# Patient Record
Sex: MALE | Race: WHITE | NOT HISPANIC OR LATINO | Employment: OTHER | URBAN - METROPOLITAN AREA
[De-identification: names, ages, dates, MRNs, and addresses within clinical notes are randomized per-mention and may not be internally consistent; named-entity substitution may affect disease eponyms.]

---

## 2018-03-15 ENCOUNTER — HOSPITAL ENCOUNTER (EMERGENCY)
Facility: HOSPITAL | Age: 78
Discharge: HOME | End: 2018-03-15
Attending: EMERGENCY MEDICINE | Admitting: EMERGENCY MEDICINE

## 2018-03-15 ENCOUNTER — APPOINTMENT (OUTPATIENT)
Dept: GENERAL RADIOLOGY | Facility: HOSPITAL | Age: 78
End: 2018-03-15

## 2018-03-15 VITALS
WEIGHT: 181 LBS | HEIGHT: 69 IN | BODY MASS INDEX: 26.81 KG/M2 | DIASTOLIC BLOOD PRESSURE: 82 MMHG | HEART RATE: 126 BPM | SYSTOLIC BLOOD PRESSURE: 108 MMHG | RESPIRATION RATE: 18 BRPM | OXYGEN SATURATION: 95 % | TEMPERATURE: 97.5 F

## 2018-03-15 DIAGNOSIS — I48.0 PAROXYSMAL A-FIB (HCC): Primary | ICD-10-CM

## 2018-03-15 DIAGNOSIS — R79.1 SUBTHERAPEUTIC INTERNATIONAL NORMALIZED RATIO (INR): ICD-10-CM

## 2018-03-15 LAB
ALBUMIN SERPL-MCNC: 4.4 G/DL (ref 3.2–4.8)
ALBUMIN/GLOB SERPL: 1.5 G/DL (ref 1.5–2.5)
ALP SERPL-CCNC: 66 U/L (ref 25–100)
ALT SERPL W P-5'-P-CCNC: 17 U/L (ref 7–40)
ANION GAP SERPL CALCULATED.3IONS-SCNC: 5 MMOL/L (ref 3–11)
AST SERPL-CCNC: 35 U/L (ref 0–33)
BASOPHILS # BLD AUTO: 0.03 10*3/MM3 (ref 0–0.2)
BASOPHILS NFR BLD AUTO: 0.4 % (ref 0–1)
BILIRUB SERPL-MCNC: 0.5 MG/DL (ref 0.3–1.2)
BUN BLD-MCNC: 10 MG/DL (ref 9–23)
BUN/CREAT SERPL: 11.1 (ref 7–25)
CALCIUM SPEC-SCNC: 9.4 MG/DL (ref 8.7–10.4)
CHLORIDE SERPL-SCNC: 108 MMOL/L (ref 99–109)
CO2 SERPL-SCNC: 26 MMOL/L (ref 20–31)
CREAT BLD-MCNC: 0.9 MG/DL (ref 0.6–1.3)
DEPRECATED RDW RBC AUTO: 43.1 FL (ref 37–54)
EOSINOPHIL # BLD AUTO: 0.05 10*3/MM3 (ref 0–0.3)
EOSINOPHIL NFR BLD AUTO: 0.6 % (ref 0–3)
ERYTHROCYTE [DISTWIDTH] IN BLOOD BY AUTOMATED COUNT: 13.4 % (ref 11.3–14.5)
GFR SERPL CREATININE-BSD FRML MDRD: 82 ML/MIN/1.73
GLOBULIN UR ELPH-MCNC: 3 GM/DL
GLUCOSE BLD-MCNC: 100 MG/DL (ref 70–100)
HCT VFR BLD AUTO: 46 % (ref 38.9–50.9)
HGB BLD-MCNC: 15.7 G/DL (ref 13.1–17.5)
HOLD SPECIMEN: NORMAL
IMM GRANULOCYTES # BLD: 0.05 10*3/MM3 (ref 0–0.03)
IMM GRANULOCYTES NFR BLD: 0.6 % (ref 0–0.6)
INR PPP: 1.82 (ref 0.91–1.09)
LYMPHOCYTES # BLD AUTO: 2.35 10*3/MM3 (ref 0.6–4.8)
LYMPHOCYTES NFR BLD AUTO: 27.5 % (ref 24–44)
MAGNESIUM SERPL-MCNC: 2.4 MG/DL (ref 1.3–2.7)
MCH RBC QN AUTO: 30.1 PG (ref 27–31)
MCHC RBC AUTO-ENTMCNC: 34.1 G/DL (ref 32–36)
MCV RBC AUTO: 88.3 FL (ref 80–99)
MONOCYTES # BLD AUTO: 0.79 10*3/MM3 (ref 0–1)
MONOCYTES NFR BLD AUTO: 9.3 % (ref 0–12)
NEUTROPHILS # BLD AUTO: 5.27 10*3/MM3 (ref 1.5–8.3)
NEUTROPHILS NFR BLD AUTO: 61.6 % (ref 41–71)
PLATELET # BLD AUTO: 197 10*3/MM3 (ref 150–450)
PMV BLD AUTO: 11.4 FL (ref 6–12)
POTASSIUM BLD-SCNC: 5 MMOL/L (ref 3.5–5.5)
PROT SERPL-MCNC: 7.4 G/DL (ref 5.7–8.2)
PROTHROMBIN TIME: 19.1 SECONDS (ref 9.6–11.5)
RBC # BLD AUTO: 5.21 10*6/MM3 (ref 4.2–5.76)
SODIUM BLD-SCNC: 139 MMOL/L (ref 132–146)
TROPONIN I SERPL-MCNC: 0.01 NG/ML (ref 0–0.07)
TROPONIN I SERPL-MCNC: 0.01 NG/ML (ref 0–0.07)
WBC NRBC COR # BLD: 8.54 10*3/MM3 (ref 3.5–10.8)
WHOLE BLOOD HOLD SPECIMEN: NORMAL
WHOLE BLOOD HOLD SPECIMEN: NORMAL

## 2018-03-15 PROCEDURE — 85610 PROTHROMBIN TIME: CPT | Performed by: EMERGENCY MEDICINE

## 2018-03-15 PROCEDURE — 85025 COMPLETE CBC W/AUTO DIFF WBC: CPT | Performed by: EMERGENCY MEDICINE

## 2018-03-15 PROCEDURE — 36415 COLL VENOUS BLD VENIPUNCTURE: CPT

## 2018-03-15 PROCEDURE — 80053 COMPREHEN METABOLIC PANEL: CPT | Performed by: EMERGENCY MEDICINE

## 2018-03-15 PROCEDURE — 96372 THER/PROPH/DIAG INJ SC/IM: CPT

## 2018-03-15 PROCEDURE — 99284 EMERGENCY DEPT VISIT MOD MDM: CPT

## 2018-03-15 PROCEDURE — 71045 X-RAY EXAM CHEST 1 VIEW: CPT

## 2018-03-15 PROCEDURE — 25010000002 ENOXAPARIN PER 10 MG: Performed by: EMERGENCY MEDICINE

## 2018-03-15 PROCEDURE — 83735 ASSAY OF MAGNESIUM: CPT | Performed by: EMERGENCY MEDICINE

## 2018-03-15 PROCEDURE — 84484 ASSAY OF TROPONIN QUANT: CPT

## 2018-03-15 PROCEDURE — 93005 ELECTROCARDIOGRAM TRACING: CPT | Performed by: EMERGENCY MEDICINE

## 2018-03-15 PROCEDURE — 96365 THER/PROPH/DIAG IV INF INIT: CPT

## 2018-03-15 RX ORDER — NITROGLYCERIN 0.4 MG/1
0.4 TABLET SUBLINGUAL
COMMUNITY

## 2018-03-15 RX ORDER — CHLORAL HYDRATE 500 MG
CAPSULE ORAL
COMMUNITY

## 2018-03-15 RX ORDER — ESMOLOL HYDROCHLORIDE 10 MG/ML
50-300 INJECTION, SOLUTION INTRAVENOUS
Status: DISCONTINUED | OUTPATIENT
Start: 2018-03-15 | End: 2018-03-15 | Stop reason: HOSPADM

## 2018-03-15 RX ORDER — DILTIAZEM HYDROCHLORIDE 120 MG/1
120 CAPSULE, COATED, EXTENDED RELEASE ORAL ONCE
Status: COMPLETED | OUTPATIENT
Start: 2018-03-15 | End: 2018-03-15

## 2018-03-15 RX ORDER — WARFARIN SODIUM 5 MG/1
5 TABLET ORAL
Qty: 30 TABLET | Refills: 0 | Status: SHIPPED | OUTPATIENT
Start: 2018-03-15

## 2018-03-15 RX ORDER — TRAMADOL HYDROCHLORIDE 50 MG/1
50 TABLET ORAL 2 TIMES DAILY PRN
COMMUNITY

## 2018-03-15 RX ORDER — LOSARTAN POTASSIUM 25 MG/1
25 TABLET ORAL DAILY
COMMUNITY

## 2018-03-15 RX ORDER — RANITIDINE 150 MG/1
150 TABLET ORAL NIGHTLY
COMMUNITY

## 2018-03-15 RX ORDER — SODIUM CHLORIDE 0.9 % (FLUSH) 0.9 %
10 SYRINGE (ML) INJECTION AS NEEDED
Status: DISCONTINUED | OUTPATIENT
Start: 2018-03-15 | End: 2018-03-15 | Stop reason: HOSPADM

## 2018-03-15 RX ORDER — PRAVASTATIN SODIUM 40 MG
40 TABLET ORAL DAILY
COMMUNITY

## 2018-03-15 RX ORDER — DILTIAZEM HYDROCHLORIDE 120 MG/1
120 CAPSULE, COATED, EXTENDED RELEASE ORAL DAILY
Qty: 30 CAPSULE | Refills: 0 | Status: SHIPPED | OUTPATIENT
Start: 2018-03-15

## 2018-03-15 RX ORDER — FOLIC ACID 1 MG/1
1 TABLET ORAL DAILY
COMMUNITY

## 2018-03-15 RX ORDER — WARFARIN SODIUM 4 MG/1
4 TABLET ORAL
COMMUNITY

## 2018-03-15 RX ORDER — WARFARIN SODIUM 4 MG/1
8 TABLET ORAL
Status: COMPLETED | OUTPATIENT
Start: 2018-03-15 | End: 2018-03-15

## 2018-03-15 RX ADMIN — ENOXAPARIN SODIUM 80 MG: 80 INJECTION SUBCUTANEOUS at 17:54

## 2018-03-15 RX ADMIN — DILTIAZEM HYDROCHLORIDE 120 MG: 120 CAPSULE, COATED, EXTENDED RELEASE ORAL at 19:51

## 2018-03-15 RX ADMIN — WARFARIN SODIUM 8 MG: 4 TABLET ORAL at 19:52

## 2018-03-15 RX ADMIN — ESMOLOL HYDROCHLORIDE 50 MCG/KG/MIN: 10 INJECTION INTRAVENOUS at 17:54

## 2018-03-15 NOTE — DISCHARGE INSTRUCTIONS
Follow up with Dr. Adan tomorrow in office. Call in the morning to confirm your appointment.     Do not take your warfarin tonight as we have given you a dose this evening.    Diltiazem 120  CD given here this evening.  Ask Dr. Adan about further diltiazem or other drug to control your atrial fibrillation rate.    Increase your warfarin to 5 mg nightly starting tomorrow evening, then get your PT/INR rechecked Monday AM, 3/19.    Immediately return to the emergency department for any new or worsening symptoms.

## 2018-03-15 NOTE — ED PROVIDER NOTES
Subjective   Mr. Phuc Bowden is a 77 y.o. male who presents to the ED with an abnormal EKG. He was at this PCP's office for low back pain following a worker's injury. While at the office, an EKG was performed and was told he was in afib and needed to come to the ER for evaluation. 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 Subsequent EKGs did not show afib. He denies any chest pain, or palpitations. He only notes of some SoA for the past 3-4 days. Otherwise he feels very well and has no abdominal pain, N/V/D, URI sx, urinary or stool sx, or any other acute sx at this time. He is anticoaguated on Warfarin.         History provided by:  Patient  Illness   Location:  Abnormal EKG  Quality:  Afib  Severity:  Unable to specify  Onset quality:  Unable to specify  Timing:  Unable to specify  Progression:  Unable to specify  Chronicity:  New  Context:  At PCP's office for back pain and found to be in afib  Associated symptoms: shortness of breath    Associated symptoms: no abdominal pain, no chest pain, no congestion, no cough, no diarrhea, no nausea, no rhinorrhea, no sore throat and no vomiting    Risk factors:  CABG x3      Review of Systems   HENT: Negative for congestion, rhinorrhea and sore throat.    Respiratory: Positive for shortness of breath. Negative for cough.    Cardiovascular: Negative for chest pain and palpitations.   Gastrointestinal: Negative for abdominal pain, diarrhea, nausea and vomiting.   Genitourinary: Negative for difficulty urinating and dysuria.   All other systems reviewed and are negative.      Past Medical History:   Diagnosis Date   • Anemia    • Arthritis    • Atrial fibrillation    • Bowel obstruction    • Bradycardia    • CHF (congestive heart failure)    • COPD (chronic obstructive pulmonary disease)    • Coronary artery disease    • GERD (gastroesophageal reflux disease)    • Hearing loss    • Hyperlipidemia    • Injury of back    • Myocardial infarction    • Sciatica         Allergies   Allergen Reactions   • Bee Venom Anaphylaxis       Past Surgical History:   Procedure Laterality Date   • CARDIAC SURGERY      2006   • CORONARY ARTERY BYPASS GRAFT     • EYE SURGERY     • HEMORRHOIDECTOMY         History reviewed. No pertinent family history.    Social History     Social History   • Marital status:      Social History Main Topics   • Drug use: Unknown     Other Topics Concern   • Not on file         Objective   Physical Exam   Constitutional: He is oriented to person, place, and time. He appears well-developed and well-nourished. No distress.   HENT:   Head: Normocephalic and atraumatic.   Nose: Nose normal.   Eyes: Conjunctivae are normal. No scleral icterus.   Neck: Normal range of motion. Neck supple.   Cardiovascular: Normal heart sounds and intact distal pulses.  An irregular rhythm present. Tachycardia present.    No murmur heard.  Pulmonary/Chest: Effort normal and breath sounds normal. No respiratory distress.   Abdominal: Soft. Bowel sounds are normal. There is no tenderness.   Musculoskeletal: Normal range of motion. He exhibits no edema.   Neurological: He is alert and oriented to person, place, and time.   Skin: Skin is warm and dry. He is not diaphoretic.   Psychiatric: He has a normal mood and affect. His behavior is normal.   Nursing note and vitals reviewed.      Procedures         ED Course  ED Course   Comment By Time   2/9/2018 INR is 1.5 Aum Velásquez 03/15 1605   Long discussion with family after talking about option of admission with hospitalist.  Dr. Manuel felt that since he is tolerating his atrial fib well, then admission not needed.  He certainly needs better anticoagulation and rate control.  When on 50 metoprolol bid and sinus rhythm he apparently was bradycardic, thus on 25 bid.  Will try a fib rate control with po diltiazem.  Increase warfarin to 5 mg nightly with quick INR recheck 3/19.  Kentrell Lee MD 03/15 2840   Second ECG here showed a  wide QRS tachycardia that was regular without obvious P wave, suggesting atrial flutter with 2:1 block.  Still quite stable with outpatient workup acceptable. Kentrell Lee MD 03/15 1904     Recent Results (from the past 24 hour(s))   Comprehensive Metabolic Panel    Collection Time: 03/15/18  3:29 PM   Result Value Ref Range    Glucose 100 70 - 100 mg/dL    BUN 10 9 - 23 mg/dL    Creatinine 0.90 0.60 - 1.30 mg/dL    Sodium 139 132 - 146 mmol/L    Potassium 5.0 3.5 - 5.5 mmol/L    Chloride 108 99 - 109 mmol/L    CO2 26.0 20.0 - 31.0 mmol/L    Calcium 9.4 8.7 - 10.4 mg/dL    Total Protein 7.4 5.7 - 8.2 g/dL    Albumin 4.40 3.20 - 4.80 g/dL    ALT (SGPT) 17 7 - 40 U/L    AST (SGOT) 35 (H) 0 - 33 U/L    Alkaline Phosphatase 66 25 - 100 U/L    Total Bilirubin 0.5 0.3 - 1.2 mg/dL    eGFR Non African Amer 82 >60 mL/min/1.73    Globulin 3.0 gm/dL    A/G Ratio 1.5 1.5 - 2.5 g/dL    BUN/Creatinine Ratio 11.1 7.0 - 25.0    Anion Gap 5.0 3.0 - 11.0 mmol/L   Magnesium    Collection Time: 03/15/18  3:29 PM   Result Value Ref Range    Magnesium 2.4 1.3 - 2.7 mg/dL   Light Blue Top    Collection Time: 03/15/18  3:29 PM   Result Value Ref Range    Extra Tube hold for add-on    Green Top (Gel)    Collection Time: 03/15/18  3:29 PM   Result Value Ref Range    Extra Tube Hold for add-ons.    Protime-INR    Collection Time: 03/15/18  3:29 PM   Result Value Ref Range    Protime 19.1 (H) 9.6 - 11.5 Seconds    INR 1.82 (H) 0.91 - 1.09   POC Troponin, Rapid    Collection Time: 03/15/18  3:30 PM   Result Value Ref Range    Troponin I 0.01 0.00 - 0.07 ng/mL   Lavender Top    Collection Time: 03/15/18  3:42 PM   Result Value Ref Range    Extra Tube hold for add-on    Gold Top - SST    Collection Time: 03/15/18  3:42 PM   Result Value Ref Range    Extra Tube Hold for add-ons.    Green Top (No Gel)    Collection Time: 03/15/18  3:42 PM   Result Value Ref Range    Extra Tube Hold for add-ons.    CBC Auto Differential    Collection Time:  "03/15/18  3:42 PM   Result Value Ref Range    WBC 8.54 3.50 - 10.80 10*3/mm3    RBC 5.21 4.20 - 5.76 10*6/mm3    Hemoglobin 15.7 13.1 - 17.5 g/dL    Hematocrit 46.0 38.9 - 50.9 %    MCV 88.3 80.0 - 99.0 fL    MCH 30.1 27.0 - 31.0 pg    MCHC 34.1 32.0 - 36.0 g/dL    RDW 13.4 11.3 - 14.5 %    RDW-SD 43.1 37.0 - 54.0 fl    MPV 11.4 6.0 - 12.0 fL    Platelets 197 150 - 450 10*3/mm3    Neutrophil % 61.6 41.0 - 71.0 %    Lymphocyte % 27.5 24.0 - 44.0 %    Monocyte % 9.3 0.0 - 12.0 %    Eosinophil % 0.6 0.0 - 3.0 %    Basophil % 0.4 0.0 - 1.0 %    Immature Grans % 0.6 0.0 - 0.6 %    Neutrophils, Absolute 5.27 1.50 - 8.30 10*3/mm3    Lymphocytes, Absolute 2.35 0.60 - 4.80 10*3/mm3    Monocytes, Absolute 0.79 0.00 - 1.00 10*3/mm3    Eosinophils, Absolute 0.05 0.00 - 0.30 10*3/mm3    Basophils, Absolute 0.03 0.00 - 0.20 10*3/mm3    Immature Grans, Absolute 0.05 (H) 0.00 - 0.03 10*3/mm3   POC Troponin, Rapid    Collection Time: 03/15/18  5:26 PM   Result Value Ref Range    Troponin I 0.01 0.00 - 0.07 ng/mL     Note: In addition to lab results from this visit, the labs listed above may include labs taken at another facility or during a different encounter within the last 24 hours. Please correlate lab times with ED admission and discharge times for further clarification of the services performed during this visit.    XR Chest 1 View   Preliminary Result   Stable chronic-appearing interstitial lung changes from   2006. No new chest disease is seen.       DICTATED:     03/15/2018   EDITED/ls :     03/15/2018             Vitals:    03/15/18 1506 03/15/18 1618 03/15/18 1728   BP: 125/75 110/87    BP Location: Left arm     Patient Position: Sitting     Pulse: (!) 129 (!) 129 (!) 128   Resp: 18     Temp: 97.5 °F (36.4 °C)     TempSrc: Oral     SpO2: 97% 96% 94%   Weight: 82.1 kg (181 lb)     Height: 175.3 cm (69\")       Medications   sodium chloride 0.9 % flush 10 mL (not administered)   esmolol (BREVIBLOC) 2500 mg/250 mL (10 mg/mL) " infusion (50 mcg/kg/min × 82.1 kg Intravenous New Bag 3/15/18 1754)   diltiaZEM CD (CARDIZEM CD) 24 hr capsule 120 mg (not administered)   enoxaparin (LOVENOX) syringe 80 mg (80 mg Subcutaneous Given 3/15/18 1754)     ECG/EMG Results (last 24 hours)     Procedure Component Value Units Date/Time    ECG 12 Lead [[id number]] Collected:  03/15/18 1517     Updated:  03/15/18 1517        ECG 12 Lead   Final Result   Test Reason : IRREG HR   Blood Pressure : **/** mmHG   Vent. Rate : 117 BPM     Atrial Rate : 119 BPM      P-R Int : 000 ms          QRS Dur : 174 ms       QT Int : 380 ms       P-R-T Axes : 000 -64 080 degrees      QTc Int : 530 ms      Atrial fibrillation with rapid ventricular response   Right bundle branch block   Left anterior fascicular block   ** Bifascicular block **   Abnormal ECG   No previous ECGs available   Confirmed by KENTRELL LEE MD ([id number]) on 3/15/2018 3:35:30 PM      Referred By: md            Confirmed By:KENTRELL LEE MD      ECG 12 Lead    (Results Pending)                   MDM    Final diagnoses:   Paroxysmal a-fib   Subtherapeutic international normalized ratio (INR)       Documentation assistance provided by scraryan DOMINGUEZ.  Information recorded by the scribe was done at my direction and has been verified and validated by me.     Boris Dominguez  03/15/18 0653       Kentrell Lee MD  03/15/18 6953

## 2023-07-18 ENCOUNTER — HOSPITAL ENCOUNTER (INPATIENT)
Facility: HOSPITAL | Age: 83
LOS: 1 days | Discharge: HOSPICE | DRG: 542 | End: 2023-07-21
Attending: EMERGENCY MEDICINE | Admitting: INTERNAL MEDICINE
Payer: MEDICARE

## 2023-07-18 ENCOUNTER — APPOINTMENT (OUTPATIENT)
Dept: MRI IMAGING | Facility: HOSPITAL | Age: 83
DRG: 542 | End: 2023-07-18
Payer: MEDICARE

## 2023-07-18 ENCOUNTER — APPOINTMENT (OUTPATIENT)
Dept: CT IMAGING | Facility: HOSPITAL | Age: 83
DRG: 542 | End: 2023-07-18
Payer: MEDICARE

## 2023-07-18 DIAGNOSIS — C80.1 MALIGNANT NEOPLASM METASTATIC TO LUMBAR SPINE WITH UNKNOWN PRIMARY SITE: ICD-10-CM

## 2023-07-18 DIAGNOSIS — M54.50 ACUTE MIDLINE LOW BACK PAIN WITHOUT SCIATICA: ICD-10-CM

## 2023-07-18 DIAGNOSIS — R79.89 ELEVATED BRAIN NATRIURETIC PEPTIDE (BNP) LEVEL: ICD-10-CM

## 2023-07-18 DIAGNOSIS — I50.9 CONGESTIVE HEART FAILURE, UNSPECIFIED HF CHRONICITY, UNSPECIFIED HEART FAILURE TYPE: ICD-10-CM

## 2023-07-18 DIAGNOSIS — R17 ELEVATED BILIRUBIN: ICD-10-CM

## 2023-07-18 DIAGNOSIS — R79.1 ELEVATED INR: ICD-10-CM

## 2023-07-18 DIAGNOSIS — Z78.9 IMPAIRED MOBILITY AND ADLS: ICD-10-CM

## 2023-07-18 DIAGNOSIS — C78.7 CANCER, METASTATIC TO LIVER: ICD-10-CM

## 2023-07-18 DIAGNOSIS — R91.8 RIGHT LOWER LOBE LUNG MASS: ICD-10-CM

## 2023-07-18 DIAGNOSIS — G89.3 CANCER ASSOCIATED PAIN: ICD-10-CM

## 2023-07-18 DIAGNOSIS — R53.1 GENERALIZED WEAKNESS: Primary | ICD-10-CM

## 2023-07-18 DIAGNOSIS — R79.89 ELEVATED LACTIC ACID LEVEL: ICD-10-CM

## 2023-07-18 DIAGNOSIS — R10.84 GENERALIZED ABDOMINAL PAIN: ICD-10-CM

## 2023-07-18 DIAGNOSIS — J96.01 ACUTE RESPIRATORY FAILURE WITH HYPOXIA: ICD-10-CM

## 2023-07-18 DIAGNOSIS — Z74.09 IMPAIRED MOBILITY AND ADLS: ICD-10-CM

## 2023-07-18 DIAGNOSIS — J21.9 BRONCHIOLITIS: ICD-10-CM

## 2023-07-18 DIAGNOSIS — C79.51 MALIGNANT NEOPLASM METASTATIC TO LUMBAR SPINE WITH UNKNOWN PRIMARY SITE: ICD-10-CM

## 2023-07-18 PROBLEM — I10 ESSENTIAL HYPERTENSION: Status: ACTIVE | Noted: 2023-07-18

## 2023-07-18 PROBLEM — D72.829 LEUKOCYTOSIS: Status: ACTIVE | Noted: 2023-07-18

## 2023-07-18 PROBLEM — C79.9 METASTASIS: Status: ACTIVE | Noted: 2023-07-18

## 2023-07-18 PROBLEM — Z53.20 FUNCTIONAL ASSESSMENT DECLINED: Status: ACTIVE | Noted: 2023-07-18

## 2023-07-18 PROBLEM — I25.10 CAD (CORONARY ARTERY DISEASE): Status: ACTIVE | Noted: 2023-07-18

## 2023-07-18 PROBLEM — E78.5 HYPERLIPIDEMIA: Status: ACTIVE | Noted: 2023-07-18

## 2023-07-18 PROBLEM — J18.9 PNEUMONIA OF RIGHT LUNG DUE TO INFECTIOUS ORGANISM: Status: ACTIVE | Noted: 2023-07-18

## 2023-07-18 PROBLEM — J44.9 COPD (CHRONIC OBSTRUCTIVE PULMONARY DISEASE): Status: ACTIVE | Noted: 2023-07-18

## 2023-07-18 PROBLEM — E87.20 LACTIC ACIDOSIS: Status: ACTIVE | Noted: 2023-07-18

## 2023-07-18 PROBLEM — I48.0 PAF (PAROXYSMAL ATRIAL FIBRILLATION): Status: ACTIVE | Noted: 2023-07-18

## 2023-07-18 PROBLEM — I25.2 CORONARY ARTERY DISEASE WITH HISTORY OF MYOCARDIAL INFARCTION WITHOUT HISTORY OF CABG: Status: ACTIVE | Noted: 2023-07-18

## 2023-07-18 LAB
ALBUMIN SERPL-MCNC: 3.6 G/DL (ref 3.5–5.2)
ALBUMIN/GLOB SERPL: 0.9 G/DL
ALP SERPL-CCNC: 270 U/L (ref 39–117)
ALT SERPL W P-5'-P-CCNC: 16 U/L (ref 1–41)
ANION GAP SERPL CALCULATED.3IONS-SCNC: 17 MMOL/L (ref 5–15)
AST SERPL-CCNC: 28 U/L (ref 1–40)
B PARAPERT DNA SPEC QL NAA+PROBE: NOT DETECTED
B PERT DNA SPEC QL NAA+PROBE: NOT DETECTED
BACTERIA UR QL AUTO: ABNORMAL /HPF
BASOPHILS # BLD AUTO: 0.05 10*3/MM3 (ref 0–0.2)
BASOPHILS NFR BLD AUTO: 0.3 % (ref 0–1.5)
BILIRUB SERPL-MCNC: 2 MG/DL (ref 0–1.2)
BILIRUB UR QL STRIP: NEGATIVE
BUN SERPL-MCNC: 17 MG/DL (ref 8–23)
BUN/CREAT SERPL: 17.3 (ref 7–25)
C PNEUM DNA NPH QL NAA+NON-PROBE: NOT DETECTED
CALCIUM SPEC-SCNC: 10.3 MG/DL (ref 8.6–10.5)
CHLORIDE SERPL-SCNC: 97 MMOL/L (ref 98–107)
CK SERPL-CCNC: 94 U/L (ref 20–200)
CLARITY UR: CLEAR
CO2 SERPL-SCNC: 22 MMOL/L (ref 22–29)
COLOR UR: YELLOW
CREAT BLDA-MCNC: 0.9 MG/DL (ref 0.6–1.3)
CREAT SERPL-MCNC: 0.98 MG/DL (ref 0.76–1.27)
D-LACTATE SERPL-SCNC: 1.4 MMOL/L (ref 0.5–2)
D-LACTATE SERPL-SCNC: 2.6 MMOL/L (ref 0.5–2)
DEPRECATED RDW RBC AUTO: 45.5 FL (ref 37–54)
EGFRCR SERPLBLD CKD-EPI 2021: 77.5 ML/MIN/1.73
EOSINOPHIL # BLD AUTO: 0.01 10*3/MM3 (ref 0–0.4)
EOSINOPHIL NFR BLD AUTO: 0.1 % (ref 0.3–6.2)
ERYTHROCYTE [DISTWIDTH] IN BLOOD BY AUTOMATED COUNT: 14 % (ref 12.3–15.4)
FLUAV SUBTYP SPEC NAA+PROBE: NOT DETECTED
FLUBV RNA ISLT QL NAA+PROBE: NOT DETECTED
GLOBULIN UR ELPH-MCNC: 4.1 GM/DL
GLUCOSE SERPL-MCNC: 115 MG/DL (ref 65–99)
GLUCOSE UR STRIP-MCNC: NEGATIVE MG/DL
HADV DNA SPEC NAA+PROBE: NOT DETECTED
HCOV 229E RNA SPEC QL NAA+PROBE: NOT DETECTED
HCOV HKU1 RNA SPEC QL NAA+PROBE: NOT DETECTED
HCOV NL63 RNA SPEC QL NAA+PROBE: NOT DETECTED
HCOV OC43 RNA SPEC QL NAA+PROBE: NOT DETECTED
HCT VFR BLD AUTO: 42.1 % (ref 37.5–51)
HGB BLD-MCNC: 13.7 G/DL (ref 13–17.7)
HGB UR QL STRIP.AUTO: ABNORMAL
HMPV RNA NPH QL NAA+NON-PROBE: NOT DETECTED
HOLD SPECIMEN: NORMAL
HPIV1 RNA ISLT QL NAA+PROBE: NOT DETECTED
HPIV2 RNA SPEC QL NAA+PROBE: NOT DETECTED
HPIV3 RNA NPH QL NAA+PROBE: NOT DETECTED
HPIV4 P GENE NPH QL NAA+PROBE: NOT DETECTED
HYALINE CASTS UR QL AUTO: ABNORMAL /LPF
IMM GRANULOCYTES # BLD AUTO: 0.42 10*3/MM3 (ref 0–0.05)
IMM GRANULOCYTES NFR BLD AUTO: 2.5 % (ref 0–0.5)
INR PPP: 5.91 (ref 0.89–1.12)
KETONES UR QL STRIP: NEGATIVE
LEUKOCYTE ESTERASE UR QL STRIP.AUTO: NEGATIVE
LYMPHOCYTES # BLD AUTO: 0.87 10*3/MM3 (ref 0.7–3.1)
LYMPHOCYTES NFR BLD AUTO: 5.3 % (ref 19.6–45.3)
M PNEUMO IGG SER IA-ACNC: NOT DETECTED
MCH RBC QN AUTO: 29.3 PG (ref 26.6–33)
MCHC RBC AUTO-ENTMCNC: 32.5 G/DL (ref 31.5–35.7)
MCV RBC AUTO: 90 FL (ref 79–97)
MONOCYTES # BLD AUTO: 1.21 10*3/MM3 (ref 0.1–0.9)
MONOCYTES NFR BLD AUTO: 7.3 % (ref 5–12)
NEUTROPHILS NFR BLD AUTO: 13.99 10*3/MM3 (ref 1.7–7)
NEUTROPHILS NFR BLD AUTO: 84.5 % (ref 42.7–76)
NITRITE UR QL STRIP: NEGATIVE
NRBC BLD AUTO-RTO: 0 /100 WBC (ref 0–0.2)
NT-PROBNP SERPL-MCNC: ABNORMAL PG/ML (ref 0–1800)
PH UR STRIP.AUTO: 5.5 [PH] (ref 5–8)
PLATELET # BLD AUTO: 284 10*3/MM3 (ref 140–450)
PMV BLD AUTO: 9.3 FL (ref 6–12)
POTASSIUM SERPL-SCNC: 4.1 MMOL/L (ref 3.5–5.2)
PROCALCITONIN SERPL-MCNC: 0.27 NG/ML (ref 0–0.25)
PROT SERPL-MCNC: 7.7 G/DL (ref 6–8.5)
PROT UR QL STRIP: ABNORMAL
PROTHROMBIN TIME: 53.3 SECONDS (ref 12.2–14.5)
RBC # BLD AUTO: 4.68 10*6/MM3 (ref 4.14–5.8)
RBC # UR STRIP: ABNORMAL /HPF
REF LAB TEST METHOD: ABNORMAL
RHINOVIRUS RNA SPEC NAA+PROBE: NOT DETECTED
RSV RNA NPH QL NAA+NON-PROBE: NOT DETECTED
SARS-COV-2 RNA NPH QL NAA+NON-PROBE: NOT DETECTED
SODIUM SERPL-SCNC: 136 MMOL/L (ref 136–145)
SP GR UR STRIP: 1.07 (ref 1–1.03)
SQUAMOUS #/AREA URNS HPF: ABNORMAL /HPF
TROPONIN T SERPL HS-MCNC: 21 NG/L
UROBILINOGEN UR QL STRIP: ABNORMAL
WBC # UR STRIP: ABNORMAL /HPF
WBC NRBC COR # BLD: 16.55 10*3/MM3 (ref 3.4–10.8)
WHOLE BLOOD HOLD COAG: NORMAL
WHOLE BLOOD HOLD SPECIMEN: NORMAL

## 2023-07-18 PROCEDURE — 80053 COMPREHEN METABOLIC PANEL: CPT | Performed by: EMERGENCY MEDICINE

## 2023-07-18 PROCEDURE — 71275 CT ANGIOGRAPHY CHEST: CPT

## 2023-07-18 PROCEDURE — 99205 OFFICE O/P NEW HI 60 MIN: CPT | Performed by: INTERNAL MEDICINE

## 2023-07-18 PROCEDURE — 85610 PROTHROMBIN TIME: CPT | Performed by: EMERGENCY MEDICINE

## 2023-07-18 PROCEDURE — 25010000002 PIPERACILLIN SOD-TAZOBACTAM PER 1 G: Performed by: EMERGENCY MEDICINE

## 2023-07-18 PROCEDURE — A9577 INJ MULTIHANCE: HCPCS | Performed by: EMERGENCY MEDICINE

## 2023-07-18 PROCEDURE — 81001 URINALYSIS AUTO W/SCOPE: CPT | Performed by: STUDENT IN AN ORGANIZED HEALTH CARE EDUCATION/TRAINING PROGRAM

## 2023-07-18 PROCEDURE — G0378 HOSPITAL OBSERVATION PER HR: HCPCS

## 2023-07-18 PROCEDURE — 25510000001 IOPAMIDOL PER 1 ML: Performed by: EMERGENCY MEDICINE

## 2023-07-18 PROCEDURE — 84484 ASSAY OF TROPONIN QUANT: CPT | Performed by: EMERGENCY MEDICINE

## 2023-07-18 PROCEDURE — 84145 PROCALCITONIN (PCT): CPT | Performed by: EMERGENCY MEDICINE

## 2023-07-18 PROCEDURE — 0 GADOBENATE DIMEGLUMINE 529 MG/ML SOLUTION: Performed by: EMERGENCY MEDICINE

## 2023-07-18 PROCEDURE — 99285 EMERGENCY DEPT VISIT HI MDM: CPT

## 2023-07-18 PROCEDURE — 25010000002 VANCOMYCIN 10 G RECONSTITUTED SOLUTION: Performed by: EMERGENCY MEDICINE

## 2023-07-18 PROCEDURE — 74177 CT ABD & PELVIS W/CONTRAST: CPT

## 2023-07-18 PROCEDURE — 25010000002 MORPHINE PER 10 MG: Performed by: STUDENT IN AN ORGANIZED HEALTH CARE EDUCATION/TRAINING PROGRAM

## 2023-07-18 PROCEDURE — 83605 ASSAY OF LACTIC ACID: CPT | Performed by: EMERGENCY MEDICINE

## 2023-07-18 PROCEDURE — 82550 ASSAY OF CK (CPK): CPT | Performed by: EMERGENCY MEDICINE

## 2023-07-18 PROCEDURE — 99223 1ST HOSP IP/OBS HIGH 75: CPT | Performed by: NURSE PRACTITIONER

## 2023-07-18 PROCEDURE — 87449 NOS EACH ORGANISM AG IA: CPT | Performed by: STUDENT IN AN ORGANIZED HEALTH CARE EDUCATION/TRAINING PROGRAM

## 2023-07-18 PROCEDURE — 36415 COLL VENOUS BLD VENIPUNCTURE: CPT

## 2023-07-18 PROCEDURE — 0202U NFCT DS 22 TRGT SARS-COV-2: CPT | Performed by: STUDENT IN AN ORGANIZED HEALTH CARE EDUCATION/TRAINING PROGRAM

## 2023-07-18 PROCEDURE — 25010000002 PIPERACILLIN SOD-TAZOBACTAM PER 1 G: Performed by: NURSE PRACTITIONER

## 2023-07-18 PROCEDURE — 93005 ELECTROCARDIOGRAM TRACING: CPT | Performed by: EMERGENCY MEDICINE

## 2023-07-18 PROCEDURE — 87040 BLOOD CULTURE FOR BACTERIA: CPT | Performed by: EMERGENCY MEDICINE

## 2023-07-18 PROCEDURE — 85025 COMPLETE CBC W/AUTO DIFF WBC: CPT | Performed by: EMERGENCY MEDICINE

## 2023-07-18 PROCEDURE — 72158 MRI LUMBAR SPINE W/O & W/DYE: CPT

## 2023-07-18 PROCEDURE — 82565 ASSAY OF CREATININE: CPT

## 2023-07-18 PROCEDURE — 0 PHYTONADIONE 10 MG/ML SOLUTION: Performed by: STUDENT IN AN ORGANIZED HEALTH CARE EDUCATION/TRAINING PROGRAM

## 2023-07-18 PROCEDURE — 83880 ASSAY OF NATRIURETIC PEPTIDE: CPT | Performed by: EMERGENCY MEDICINE

## 2023-07-18 RX ORDER — SODIUM CHLORIDE 0.9 % (FLUSH) 0.9 %
10 SYRINGE (ML) INJECTION EVERY 12 HOURS SCHEDULED
Status: DISCONTINUED | OUTPATIENT
Start: 2023-07-18 | End: 2023-07-21 | Stop reason: HOSPADM

## 2023-07-18 RX ORDER — MORPHINE SULFATE 2 MG/ML
1 INJECTION, SOLUTION INTRAMUSCULAR; INTRAVENOUS EVERY 4 HOURS PRN
Status: DISCONTINUED | OUTPATIENT
Start: 2023-07-18 | End: 2023-07-18

## 2023-07-18 RX ORDER — MORPHINE SULFATE 2 MG/ML
2 INJECTION, SOLUTION INTRAMUSCULAR; INTRAVENOUS
Status: DISCONTINUED | OUTPATIENT
Start: 2023-07-18 | End: 2023-07-18 | Stop reason: SDUPTHER

## 2023-07-18 RX ORDER — FINASTERIDE 5 MG/1
5 TABLET, FILM COATED ORAL NIGHTLY
COMMUNITY
End: 2023-07-21 | Stop reason: HOSPADM

## 2023-07-18 RX ORDER — MONTELUKAST SODIUM 10 MG/1
10 TABLET ORAL 2 TIMES DAILY
COMMUNITY
End: 2023-07-21 | Stop reason: HOSPADM

## 2023-07-18 RX ORDER — MORPHINE SULFATE 2 MG/ML
1 INJECTION, SOLUTION INTRAMUSCULAR; INTRAVENOUS EVERY 4 HOURS PRN
Status: DISCONTINUED | OUTPATIENT
Start: 2023-07-18 | End: 2023-07-19

## 2023-07-18 RX ORDER — SODIUM CHLORIDE 0.9 % (FLUSH) 0.9 %
10 SYRINGE (ML) INJECTION AS NEEDED
Status: DISCONTINUED | OUTPATIENT
Start: 2023-07-18 | End: 2023-07-21 | Stop reason: HOSPADM

## 2023-07-18 RX ORDER — PHYTONADIONE 2 MG/ML
5 INJECTION, EMULSION INTRAMUSCULAR; INTRAVENOUS; SUBCUTANEOUS ONCE
Status: COMPLETED | OUTPATIENT
Start: 2023-07-18 | End: 2023-07-18

## 2023-07-18 RX ORDER — PRAVASTATIN SODIUM 40 MG
40 TABLET ORAL NIGHTLY
Status: DISCONTINUED | OUTPATIENT
Start: 2023-07-18 | End: 2023-07-21 | Stop reason: HOSPADM

## 2023-07-18 RX ORDER — HYDROCODONE BITARTRATE AND ACETAMINOPHEN 10; 325 MG/1; MG/1
1 TABLET ORAL EVERY 6 HOURS PRN
COMMUNITY
End: 2023-07-21 | Stop reason: HOSPADM

## 2023-07-18 RX ORDER — PHYTONADIONE 2 MG/ML
5 INJECTION, EMULSION INTRAMUSCULAR; INTRAVENOUS; SUBCUTANEOUS ONCE
Status: DISCONTINUED | OUTPATIENT
Start: 2023-07-18 | End: 2023-07-18

## 2023-07-18 RX ORDER — ONDANSETRON 2 MG/ML
4 INJECTION INTRAMUSCULAR; INTRAVENOUS EVERY 6 HOURS PRN
Status: DISCONTINUED | OUTPATIENT
Start: 2023-07-18 | End: 2023-07-21 | Stop reason: HOSPADM

## 2023-07-18 RX ORDER — ONDANSETRON 2 MG/ML
4 INJECTION INTRAMUSCULAR; INTRAVENOUS ONCE AS NEEDED
Status: DISCONTINUED | OUTPATIENT
Start: 2023-07-18 | End: 2023-07-18 | Stop reason: SDUPTHER

## 2023-07-18 RX ORDER — BISACODYL 10 MG
10 SUPPOSITORY, RECTAL RECTAL DAILY PRN
Status: DISCONTINUED | OUTPATIENT
Start: 2023-07-18 | End: 2023-07-21 | Stop reason: HOSPADM

## 2023-07-18 RX ORDER — FOLIC ACID 1 MG/1
1 TABLET ORAL DAILY
Status: DISCONTINUED | OUTPATIENT
Start: 2023-07-19 | End: 2023-07-21 | Stop reason: HOSPADM

## 2023-07-18 RX ORDER — ACETAMINOPHEN 325 MG/1
650 TABLET ORAL EVERY 4 HOURS PRN
Status: DISCONTINUED | OUTPATIENT
Start: 2023-07-18 | End: 2023-07-21 | Stop reason: HOSPADM

## 2023-07-18 RX ORDER — VANCOMYCIN/0.9 % SOD CHLORIDE 1.5G/250ML
20 PLASTIC BAG, INJECTION (ML) INTRAVENOUS ONCE
Status: COMPLETED | OUTPATIENT
Start: 2023-07-18 | End: 2023-07-18

## 2023-07-18 RX ORDER — OXYCODONE AND ACETAMINOPHEN 7.5; 325 MG/1; MG/1
1 TABLET ORAL EVERY 4 HOURS PRN
Status: DISCONTINUED | OUTPATIENT
Start: 2023-07-18 | End: 2023-07-21 | Stop reason: HOSPADM

## 2023-07-18 RX ORDER — AMOXICILLIN 250 MG
2 CAPSULE ORAL 2 TIMES DAILY
Status: DISCONTINUED | OUTPATIENT
Start: 2023-07-18 | End: 2023-07-21 | Stop reason: HOSPADM

## 2023-07-18 RX ORDER — L.ACID,PARA/B.BIFIDUM/S.THERM 8B CELL
1 CAPSULE ORAL DAILY
Status: DISCONTINUED | OUTPATIENT
Start: 2023-07-19 | End: 2023-07-21 | Stop reason: HOSPADM

## 2023-07-18 RX ORDER — SODIUM CHLORIDE 9 MG/ML
40 INJECTION, SOLUTION INTRAVENOUS AS NEEDED
Status: DISCONTINUED | OUTPATIENT
Start: 2023-07-18 | End: 2023-07-21 | Stop reason: HOSPADM

## 2023-07-18 RX ORDER — NALOXONE HCL 0.4 MG/ML
0.4 VIAL (ML) INJECTION
Status: DISCONTINUED | OUTPATIENT
Start: 2023-07-18 | End: 2023-07-18

## 2023-07-18 RX ORDER — FAMOTIDINE 20 MG/1
20 TABLET, FILM COATED ORAL
Status: DISCONTINUED | OUTPATIENT
Start: 2023-07-19 | End: 2023-07-21 | Stop reason: HOSPADM

## 2023-07-18 RX ORDER — DIGOXIN 125 MCG
125 TABLET ORAL EVERY OTHER DAY
COMMUNITY
End: 2023-07-21 | Stop reason: HOSPADM

## 2023-07-18 RX ORDER — NALOXONE HCL 0.4 MG/ML
0.4 VIAL (ML) INJECTION
Status: DISCONTINUED | OUTPATIENT
Start: 2023-07-18 | End: 2023-07-19

## 2023-07-18 RX ORDER — POLYETHYLENE GLYCOL 3350 17 G/17G
17 POWDER, FOR SOLUTION ORAL DAILY PRN
Status: DISCONTINUED | OUTPATIENT
Start: 2023-07-18 | End: 2023-07-21 | Stop reason: HOSPADM

## 2023-07-18 RX ORDER — TAMSULOSIN HYDROCHLORIDE 0.4 MG/1
1 CAPSULE ORAL NIGHTLY
COMMUNITY
End: 2023-07-21 | Stop reason: HOSPADM

## 2023-07-18 RX ORDER — DILTIAZEM HYDROCHLORIDE 120 MG/1
120 CAPSULE, COATED, EXTENDED RELEASE ORAL DAILY
Status: DISCONTINUED | OUTPATIENT
Start: 2023-07-19 | End: 2023-07-21 | Stop reason: HOSPADM

## 2023-07-18 RX ORDER — ALUMINA, MAGNESIA, AND SIMETHICONE 2400; 2400; 240 MG/30ML; MG/30ML; MG/30ML
15 SUSPENSION ORAL EVERY 6 HOURS PRN
Status: DISCONTINUED | OUTPATIENT
Start: 2023-07-18 | End: 2023-07-21 | Stop reason: HOSPADM

## 2023-07-18 RX ORDER — LOSARTAN POTASSIUM 25 MG/1
25 TABLET ORAL DAILY
Status: DISCONTINUED | OUTPATIENT
Start: 2023-07-19 | End: 2023-07-21 | Stop reason: HOSPADM

## 2023-07-18 RX ORDER — BISACODYL 5 MG/1
5 TABLET, DELAYED RELEASE ORAL DAILY PRN
Status: DISCONTINUED | OUTPATIENT
Start: 2023-07-18 | End: 2023-07-21 | Stop reason: HOSPADM

## 2023-07-18 RX ORDER — CHOLECALCIFEROL (VITAMIN D3) 125 MCG
5 CAPSULE ORAL NIGHTLY PRN
Status: DISCONTINUED | OUTPATIENT
Start: 2023-07-18 | End: 2023-07-21 | Stop reason: HOSPADM

## 2023-07-18 RX ADMIN — PIPERACILLIN SODIUM AND TAZOBACTAM SODIUM 3.38 G: 3; .375 INJECTION, SOLUTION INTRAVENOUS at 21:25

## 2023-07-18 RX ADMIN — SODIUM CHLORIDE 500 ML: 9 INJECTION, SOLUTION INTRAVENOUS at 14:00

## 2023-07-18 RX ADMIN — Medication 10 ML: at 21:29

## 2023-07-18 RX ADMIN — Medication 5 MG: at 21:25

## 2023-07-18 RX ADMIN — MORPHINE SULFATE 1 MG: 2 INJECTION, SOLUTION INTRAMUSCULAR; INTRAVENOUS at 23:27

## 2023-07-18 RX ADMIN — PHYTONADIONE 5 MG: 10 INJECTION, EMULSION INTRAMUSCULAR; INTRAVENOUS; SUBCUTANEOUS at 21:25

## 2023-07-18 RX ADMIN — PRAVASTATIN SODIUM 40 MG: 40 TABLET ORAL at 21:25

## 2023-07-18 RX ADMIN — METOPROLOL TARTRATE 25 MG: 25 TABLET, FILM COATED ORAL at 21:25

## 2023-07-18 RX ADMIN — GADOBENATE DIMEGLUMINE 14 ML: 529 INJECTION, SOLUTION INTRAVENOUS at 14:52

## 2023-07-18 RX ADMIN — VANCOMYCIN HYDROCHLORIDE 1500 MG: 10 INJECTION, POWDER, LYOPHILIZED, FOR SOLUTION INTRAVENOUS at 17:46

## 2023-07-18 RX ADMIN — IOPAMIDOL 90 ML: 755 INJECTION, SOLUTION INTRAVENOUS at 13:27

## 2023-07-18 RX ADMIN — PIPERACILLIN SODIUM AND TAZOBACTAM SODIUM 3.38 G: 3; .375 INJECTION, SOLUTION INTRAVENOUS at 15:34

## 2023-07-18 RX ADMIN — SODIUM CHLORIDE 1000 ML: 9 INJECTION, SOLUTION INTRAVENOUS at 15:35

## 2023-07-18 NOTE — Clinical Note
Level of Care: Telemetry [5]   Diagnosis: Cancer associated pain [720870]   Admitting Physician: LATISHA MACHADO [[id number]]   Attending Physician: LATISHA MACHADO [[id number]]

## 2023-07-18 NOTE — ED PROVIDER NOTES
"Subjective   History of Present Illness  Patient is a pleasant 82-year-old male with a history of atrial fibrillation who presents today with multiple complaints including low back pain and bilateral lower extremity weakness.  Patient's had uneventful previous 2 months.  Approximately 2 months ago he has chronic back pain which had been attributed to degenerative disc disease became worse.  He fell on the porch on 7 June, just over 1.5 month ago, and injured his right rotator cuff.  On 15 Sherrie, approximately 1 month ago, he was admitted to [facility] with a nonspecific \"abdominal infection\" and was given IV antibiotics for 2 to 3 days.  He states he had also had some right testicle pain at that time which is now resolved.  Daughter states that he had a CT scan at that time was noted to have a pleural effusion along with a lung lesion.  He was arranged for a biopsy to be performed at the [facility] at  but due to his pain and this has been rescheduled.  From what is described by the daughter it sounds like the patient also has had recurrent pleural effusions.  He was also noted to have a lesion on his lower spine during the recent hospitalization.  Further work-up of this has also not yet been performed.    For the past 2 weeks his back pain has been even worse to the point that he has had difficulty getting around.  His daughter has been helping him simply move around the house.  1 week ago he was prescribed hydrocodone by his primary care provider and has been given mild relief with this.  Yesterday back pain was so severe that he could not stand even with assistance and today although slightly better than yesterday, the pain persisted and this is the reason for presentation to the emergency department.  Daughter also notes that this morning upon waking his O2 saturations were 80% on room air.    Patient is lost 15 pounds and had a poor appetite over the past 1 month.    Denies fever, chills, vomiting, " diarrhea, or other acute complaints.    Review of Systems   All other systems reviewed and are negative.    Past Medical History:   Diagnosis Date    Anemia     Arthritis     Atrial fibrillation     Bowel obstruction     Bradycardia     CHF (congestive heart failure)     COPD (chronic obstructive pulmonary disease)     Coronary artery disease     GERD (gastroesophageal reflux disease)     Hearing loss     Hyperlipidemia     Injury of back     Myocardial infarction     Sciatica        Allergies   Allergen Reactions    Bee Venom Anaphylaxis       Past Surgical History:   Procedure Laterality Date    CARDIAC SURGERY      2006    CORONARY ARTERY BYPASS GRAFT      EYE SURGERY      HEMORRHOIDECTOMY         Family History   Problem Relation Age of Onset    Lung cancer Sister     Lung cancer Brother        Social History     Socioeconomic History    Marital status:    Tobacco Use    Smoking status: Former     Packs/day: 2.00     Years: 15.00     Pack years: 30.00     Types: Cigarettes    Smokeless tobacco: Never   Vaping Use    Vaping Use: Never used   Substance and Sexual Activity    Alcohol use: Never    Drug use: Never    Sexual activity: Defer           Objective   Physical Exam  Vitals and nursing note reviewed.   Constitutional:       General: He is not in acute distress.     Appearance: He is ill-appearing. He is not diaphoretic.   HENT:      Head: Normocephalic and atraumatic.   Eyes:      Extraocular Movements: Extraocular movements intact.      Conjunctiva/sclera: Conjunctivae normal.      Pupils: Pupils are equal, round, and reactive to light.   Neck:      Thyroid: No thyromegaly.   Cardiovascular:      Rate and Rhythm: Tachycardia present. Rhythm irregular.      Heart sounds: Normal heart sounds.      Comments: Weak pulse in all extremities.  Pulmonary:      Effort: Pulmonary effort is normal. No respiratory distress.      Breath sounds: Normal breath sounds.   Abdominal:      General: Bowel sounds are  normal.      Palpations: Abdomen is soft.      Tenderness: There is no abdominal tenderness.   Musculoskeletal:         General: Normal range of motion.      Cervical back: Normal range of motion and neck supple.   Lymphadenopathy:      Cervical: No cervical adenopathy.   Skin:     Capillary Refill: Capillary refill takes less than 2 seconds.      Comments: Limited examination of skin due to examination performed in triage.   Neurological:      Mental Status: He is alert and oriented to person, place, and time.      Comments: Sensitive to light touch but diminished from baseline in bilateral lower extremities.  Patient is able to lift his legs off the examination chair but is generally weak.   Psychiatric:         Mood and Affect: Mood normal.         Behavior: Behavior normal.         Thought Content: Thought content normal.       Procedures           ED Course      Recent Results (from the past 24 hour(s))   ECG 12 Lead Other; back pain    Collection Time: 07/18/23 12:38 PM   Result Value Ref Range    QT Interval 306 ms    QTC Interval 468 ms   Protime-INR    Collection Time: 07/18/23 12:51 PM    Specimen: Blood   Result Value Ref Range    Protime 53.3 (C) 12.2 - 14.5 Seconds    INR 5.91 (C) 0.89 - 1.12   Lavender Top    Collection Time: 07/18/23 12:51 PM   Result Value Ref Range    Extra Tube hold for add-on    Gray Top    Collection Time: 07/18/23 12:51 PM   Result Value Ref Range    Extra Tube Hold for add-ons.    Light Blue Top    Collection Time: 07/18/23 12:51 PM   Result Value Ref Range    Extra Tube Hold for add-ons.    CBC Auto Differential    Collection Time: 07/18/23 12:51 PM    Specimen: Blood   Result Value Ref Range    WBC 16.55 (H) 3.40 - 10.80 10*3/mm3    RBC 4.68 4.14 - 5.80 10*6/mm3    Hemoglobin 13.7 13.0 - 17.7 g/dL    Hematocrit 42.1 37.5 - 51.0 %    MCV 90.0 79.0 - 97.0 fL    MCH 29.3 26.6 - 33.0 pg    MCHC 32.5 31.5 - 35.7 g/dL    RDW 14.0 12.3 - 15.4 %    RDW-SD 45.5 37.0 - 54.0 fl    MPV  9.3 6.0 - 12.0 fL    Platelets 284 140 - 450 10*3/mm3    Neutrophil % 84.5 (H) 42.7 - 76.0 %    Lymphocyte % 5.3 (L) 19.6 - 45.3 %    Monocyte % 7.3 5.0 - 12.0 %    Eosinophil % 0.1 (L) 0.3 - 6.2 %    Basophil % 0.3 0.0 - 1.5 %    Immature Grans % 2.5 (H) 0.0 - 0.5 %    Neutrophils, Absolute 13.99 (H) 1.70 - 7.00 10*3/mm3    Lymphocytes, Absolute 0.87 0.70 - 3.10 10*3/mm3    Monocytes, Absolute 1.21 (H) 0.10 - 0.90 10*3/mm3    Eosinophils, Absolute 0.01 0.00 - 0.40 10*3/mm3    Basophils, Absolute 0.05 0.00 - 0.20 10*3/mm3    Immature Grans, Absolute 0.42 (H) 0.00 - 0.05 10*3/mm3    nRBC 0.0 0.0 - 0.2 /100 WBC   Lactic Acid, Plasma    Collection Time: 07/18/23 12:51 PM    Specimen: Blood   Result Value Ref Range    Lactate 2.6 (C) 0.5 - 2.0 mmol/L   POC Creatinine    Collection Time: 07/18/23 12:53 PM    Specimen: Blood   Result Value Ref Range    Creatinine 0.90 0.60 - 1.30 mg/dL   Comprehensive Metabolic Panel    Collection Time: 07/18/23  1:31 PM    Specimen: Blood   Result Value Ref Range    Glucose 115 (H) 65 - 99 mg/dL    BUN 17 8 - 23 mg/dL    Creatinine 0.98 0.76 - 1.27 mg/dL    Sodium 136 136 - 145 mmol/L    Potassium 4.1 3.5 - 5.2 mmol/L    Chloride 97 (L) 98 - 107 mmol/L    CO2 22.0 22.0 - 29.0 mmol/L    Calcium 10.3 8.6 - 10.5 mg/dL    Total Protein 7.7 6.0 - 8.5 g/dL    Albumin 3.6 3.5 - 5.2 g/dL    ALT (SGPT) 16 1 - 41 U/L    AST (SGOT) 28 1 - 40 U/L    Alkaline Phosphatase 270 (H) 39 - 117 U/L    Total Bilirubin 2.0 (H) 0.0 - 1.2 mg/dL    Globulin 4.1 gm/dL    A/G Ratio 0.9 g/dL    BUN/Creatinine Ratio 17.3 7.0 - 25.0    Anion Gap 17.0 (H) 5.0 - 15.0 mmol/L    eGFR 77.5 >60.0 mL/min/1.73   BNP    Collection Time: 07/18/23  1:31 PM    Specimen: Blood   Result Value Ref Range    proBNP 11,337.0 (H) 0.0 - 1,800.0 pg/mL   Single High Sensitivity Troponin T    Collection Time: 07/18/23  1:31 PM    Specimen: Blood   Result Value Ref Range    HS Troponin T 21 (H) <15 ng/L   Green Top (Gel)    Collection Time:  07/18/23  1:31 PM   Result Value Ref Range    Extra Tube Hold for add-ons.    Gold Top - SST    Collection Time: 07/18/23  1:31 PM   Result Value Ref Range    Extra Tube Hold for add-ons.    CK    Collection Time: 07/18/23  1:31 PM    Specimen: Blood   Result Value Ref Range    Creatine Kinase 94 20 - 200 U/L   Procalcitonin    Collection Time: 07/18/23  1:31 PM    Specimen: Blood   Result Value Ref Range    Procalcitonin 0.27 (H) 0.00 - 0.25 ng/mL   STAT Lactic Acid, Reflex    Collection Time: 07/18/23  3:34 PM    Specimen: Blood   Result Value Ref Range    Lactate 1.4 0.5 - 2.0 mmol/L   Urinalysis With Culture If Indicated - Urine, Clean Catch    Collection Time: 07/18/23  3:48 PM    Specimen: Urine, Clean Catch   Result Value Ref Range    Color, UA Yellow Yellow, Straw    Appearance, UA Clear Clear    pH, UA 5.5 5.0 - 8.0    Specific Gravity, UA 1.073 (H) 1.001 - 1.030    Glucose, UA Negative Negative    Ketones, UA Negative Negative    Bilirubin, UA Negative Negative    Blood, UA Trace (A) Negative    Protein, UA Trace (A) Negative    Leuk Esterase, UA Negative Negative    Nitrite, UA Negative Negative    Urobilinogen, UA 0.2 E.U./dL 0.2 - 1.0 E.U./dL   Urinalysis, Microscopic Only - Urine, Clean Catch    Collection Time: 07/18/23  3:48 PM    Specimen: Urine, Clean Catch   Result Value Ref Range    RBC, UA 0-2 None Seen, 0-2 /HPF    WBC, UA 3-5 (A) None Seen, 0-2 /HPF    Bacteria, UA None Seen None Seen, Trace /HPF    Squamous Epithelial Cells, UA 3-6 (A) None Seen, 0-2 /HPF    Hyaline Casts, UA 0-6 0 - 6 /LPF    Methodology Automated Microscopy    Respiratory Panel PCR w/COVID-19(SARS-CoV-2) CASSIDY/TAMELA/ARTURO/PAD/COR/MAD/GRETCHEN In-House, NP Swab in UTM/VTM, 3-4 HR TAT - Swab, Nasopharynx    Collection Time: 07/18/23  4:19 PM    Specimen: Nasopharynx; Swab   Result Value Ref Range    ADENOVIRUS, PCR Not Detected Not Detected    Coronavirus 229E Not Detected Not Detected    Coronavirus HKU1 Not Detected Not Detected     Coronavirus NL63 Not Detected Not Detected    Coronavirus OC43 Not Detected Not Detected    COVID19 Not Detected Not Detected - Ref. Range    Human Metapneumovirus Not Detected Not Detected    Human Rhinovirus/Enterovirus Not Detected Not Detected    Influenza A PCR Not Detected Not Detected    Influenza B PCR Not Detected Not Detected    Parainfluenza Virus 1 Not Detected Not Detected    Parainfluenza Virus 2 Not Detected Not Detected    Parainfluenza Virus 3 Not Detected Not Detected    Parainfluenza Virus 4 Not Detected Not Detected    RSV, PCR Not Detected Not Detected    Bordetella pertussis pcr Not Detected Not Detected    Bordetella parapertussis PCR Not Detected Not Detected    Chlamydophila pneumoniae PCR Not Detected Not Detected    Mycoplasma pneumo by PCR Not Detected Not Detected     Note: In addition to lab results from this visit, the labs listed above may include labs taken at another facility or during a different encounter within the last 24 hours. Please correlate lab times with ED admission and discharge times for further clarification of the services performed during this visit.    MRI Lumbar Spine With & Without Contrast   Final Result   Impression:   1.Multiple enhancing lesions within lumbar spine most prominent at L3, most compatible with osseous metastasis. There is an associated mild compression deformity at L3, likely a pathological fracture.   2.Mild to moderate multilevel degenerative changes of lumbar spine as described above.            Electronically Signed: Ruddy Rodriguez     7/18/2023 3:32 PM EDT     Workstation ID: [id number]      CT Angiogram Chest   Final Result   Impression:   Prominent consolidation is noted in the right lower lobe, somewhat masslike with evidence of endobronchial obstruction, concerning for underlying possible primary or metastatic neoplasm. There is otherwise no suspicious thoracic adenopathy.      Diffuse areas of centrilobular groundglass opacity are  present, suggesting component of bronchiolitis. No additional acute findings are present. There is no acute pulmonary embolus.            Electronically Signed: Eder Mireles     7/18/2023 2:06 PM EDT     Workstation ID: [id number]      CT Abdomen Pelvis With Contrast   Final Result   Impression:   1.Progressive hypodense lesions within liver and progressive osseous metastasis, compatible with disease progression.   2.No acute process identified within abdomen/pelvis.   3.Enlarged prostate.            Electronically Signed: Ruddy Rodriguez     7/18/2023 2:17 PM EDT     Workstation ID: [id number]      IR Needle Biopsy    (Results Pending)     Vitals:    07/18/23 1944 07/18/23 2002 07/18/23 2200 07/18/23 2315   BP: 104/81   103/57   BP Location: Left arm   Left arm   Patient Position: Lying   Lying   Pulse: 112 106 103 (!) 129   Resp: 18   20   Temp: 98 °F (36.7 °C)   98.2 °F (36.8 °C)   TempSrc: Oral   Oral   SpO2: 92%   91%   Weight:       Height:         Medications   sodium chloride 0.9 % flush 10 mL (has no administration in time range)   dilTIAZem CD (CARDIZEM CD) 24 hr capsule 120 mg (has no administration in time range)   folic acid (FOLVITE) tablet 1 mg (has no administration in time range)   losartan (COZAAR) tablet 25 mg (has no administration in time range)   metoprolol tartrate (LOPRESSOR) tablet 25 mg (25 mg Oral Given 7/18/23 2125)   pravastatin (PRAVACHOL) tablet 40 mg (40 mg Oral Given 7/18/23 2125)   famotidine (PEPCID) tablet 20 mg (has no administration in time range)   sodium chloride 0.9 % flush 10 mL (10 mL Intravenous Given 7/18/23 2129)   sodium chloride 0.9 % flush 10 mL (has no administration in time range)   sodium chloride 0.9 % infusion 40 mL (has no administration in time range)   sennosides-docusate (PERICOLACE) 8.6-50 MG per tablet 2 tablet (2 tablets Oral Not Given 7/18/23 2125)     And   polyethylene glycol (MIRALAX) packet 17 g (has no administration in time range)     And   bisacodyl  (DULCOLAX) EC tablet 5 mg (has no administration in time range)     And   bisacodyl (DULCOLAX) suppository 10 mg (has no administration in time range)   Pharmacy to dose warfarin (has no administration in time range)   piperacillin-tazobactam (ZOSYN) 3.375 g in iso-osmotic dextrose 50 ml (premix) ( Intravenous Currently Infusing 7/18/23 2328)   Potassium Replacement - Follow Nurse / BPA Driven Protocol (has no administration in time range)   Magnesium Standard Dose Replacement - Follow Nurse / BPA Driven Protocol (has no administration in time range)   Phosphorus Replacement - Follow Nurse / BPA Driven Protocol (has no administration in time range)   Calcium Replacement - Follow Nurse / BPA Driven Protocol (has no administration in time range)   acetaminophen (TYLENOL) tablet 650 mg (has no administration in time range)   oxyCODONE-acetaminophen (PERCOCET) 7.5-325 MG per tablet 1 tablet (has no administration in time range)   melatonin tablet 5 mg (5 mg Oral Given 7/18/23 2125)   ondansetron (ZOFRAN) injection 4 mg (has no administration in time range)   aluminum-magnesium hydroxide-simethicone (MAALOX MAX) 400-400-40 MG/5ML suspension 15 mL (has no administration in time range)   lactobacillus acidophilus (RISAQUAD) capsule 1 capsule (has no administration in time range)   morphine injection 1 mg (1 mg Intravenous Given 7/18/23 2327)     And   naloxone (NARCAN) injection 0.4 mg (has no administration in time range)   warfarin (COUMADIN) (dosing per levels) (has no administration in time range)   sodium chloride 0.9 % bolus 500 mL (0 mL Intravenous Stopped 7/18/23 1552)   iopamidol (ISOVUE-370) 76 % injection 90 mL (90 mL Intravenous Given 7/18/23 1327)   gadobenate dimeglumine (MULTIHANCE) injection 14 mL (14 mL Intravenous Given 7/18/23 1452)   sodium chloride 0.9 % bolus 1,000 mL (0 mL Intravenous Stopped 7/18/23 2027)   piperacillin-tazobactam (ZOSYN) 3.375 g in iso-osmotic dextrose 50 ml (premix) (0 g  Intravenous Stopped 7/18/23 1619)   vancomycin IVPB 1500 mg in 0.9% NaCl (Premix) 500 mL (0 mg Intravenous Stopped 7/18/23 2027)   vitamin K1 (PHYTONADIONE) 1 MG/1 ML oral solution 5 mg (5 mg Oral Given 7/18/23 2125)     ECG/EMG Results (last 24 hours)       ** No results found for the last 24 hours. **          ECG 12 Lead Other; back pain   Preliminary Result   Test Reason : Other~   Blood Pressure :   */*   mmHG   Vent. Rate : 141 BPM     Atrial Rate : 115 BPM      P-R Int :   * ms          QRS Dur : 144 ms       QT Int : 306 ms       P-R-T Axes :   * -67  89 degrees      QTc Int : 468 ms      Atrial fibrillation with rapid ventricular response   Left axis deviation   Right bundle branch block   Possible Lateral infarct , age undetermined   Inferior infarct , age undetermined   Abnormal ECG   When compared with ECG of 15-MAR-2018 15:17,   Left anterior fascicular block is no longer present   Borderline criteria for Lateral infarct are now present   Inferior infarct is now present      Referred By: CAROLE           Confirmed By:                                                Medical Decision Making  Problems Addressed:  Acute midline low back pain without sciatica: complicated acute illness or injury  Acute respiratory failure with hypoxia: complicated acute illness or injury  Bronchiolitis: complicated acute illness or injury  Cancer, metastatic to liver: complicated acute illness or injury  Elevated INR: complicated acute illness or injury  Elevated lactic acid level: complicated acute illness or injury  Generalized abdominal pain: complicated acute illness or injury  Generalized weakness: complicated acute illness or injury  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  Labs: ordered. Decision-making details  documented in ED Course.  Radiology: ordered and independent interpretation performed. Decision-making details documented in ED Course.  ECG/medicine tests: ordered and independent interpretation performed. Decision-making details documented in ED Course.    Risk  Prescription drug management.  Decision regarding hospitalization.        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

## 2023-07-18 NOTE — H&P
Psychiatric Medicine Services  HISTORY AND PHYSICAL    Patient Name: Phuc Bowden  : 1940  MRN: [id number]  Primary Care Physician: Blayne Adan MD  Date of admission: 2023    Subjective   Subjective     Chief Complaint:  Intractable back pain    HPI:  Phuc Bowden is a 82 y.o. male with pmh significant for CAD s/p MI and CABG, PAF on warfarin, skin cancer, HTN, HLD, hearing loss, previous tobacco use long term with COPD, GERD, presents with worsening low back pain x 2 months. Patient seen OSH couple of weeks ago for pain, falls and weakness. He was treated for nonspecific abdominal infection and during work up found right lung mass with probable mets to liver/ spine. He was to have follow up for biopsy with  intermediate which was not performed due to warfarin use. He cancelled follow up due to severe back pain and significant decline in mobility. Patient reports within a weeks time has gone from cane to walker and now unable to stand without heavy assist. He has also lost 15 lbs in past month due to loss of appetite.   CT chest and abd do confirm RLL consolidation and probable mass, hypodense lesions in the liver and osseous mets.        Review of Systems   Constitutional:  Positive for activity change, appetite change and unexpected weight change. Negative for fever.   HENT: Negative.     Respiratory: Negative.     Cardiovascular: Negative.    Gastrointestinal:  Positive for abdominal distention.   Genitourinary: Negative.    Musculoskeletal:  Positive for back pain and gait problem.   Skin: Negative.    Neurological:  Positive for weakness.   Hematological:  Bruises/bleeds easily.   Psychiatric/Behavioral: Negative.            Personal History     Past Medical History:   Diagnosis Date    Anemia     Arthritis     Atrial fibrillation     Bowel obstruction     Bradycardia     CHF (congestive heart failure)     COPD (chronic obstructive pulmonary disease)     Coronary artery  disease     GERD (gastroesophageal reflux disease)     Hearing loss     Hyperlipidemia     Injury of back     Myocardial infarction     Sciatica          Oncology Problem List:  Metastasis (07/18/2023; Status: Active)       Past Surgical History:   Procedure Laterality Date    CARDIAC SURGERY      2006    CORONARY ARTERY BYPASS GRAFT      EYE SURGERY      HEMORRHOIDECTOMY         Family History:  family history includes Lung cancer in his brother and sister.     Social History:    Social History     Social History Narrative    Not on file       Medications:  dilTIAZem CD, fish oil, folic acid, losartan, metoprolol tartrate, nitroglycerin, pravastatin, raNITIdine, traMADol, and warfarin    Allergies   Allergen Reactions    Bee Venom Anaphylaxis       Objective   Objective     Vital Signs:   Temp:  [97.5 °F (36.4 °C)] 97.5 °F (36.4 °C)  Heart Rate:  [72-89] 72  Resp:  [16] 16  BP: (121-147)/(69-98) 121/69    Physical Exam   Constitutional: Awake, alert, frail, very [place]  Eyes: PERRLA, sclerae anicteric, no conjunctival injection  HENT: NCAT, mucous membranes moist  Neck: Supple, no thyromegaly, trachea midline  Respiratory: diminished throughout, nonlabored respirations   Cardiovascular: RRR, no murmurs  Gastrointestinal: Positive bowel sounds, soft, nontender, nondistended  Musculoskeletal: No bilateral ankle edema, no clubbing or cyanosis to extremities  Psychiatric: Appropriate affect, cooperative  Neurologic: Oriented x 3, general weakness, symmetric,  speech clear  Skin: No rashes      Result Review:  I have personally reviewed the results from the time of this admission to 7/18/2023 16:23 EDT and agree with these findings:  [x]  Laboratory list / accordion  []  Microbiology  [x]  Radiology  [x]  EKG/Telemetry   []  Cardiology/Vascular   []  Pathology  []  Old records  []  Other:  Most notable findings include:     LAB RESULTS:      Lab 07/18/23  1534 07/18/23  1331 07/18/23  1251   WBC  --   --  16.55*  "  HEMOGLOBIN  --   --  13.7   HEMATOCRIT  --   --  42.1   PLATELETS  --   --  284   NEUTROS ABS  --   --  13.99*   IMMATURE GRANS (ABS)  --   --  0.42*   LYMPHS ABS  --   --  0.87   MONOS ABS  --   --  1.21*   EOS ABS  --   --  0.01   MCV  --   --  90.0   PROCALCITONIN  --  0.27*  --    LACTATE 1.4  --  2.6*   PROTIME  --   --  53.3*         Lab 07/18/23  1331 07/18/23  1253   SODIUM 136  --    POTASSIUM 4.1  --    CHLORIDE 97*  --    CO2 22.0  --    ANION GAP 17.0*  --    BUN 17  --    CREATININE 0.98 0.90   EGFR 77.5  --    GLUCOSE 115*  --    CALCIUM 10.3  --          Lab 07/18/23  1331   TOTAL PROTEIN 7.7   ALBUMIN 3.6   GLOBULIN 4.1   ALT (SGPT) 16   AST (SGOT) 28   BILIRUBIN 2.0*   ALK PHOS 270*         Lab 07/18/23  1331 07/18/23  1251   PROBNP 11,337.0*  --    HSTROP T 21*  --    PROTIME  --  53.3*   INR  --  5.91*                 Brief Urine Lab Results  (Last result in the past 365 days)        Color   Clarity   Blood   Leuk Est   Nitrite   Protein   CREAT   Urine HCG        07/18/23 1548 Yellow   Clear   Trace   Negative   Negative   Trace                 Microbiology Results (last 10 days)       ** No results found for the last 240 hours. **            CT Abdomen Pelvis With Contrast    Result Date: 7/18/2023  CT ABDOMEN PELVIS W CONTRAST Date of Exam: 7/18/2023 1:08 PM EDT Indication: Low back pain with reported lesion to the bar spine at outside 20.  Weight loss and poor appetite.  Admitted 1 month ago side hospital for \"abdominal infection \". Comparison: June 7, 2023 Technique: Axial CT images were obtained of the abdomen and pelvis following the uneventful intravenous administration of 85 mL Isovue-370. Reconstructed coronal and sagittal images were also obtained. Automated exposure control and iterative construction methods were used. Findings: Hypodense lesions within the liver appear increased in size and number. An index lesion within the left hepatic lobe on image 33 measures 3.6 x 3.1 cm, " previously 1.1 x 1.0 cm. The gallbladder, adrenal glands, right kidney, spleen, and pancreas are unremarkable. There are small left renal cyst. The stomach appears normal. The small bowel appears normal in caliber and configuration. The colon appears normal. The appendix appears normal. There is no ascites or loculated collection. No abnormally enlarged lymph nodes are identified. The rectum and urinary bladder are unremarkable. The prostate is enlarged measuring 4.8 cm in transverse dimension. Several osseous metastases have increased in size, for example an index lesion involving the right L3 vertebral body measuring 4.9 cm, previously 3.2 cm. Another metastasis is seen within the left ischium.     Impression: Impression: 1.Progressive hypodense lesions within liver and progressive osseous metastasis, compatible with disease progression. 2.No acute process identified within abdomen/pelvis. 3.Enlarged prostate. Electronically Signed: Ruddy Rodriguez  7/18/2023 2:17 PM EDT  Workstation ID: [id number]    CT Angiogram Chest    Result Date: 7/18/2023  CT ANGIOGRAM CHEST Date of Exam: 7/18/2023 1:08 PM EDT Indication: Per family, oxygen level 80% this morning.  Weight loss over the past 1 month.  Possible malignancy.. Comparison: None available. Technique: Axial pulmonary arterial phase IV contrast enhanced CT angiogram of the chest per PE protocol. Two-dimensional reconstructions were postprocessed. Axial IV contrast-enhanced CT of the abdomen and pelvis with multiplanar reconstruction. Findings: CTA chest: There is no pathologic axillary adenopathy or other worrisome body wall soft tissue finding in the chest. Trace pleural effusion is present on the right. There is no pericardial effusion. Atherosclerotic, nonaneurysmal thoracic aorta. The pulmonary arteries are patent and well opacified, without evidence of filling defect concerning for acute pulmonary embolus. There is no distinct pathologic mediastinal adenopathy.  Evaluation of the osseous structures demonstrates no evidence of acute fracture or aggressive osseous lesion. Evaluation of the lung fields demonstrates fairly extensive confluent consolidation in the right lower lobe, with some evidence of endobronchial obstruction, with underlying mass not entirely excluded. The remaining  lung fields demonstrate moderate emphysema and mild diffuse peribronchial groundglass opacity, suggesting component of bronchiolitis.     Impression: Impression: Prominent consolidation is noted in the right lower lobe, somewhat masslike with evidence of endobronchial obstruction, concerning for underlying possible primary or metastatic neoplasm. There is otherwise no suspicious thoracic adenopathy. Diffuse areas of centrilobular groundglass opacity are present, suggesting component of bronchiolitis. No additional acute findings are present. There is no acute pulmonary embolus. Electronically Signed: Eder Mireles  7/18/2023 2:06 PM EDT  Workstation ID: [id number]    MRI Lumbar Spine With & Without Contrast    Result Date: 7/18/2023  MRI LUMBAR SPINE W WO CONTRAST Date of Exam: 7/18/2023 2:27 PM EDT Indication: Low back pain.  Bilateral lower extremity weakness and decreased sensation.  Reported lesion in the lumbar spine from outside hospital..  Comparison: CT abdomen pelvis from June 7, 2023 Technique:  Routine multiplanar/multisequence sequence images of the lumbar spine were obtained before and after the uneventful administration of 14 mL Multihance.  Findings: The alignment is anatomic. The vertebral body heights appear normal. There are multiple T1 hypointense enhancing lesions most prominent within the L3 vertebral body most compatible with metastasis. There is an associated mild compression deformity at L3,  likely a pathological fracture. No epidural spread of tumor is identified. There are degenerative endplate changes at L3-4 and L4-5. There is disc desiccation at all levels, with  moderate degenerative loss of disc height at L4-5. The conus terminates at  the T12-L1 level. The posterior paravertebral soft tissues are unremarkable. L1-2: Mild disc bulge. Mild bilateral facet arthropathy. No spinal canal stenosis. No neural foraminal stenosis. L2-3: Mild disc bulge eccentric to the left. Mild right and moderate facet arthropathy with ligamentum flavum infolding. Mild spinal canal stenosis. Mild right and mild to moderate left neural foraminal stenosis. L3-4: Mild disc bulge. Moderate bilateral facet arthropathy with ligamentum flavum infolding. Mild to moderate spinal canal stenosis. Mild to moderate right and moderate left neural foramen stenosis. L4-5: Moderate disc bulge. Moderate bilateral facet arthropathy with ligamentum flavum infolding. Mild spinal canal stenosis. Moderate right and mild to moderate left neural foraminal stenosis. L5-S1: Mild disc bulge eccentric to the right narrowing the right lateral recess. Moderate right and mild left facet arthropathy. No spinal canal stenosis. Moderate right and mild left neural foraminal stenosis.     Impression: Impression: 1.Multiple enhancing lesions within lumbar spine most prominent at L3, most compatible with osseous metastasis. There is an associated mild compression deformity at L3, likely a pathological fracture. 2.Mild to moderate multilevel degenerative changes of lumbar spine as described above. Electronically Signed: Ruddy Rodriguez  7/18/2023 3:32 PM EDT  Workstation ID: [id number]         Assessment & Plan   Assessment & Plan       Cancer associated pain    Right lower lobe lung mass    Metastasis    Coronary artery disease with history of myocardial infarction without history of CABG    COPD (chronic obstructive pulmonary disease)    Essential hypertension    Hyperlipidemia    Functional assessment declined    PAF (paroxysmal atrial fibrillation)    Pneumonia of right lung due to infectious organism    Supratherapeutic INR     Lactic acidosis    Leukocytosis    CHF (congestive heart failure)      Intractable Back Pain with functional decline/ FTT  Right lung mass with liver/ spinal mets  -- recent discovery of lung mass in June upon OSH ED with pending work up through [facility], however pain and debility increasing  -- Radiation oncology consult for AM   -- Hem/Onc consulted  -- NPO after MN for possible biopsy with IR (pending INR in AM)  -- start oxycodone and morphine prn pain (failed tramadol and norco 10/325 outpatient). Palliative consult in am for ongoing assistance upon dc  -- PT/OT/ nutrition assessment    Right sided pneumonia, post obstructive  Leukocytosis, lactic acidosis  -- zosyn and vanc started in ED. Continue zosyn. Check MRSA PCR  -- respiratory panel pending  -- blood cx pending  -- S pneumo and Legionella Antigen pending  -- repeat lactic pending    PAF  Supratherapeutic INR  -- hold warfarin, pharmacy consulted  -- vitamin K for INR 5.9; no evidence of bleeding; will need biopsy while inpatient  -- continue metoprolol and cardizem    HTN  HLD  CAD s/p CABG  CHF  -- continue losartan, metoprolol, pravastatin    DVT prophylaxis:  warfarin held    CODE STATUS:    Medical Intervention Limits: NO intubation (DNI); NO cardioversion  Code Status (Patient has no pulse and is not breathing): No CPR (Do Not Attempt to Resuscitate)  Medical Interventions (Patient has pulse or is breathing): Limited Support      Expected Discharge  Expected Discharge Date: 7/25/2023; Expected Discharge Time:       This note has been completed as part of a split-shared workflow.     Electronically signed by PILO Renae, 07/18/23, 4:12 PM EDT.          Attending   Admission Attestation       I have performed an independent face-to-face diagnostic evaluation including performing an independent physical examination as documented here.  The documented plan of care above was reviewed and developed with the advanced practice clinician  (APC).      Brief Summary Statement:   Phuc Bowden is a 82 y.o. male with prior history of tobacco use disorder, CAD status post CABG (2006), A-fib on warfarin, CHF (data deficit), HTN, who presented for evaluation of pain in his back.  Of note, patient had a visit to the ER after falling from a ladder and imaging showed bilateral pleural effusions, right lower lobe consolidation versus mass, liver lesions, spinal lesions.  He was seen at [facility] on 6/15 (Dr. Farrar) for this and interventional pulmonary was planning for bronchoscopy, possible biopsy and thoracentesis....INR 5.9 on admisison w/o evidence of bleeding.  Patient endorsed generalized back pain, bilateral leg pain, cough with streaks of blood in his sputum, weight loss, generalized weakness, malaise.  His daughter was present at bedside.    Remainder of detailed HPI is as noted by APC and has been reviewed and/or edited by me for completeness.    Attending Physical Exam:  Temp:  [97.5 °F (36.4 °C)] 97.5 °F (36.4 °C)  Heart Rate:  [] 105  Resp:  [16] 16  BP: (121-147)/(69-98) 121/81    Constitutional: Awake, alert  Eyes: PERRLA, sclerae anicteric, no conjunctival injection  HENT: NCAT, mucous membranes moist  Neck: Supple, no thyromegaly, no lymphadenopathy, trachea midline  Respiratory: Right lower lobe with crackles, diminished, nonlabored respirations   Cardiovascular: RRR, no murmurs, rubs, or gallops, palpable pedal pulses bilaterally  Gastrointestinal: Positive bowel sounds, soft, nontender, nondistended  Musculoskeletal: No bilateral ankle edema, no clubbing or cyanosis to extremities  Psychiatric: Appropriate affect, cooperative  Neurologic: Oriented x 3, strength symmetric in all extremities, Cranial Nerves grossly intact to confrontation, speech clear, very hard of hearing  Skin: No rashes      Brief Assessment/Plan :  See detailed assessment and plan developed with APC which I have reviewed and/or edited for  completeness.            Vy Alston MD  07/18/23

## 2023-07-18 NOTE — CONSULTS
Subjective     CHIEF COMPLAINT: Back pain    HISTORY OF PRESENT ILLNESS:  The patient is a 82 y.o. male, referred by Vy Alston MD for metastatic lung cancer.  The patient was in his usual state of health until approximately 3 months ago.  He presented with low back pain.  This has been gradually getting worse.  Get worse with activity and improves partially with rest.  He noted some poor appetite.  Lost 30 pounds over the last 2 months.  He presented to [facility] emergency room today CT scans and MRI lumbar spine confirmed metastatic right lower lobe lung cancer with liver and bony mets worse at L3.  I was consulted by the emergency room physician Dr. Johnson further assist in the patient's care.  When I saw the patient today he is laying comfortable in bed.  His daughter and son-in-law are at the bedside.  He had previous skin cancer removed of his nose.  His goal is to focus on quality of life.      REVIEW OF SYSTEMS:  A 14 point review of systems was performed and is negative except as noted above.    Past Medical History:   Diagnosis Date    Anemia     Arthritis     Atrial fibrillation     Bowel obstruction     Bradycardia     CHF (congestive heart failure)     COPD (chronic obstructive pulmonary disease)     Coronary artery disease     GERD (gastroesophageal reflux disease)     Hearing loss     Hyperlipidemia     Injury of back     Myocardial infarction     Sciatica        No current facility-administered medications on file prior to encounter.     Current Outpatient Medications on File Prior to Encounter   Medication Sig Dispense Refill    digoxin (LANOXIN) 125 MCG tablet Take 1 tablet by mouth Every Other Day.      finasteride (PROSCAR) 5 MG tablet Take 1 tablet by mouth Every Night.      folic acid (FOLVITE) 1 MG tablet Take 1 tablet by mouth Daily.      losartan (COZAAR) 25 MG tablet Take 80 mg by mouth Daily.      metoprolol tartrate (LOPRESSOR) 25 MG tablet Take 4 tablets by mouth 2 (Two)  "Times a Day.      montelukast (SINGULAIR) 10 MG tablet Take 1 tablet by mouth 2 (Two) Times a Day.      pravastatin (PRAVACHOL) 40 MG tablet Take 1 tablet by mouth Daily.      tamsulosin (FLOMAX) 0.4 MG capsule 24 hr capsule Take 1 capsule by mouth Every Night.      warfarin (COUMADIN) 4 MG tablet Take 1 tablet by mouth Daily.      HYDROcodone-acetaminophen (NORCO)  MG per tablet Take 1 tablet by mouth Every 6 (Six) Hours As Needed for Moderate Pain.      nitroglycerin (NITROSTAT) 0.4 MG SL tablet Place 1 tablet under the tongue Every 5 (Five) Minutes As Needed for Chest Pain. Take no more than 3 doses in 15 minutes.      traMADol (ULTRAM) 50 MG tablet Take 1 tablet by mouth 2 (Two) Times a Day As Needed for Moderate Pain.      [DISCONTINUED] diltiaZEM CD (CARDIZEM CD) 120 MG 24 hr capsule Take 1 capsule by mouth Daily. 30 capsule 0    [DISCONTINUED] Omega-3 Fatty Acids (FISH OIL) 1000 MG capsule capsule Take  by mouth Daily With Breakfast.      [DISCONTINUED] raNITIdine (ZANTAC) 150 MG tablet Take 1 tablet by mouth Every Night.      [DISCONTINUED] warfarin (COUMADIN) 5 MG tablet Take 1 tablet by mouth Daily. 30 tablet 0       Allergies   Allergen Reactions    Bee Venom Anaphylaxis       Past Surgical History:   Procedure Laterality Date    CARDIAC SURGERY      2006    CORONARY ARTERY BYPASS GRAFT      EYE SURGERY      HEMORRHOIDECTOMY         OB History   No obstetric history on file.       Social History     Socioeconomic History    Marital status:        Family History   Problem Relation Age of Onset    Lung cancer Sister     Lung cancer Brother        Objective     Vitals:    07/18/23 1212 07/18/23 1533 07/18/23 1546   BP: 147/98  121/69   BP Location: Left arm     Patient Position: Sitting     Pulse: 89 72    Resp: 16     Temp: 97.5 °F (36.4 °C)     TempSrc: Oral     SpO2: 95% 92%    Weight: 69.9 kg (154 lb)     Height: 177.8 cm (70\")                          ECOG Performance Status: 3 - " Symptomatic, >50% confined to bed  General: well appearing male in no acute distress  Neuro/Psych: A&O x 3, gait steady, appropriate affect, strength 5/5 in all muscle groups  HEENT: sclerae anicteric, oropharynx clear  Lymphatics: no cervical, supraclavicular, or axillary adenopathy  Cardiovascular: regular rate and rhythm, no murmurs  Lungs: clear to auscultation bilaterally  Abdomen: soft, nontender, nondistended.  No palpable organomegaly  Extremities: no lower extremity edema  Skin: no rashes, lesions, bruising, or petechiae      Admission on 07/18/2023   Component Date Value Ref Range Status    QT Interval 07/18/2023 306  ms Preliminary    QTC Interval 07/18/2023 468  ms Preliminary    Protime 07/18/2023 53.3 (C)  12.2 - 14.5 Seconds Final    INR 07/18/2023 5.91 (C)  0.89 - 1.12 Final    Glucose 07/18/2023 115 (H)  65 - 99 mg/dL Final    BUN 07/18/2023 17  8 - 23 mg/dL Final    Creatinine 07/18/2023 0.98  0.76 - 1.27 mg/dL Final    Sodium 07/18/2023 136  136 - 145 mmol/L Final    Potassium 07/18/2023 4.1  3.5 - 5.2 mmol/L Final    Chloride 07/18/2023 97 (L)  98 - 107 mmol/L Final    CO2 07/18/2023 22.0  22.0 - 29.0 mmol/L Final    Calcium 07/18/2023 10.3  8.6 - 10.5 mg/dL Final    Total Protein 07/18/2023 7.7  6.0 - 8.5 g/dL Final    Albumin 07/18/2023 3.6  3.5 - 5.2 g/dL Final    ALT (SGPT) 07/18/2023 16  1 - 41 U/L Final    AST (SGOT) 07/18/2023 28  1 - 40 U/L Final    Alkaline Phosphatase 07/18/2023 270 (H)  39 - 117 U/L Final    Total Bilirubin 07/18/2023 2.0 (H)  0.0 - 1.2 mg/dL Final    Globulin 07/18/2023 4.1  gm/dL Final    Calculated Result    A/G Ratio 07/18/2023 0.9  g/dL Final    BUN/Creatinine Ratio 07/18/2023 17.3  7.0 - 25.0 Final    Anion Gap 07/18/2023 17.0 (H)  5.0 - 15.0 mmol/L Final    eGFR 07/18/2023 77.5  >60.0 mL/min/1.73 Final    proBNP 07/18/2023 11,337.0 (H)  0.0 - 1,800.0 pg/mL Final    HS Troponin T 07/18/2023 21 (H)  <15 ng/L Final    Extra Tube 07/18/2023 Hold for add-ons.   Final     Auto resulted.    Extra Tube 07/18/2023 hold for add-on   Final    Auto resulted    Extra Tube 07/18/2023 Hold for add-ons.   Final    Auto resulted.    Extra Tube 07/18/2023 Hold for add-ons.   Final    Auto resulted.    Extra Tube 07/18/2023 Hold for add-ons.   Final    Auto resulted    WBC 07/18/2023 16.55 (H)  3.40 - 10.80 10*3/mm3 Final    RBC 07/18/2023 4.68  4.14 - 5.80 10*6/mm3 Final    Hemoglobin 07/18/2023 13.7  13.0 - 17.7 g/dL Final    Hematocrit 07/18/2023 42.1  37.5 - 51.0 % Final    MCV 07/18/2023 90.0  79.0 - 97.0 fL Final    MCH 07/18/2023 29.3  26.6 - 33.0 pg Final    MCHC 07/18/2023 32.5  31.5 - 35.7 g/dL Final    RDW 07/18/2023 14.0  12.3 - 15.4 % Final    RDW-SD 07/18/2023 45.5  37.0 - 54.0 fl Final    MPV 07/18/2023 9.3  6.0 - 12.0 fL Final    Platelets 07/18/2023 284  140 - 450 10*3/mm3 Final    Neutrophil % 07/18/2023 84.5 (H)  42.7 - 76.0 % Final    Lymphocyte % 07/18/2023 5.3 (L)  19.6 - 45.3 % Final    Monocyte % 07/18/2023 7.3  5.0 - 12.0 % Final    Eosinophil % 07/18/2023 0.1 (L)  0.3 - 6.2 % Final    Basophil % 07/18/2023 0.3  0.0 - 1.5 % Final    Immature Grans % 07/18/2023 2.5 (H)  0.0 - 0.5 % Final    Neutrophils, Absolute 07/18/2023 13.99 (H)  1.70 - 7.00 10*3/mm3 Final    Lymphocytes, Absolute 07/18/2023 0.87  0.70 - 3.10 10*3/mm3 Final    Monocytes, Absolute 07/18/2023 1.21 (H)  0.10 - 0.90 10*3/mm3 Final    Eosinophils, Absolute 07/18/2023 0.01  0.00 - 0.40 10*3/mm3 Final    Basophils, Absolute 07/18/2023 0.05  0.00 - 0.20 10*3/mm3 Final    Immature Grans, Absolute 07/18/2023 0.42 (H)  0.00 - 0.05 10*3/mm3 Final    nRBC 07/18/2023 0.0  0.0 - 0.2 /100 WBC Final    Creatine Kinase 07/18/2023 94  20 - 200 U/L Final    Lactate 07/18/2023 2.6 (C)  0.5 - 2.0 mmol/L Final    Falsely depressed results may occur on samples drawn from patients receiving N-Acetylcysteine (NAC) or Metamizole.    Procalcitonin 07/18/2023 0.27 (H)  0.00 - 0.25 ng/mL Final    Lactate 07/18/2023 1.4  0.5 -  2.0 mmol/L Final    Falsely depressed results may occur on samples drawn from patients receiving N-Acetylcysteine (NAC) or Metamizole.    Color, UA 07/18/2023 Yellow  Yellow, Straw Final    Appearance, UA 07/18/2023 Clear  Clear Final    pH, UA 07/18/2023 5.5  5.0 - 8.0 Final    Specific Gravity, UA 07/18/2023 1.073 (H)  1.001 - 1.030 Final    Glucose, UA 07/18/2023 Negative  Negative Final    Ketones, UA 07/18/2023 Negative  Negative Final    Bilirubin, UA 07/18/2023 Negative  Negative Final    Blood, UA 07/18/2023 Trace (A)  Negative Final    Protein, UA 07/18/2023 Trace (A)  Negative Final    Leuk Esterase, UA 07/18/2023 Negative  Negative Final    Nitrite, UA 07/18/2023 Negative  Negative Final    Urobilinogen, UA 07/18/2023 0.2 E.U./dL  0.2 - 1.0 E.U./dL Final    ADENOVIRUS, PCR 07/18/2023 Not Detected  Not Detected Final    Coronavirus 229E 07/18/2023 Not Detected  Not Detected Final    Coronavirus HKU1 07/18/2023 Not Detected  Not Detected Final    Coronavirus NL63 07/18/2023 Not Detected  Not Detected Final    Coronavirus OC43 07/18/2023 Not Detected  Not Detected Final    COVID19 07/18/2023 Not Detected  Not Detected - Ref. Range Final    Human Metapneumovirus 07/18/2023 Not Detected  Not Detected Final    Human Rhinovirus/Enterovirus 07/18/2023 Not Detected  Not Detected Final    Influenza A PCR 07/18/2023 Not Detected  Not Detected Final    Influenza B PCR 07/18/2023 Not Detected  Not Detected Final    Parainfluenza Virus 1 07/18/2023 Not Detected  Not Detected Final    Parainfluenza Virus 2 07/18/2023 Not Detected  Not Detected Final    Parainfluenza Virus 3 07/18/2023 Not Detected  Not Detected Final    Parainfluenza Virus 4 07/18/2023 Not Detected  Not Detected Final    RSV, PCR 07/18/2023 Not Detected  Not Detected Final    Bordetella pertussis pcr 07/18/2023 Not Detected  Not Detected Final    Bordetella parapertussis PCR 07/18/2023 Not Detected  Not Detected Final    Chlamydophila pneumoniae PCR  07/18/2023 Not Detected  Not Detected Final    Mycoplasma pneumo by PCR 07/18/2023 Not Detected  Not Detected Final    RBC, UA 07/18/2023 0-2  None Seen, 0-2 /HPF Final    WBC, UA 07/18/2023 3-5 (A)  None Seen, 0-2 /HPF Final    Urine culture not indicated.    Bacteria, UA 07/18/2023 None Seen  None Seen, Trace /HPF Final    Squamous Epithelial Cells, UA 07/18/2023 3-6 (A)  None Seen, 0-2 /HPF Final    Hyaline Casts, UA 07/18/2023 0-6  0 - 6 /LPF Final    Methodology 07/18/2023 Automated Microscopy   Final    Creatinine 07/18/2023 0.90  0.60 - 1.30 mg/dL Final    Serial Number: [id number]:  [id number]        No results found.    ASSESSMENT 82 years old gentleman with metastatic lung cancer    PLAN  1.  Metastatic right lower lobe lung cancer [id number] stage IVb:  A.  I reviewed the patient's chart including DrsMelissa Notes blood results and imaging reports.  B.  I discussed the case with Dr. Johnson from the emergency room today over the phone to coordinate patient's care.  C.  I reviewed the patient's CT scan films myself as well as MRI spine.  I went over the pictures with the patient and his family.  My personal interpretation as above.  D.  I explained to the patient that if he wants to be aggressive we will have to do CT-guided biopsy of the right lower lobe lung mass.  This will be followed by systemic treatment based on histology.  Goal of treatment be palliative.  On the other hand we can focus on quality of life which I am in favor of given his advanced stage, age and poor performance status.  The patient is interested in best supportive care alone however he would like to discuss this further with his wife and other children.  E.  I do recommend radiation oncology consult for thigh radiation to the lumbar spine.  F.  I do recommend hospice care care consult after patient have discussion with his family.    2.  Cancer related pain:  A.  Recommend palliative care consult for now and possibly transitioning to  hospice.    3.  Postobstructive right lower lobe pneumonia:  A.  The patient does have leukocytosis elevated lactic acid.  B.  Agree with IV antibiotics.    4.  Elevated alkaline phosphatase:  A.  Induced by bone metastases.    Herlinda Olivier MD    7/18/2023

## 2023-07-18 NOTE — PROGRESS NOTES
"Pharmacy Consult  -  Warfarin    Phuc Bowden is a  82 y.o. male   Height - 177.8 cm (70\")  Weight - 69.9 kg (154 lb)    Consulting Provider: Hospitalist  Indication: Afib   Goal INR: 2-3  Home Regimen:   - needs clarified with patient     Bridge Therapy: No     Drug-Drug Interactions with current regimen:  Pravastatin (PTA med) - may enhance AC effects of warfarin   Zosyn - may enhance AC effects of warfarin    Warfarin Dosing During Admission:  Date  7/18           INR  5.91           Dose  HOLD  PO Vit K 5mg               Education Provided: to be provided by day-shift floor pharmacist    Discharge Follow up:   Following Provider - TBD     Follow up time range or appointment - 2-3 days after hospital discharge    Labs:  Results from last 7 days   Lab Units 07/18/23  1251   INR  5.91*   HEMOGLOBIN g/dL 13.7   HEMATOCRIT % 42.1     Results from last 7 days   Lab Units 07/18/23  1331 07/18/23  1253   SODIUM mmol/L 136  --    POTASSIUM mmol/L 4.1  --    CHLORIDE mmol/L 97*  --    CO2 mmol/L 22.0  --    BUN mg/dL 17  --    CREATININE mg/dL 0.98 0.90   CALCIUM mg/dL 10.3  --    BILIRUBIN mg/dL 2.0*  --    ALK PHOS U/L 270*  --    ALT (SGPT) U/L 16  --    AST (SGOT) U/L 28  --    GLUCOSE mg/dL 115*  --      Current dietary intake: new admit - intake not documented   Diet Order   Procedures    Diet: Cardiac Diets; Healthy Heart (2-3 Na+); Texture: Regular Texture (IDDSI 7); Fluid Consistency: Thin (IDDSI 0)    NPO Diet NPO Type: Sips with Meds     Assessment/Plan:   Pharmacy to dose warfarin for Afib. Goal INR 2-3.  Supratherapeutic INR of 5.91 on admission today.   Will HOLD dose of warfarin tonight. Patient given vitamin K 5mg PO x 1 dose.   Daily PT/INR ordered.  Pharmacy will continue to monitor signs/symptoms of bleeding, dietary intake, and drug-drug interactions and make dose adjustments as necessary.    Thank you for this consult,     Taylor Riedel, EugenioD, BCPS  7/18/2023  19:37 EDT    "

## 2023-07-19 LAB
ALBUMIN SERPL-MCNC: 2.9 G/DL (ref 3.5–5.2)
ALBUMIN/GLOB SERPL: 1 G/DL
ALP SERPL-CCNC: 230 U/L (ref 39–117)
ALT SERPL W P-5'-P-CCNC: 12 U/L (ref 1–41)
ANION GAP SERPL CALCULATED.3IONS-SCNC: 17 MMOL/L (ref 5–15)
AST SERPL-CCNC: 24 U/L (ref 1–40)
BASOPHILS # BLD AUTO: 0.03 10*3/MM3 (ref 0–0.2)
BASOPHILS NFR BLD AUTO: 0.2 % (ref 0–1.5)
BILIRUB SERPL-MCNC: 1.6 MG/DL (ref 0–1.2)
BUN SERPL-MCNC: 15 MG/DL (ref 8–23)
BUN/CREAT SERPL: 26.8 (ref 7–25)
CALCIUM SPEC-SCNC: 8.9 MG/DL (ref 8.6–10.5)
CHLORIDE SERPL-SCNC: 102 MMOL/L (ref 98–107)
CO2 SERPL-SCNC: 18 MMOL/L (ref 22–29)
CREAT SERPL-MCNC: 0.56 MG/DL (ref 0.76–1.27)
DEPRECATED RDW RBC AUTO: 44.1 FL (ref 37–54)
EGFRCR SERPLBLD CKD-EPI 2021: 99 ML/MIN/1.73
EOSINOPHIL # BLD AUTO: 0 10*3/MM3 (ref 0–0.4)
EOSINOPHIL NFR BLD AUTO: 0 % (ref 0.3–6.2)
ERYTHROCYTE [DISTWIDTH] IN BLOOD BY AUTOMATED COUNT: 13.9 % (ref 12.3–15.4)
GLOBULIN UR ELPH-MCNC: 2.8 GM/DL
GLUCOSE SERPL-MCNC: 100 MG/DL (ref 65–99)
HCT VFR BLD AUTO: 34.6 % (ref 37.5–51)
HGB BLD-MCNC: 11.7 G/DL (ref 13–17.7)
IMM GRANULOCYTES # BLD AUTO: 0.16 10*3/MM3 (ref 0–0.05)
IMM GRANULOCYTES NFR BLD AUTO: 1.3 % (ref 0–0.5)
INR PPP: 3.49 (ref 0.89–1.12)
L PNEUMO1 AG UR QL IA: NEGATIVE
LYMPHOCYTES # BLD AUTO: 0.78 10*3/MM3 (ref 0.7–3.1)
LYMPHOCYTES NFR BLD AUTO: 6.2 % (ref 19.6–45.3)
MAGNESIUM SERPL-MCNC: 1.8 MG/DL (ref 1.6–2.4)
MCH RBC QN AUTO: 29.7 PG (ref 26.6–33)
MCHC RBC AUTO-ENTMCNC: 33.8 G/DL (ref 31.5–35.7)
MCV RBC AUTO: 87.8 FL (ref 79–97)
MONOCYTES # BLD AUTO: 1.07 10*3/MM3 (ref 0.1–0.9)
MONOCYTES NFR BLD AUTO: 8.4 % (ref 5–12)
MRSA DNA SPEC QL NAA+PROBE: NEGATIVE
NEUTROPHILS NFR BLD AUTO: 10.63 10*3/MM3 (ref 1.7–7)
NEUTROPHILS NFR BLD AUTO: 83.9 % (ref 42.7–76)
NRBC BLD AUTO-RTO: 0 /100 WBC (ref 0–0.2)
PLATELET # BLD AUTO: 206 10*3/MM3 (ref 140–450)
PMV BLD AUTO: 9.9 FL (ref 6–12)
POTASSIUM SERPL-SCNC: 3.6 MMOL/L (ref 3.5–5.2)
POTASSIUM SERPL-SCNC: 3.7 MMOL/L (ref 3.5–5.2)
PROT SERPL-MCNC: 5.7 G/DL (ref 6–8.5)
PROTHROMBIN TIME: 35.3 SECONDS (ref 12.2–14.5)
RBC # BLD AUTO: 3.94 10*6/MM3 (ref 4.14–5.8)
S PNEUM AG SPEC QL LA: NEGATIVE
SODIUM SERPL-SCNC: 137 MMOL/L (ref 136–145)
WBC NRBC COR # BLD: 12.67 10*3/MM3 (ref 3.4–10.8)

## 2023-07-19 PROCEDURE — 83735 ASSAY OF MAGNESIUM: CPT | Performed by: NURSE PRACTITIONER

## 2023-07-19 PROCEDURE — 77334 RADIATION TREATMENT AID(S): CPT | Performed by: RADIOLOGY

## 2023-07-19 PROCEDURE — 25010000002 CEFTRIAXONE PER 250 MG: Performed by: INTERNAL MEDICINE

## 2023-07-19 PROCEDURE — 85025 COMPLETE CBC W/AUTO DIFF WBC: CPT | Performed by: NURSE PRACTITIONER

## 2023-07-19 PROCEDURE — 77300 RADIATION THERAPY DOSE PLAN: CPT | Performed by: RADIOLOGY

## 2023-07-19 PROCEDURE — 85610 PROTHROMBIN TIME: CPT | Performed by: STUDENT IN AN ORGANIZED HEALTH CARE EDUCATION/TRAINING PROGRAM

## 2023-07-19 PROCEDURE — 25010000002 PIPERACILLIN SOD-TAZOBACTAM PER 1 G: Performed by: NURSE PRACTITIONER

## 2023-07-19 PROCEDURE — 92610 EVALUATE SWALLOWING FUNCTION: CPT

## 2023-07-19 PROCEDURE — 99232 SBSQ HOSP IP/OBS MODERATE 35: CPT | Performed by: INTERNAL MEDICINE

## 2023-07-19 PROCEDURE — G0378 HOSPITAL OBSERVATION PER HR: HCPCS

## 2023-07-19 PROCEDURE — 80053 COMPREHEN METABOLIC PANEL: CPT | Performed by: NURSE PRACTITIONER

## 2023-07-19 PROCEDURE — 77417 THER RADIOLOGY PORT IMAGE(S): CPT | Performed by: RADIOLOGY

## 2023-07-19 PROCEDURE — 87641 MR-STAPH DNA AMP PROBE: CPT | Performed by: STUDENT IN AN ORGANIZED HEALTH CARE EDUCATION/TRAINING PROGRAM

## 2023-07-19 PROCEDURE — 84132 ASSAY OF SERUM POTASSIUM: CPT | Performed by: INTERNAL MEDICINE

## 2023-07-19 PROCEDURE — 99214 OFFICE O/P EST MOD 30 MIN: CPT | Performed by: INTERNAL MEDICINE

## 2023-07-19 PROCEDURE — 77412 RADIATION TX DELIVERY LVL 3: CPT | Performed by: RADIOLOGY

## 2023-07-19 PROCEDURE — 77295 3-D RADIOTHERAPY PLAN: CPT | Performed by: RADIOLOGY

## 2023-07-19 PROCEDURE — 77290 THER RAD SIMULAJ FIELD CPLX: CPT | Performed by: RADIOLOGY

## 2023-07-19 PROCEDURE — 0 POTASSIUM CHLORIDE 10 MEQ/100ML SOLUTION: Performed by: INTERNAL MEDICINE

## 2023-07-19 RX ORDER — DOXYCYCLINE 100 MG/1
100 CAPSULE ORAL EVERY 12 HOURS SCHEDULED
Status: DISCONTINUED | OUTPATIENT
Start: 2023-07-19 | End: 2023-07-20

## 2023-07-19 RX ORDER — LORAZEPAM 0.5 MG/1
0.25 TABLET ORAL EVERY 6 HOURS PRN
Status: DISCONTINUED | OUTPATIENT
Start: 2023-07-19 | End: 2023-07-20

## 2023-07-19 RX ORDER — NALOXONE HCL 0.4 MG/ML
0.4 VIAL (ML) INJECTION
Status: DISCONTINUED | OUTPATIENT
Start: 2023-07-19 | End: 2023-07-21 | Stop reason: HOSPADM

## 2023-07-19 RX ORDER — MORPHINE SULFATE 2 MG/ML
2 INJECTION, SOLUTION INTRAMUSCULAR; INTRAVENOUS
Status: DISCONTINUED | OUTPATIENT
Start: 2023-07-19 | End: 2023-07-21 | Stop reason: HOSPADM

## 2023-07-19 RX ORDER — POTASSIUM CHLORIDE 7.45 MG/ML
10 INJECTION INTRAVENOUS ONCE
Status: COMPLETED | OUTPATIENT
Start: 2023-07-19 | End: 2023-07-19

## 2023-07-19 RX ORDER — POTASSIUM CHLORIDE 7.45 MG/ML
10 INJECTION INTRAVENOUS
Status: DISPENSED | OUTPATIENT
Start: 2023-07-19 | End: 2023-07-19

## 2023-07-19 RX ORDER — LORAZEPAM 0.5 MG/1
0.25 TABLET ORAL ONCE
Status: COMPLETED | OUTPATIENT
Start: 2023-07-20 | End: 2023-07-19

## 2023-07-19 RX ADMIN — POTASSIUM CHLORIDE 10 MEQ: 7.46 INJECTION, SOLUTION INTRAVENOUS at 10:12

## 2023-07-19 RX ADMIN — FAMOTIDINE 20 MG: 20 TABLET, FILM COATED ORAL at 17:52

## 2023-07-19 RX ADMIN — DOXYCYCLINE 100 MG: 100 CAPSULE ORAL at 21:39

## 2023-07-19 RX ADMIN — FAMOTIDINE 20 MG: 20 TABLET, FILM COATED ORAL at 05:42

## 2023-07-19 RX ADMIN — METOPROLOL TARTRATE 25 MG: 25 TABLET, FILM COATED ORAL at 09:58

## 2023-07-19 RX ADMIN — Medication 10 ML: at 21:40

## 2023-07-19 RX ADMIN — METOPROLOL TARTRATE 25 MG: 25 TABLET, FILM COATED ORAL at 21:40

## 2023-07-19 RX ADMIN — POTASSIUM CHLORIDE 10 MEQ: 7.46 INJECTION, SOLUTION INTRAVENOUS at 12:58

## 2023-07-19 RX ADMIN — LOSARTAN POTASSIUM 25 MG: 25 TABLET, FILM COATED ORAL at 09:58

## 2023-07-19 RX ADMIN — POTASSIUM CHLORIDE 10 MEQ: 7.46 INJECTION, SOLUTION INTRAVENOUS at 11:43

## 2023-07-19 RX ADMIN — POTASSIUM CHLORIDE 10 MEQ: 7.46 INJECTION, SOLUTION INTRAVENOUS at 16:33

## 2023-07-19 RX ADMIN — SENNOSIDES AND DOCUSATE SODIUM 2 TABLET: 50; 8.6 TABLET ORAL at 09:58

## 2023-07-19 RX ADMIN — PIPERACILLIN SODIUM AND TAZOBACTAM SODIUM 3.38 G: 3; .375 INJECTION, SOLUTION INTRAVENOUS at 05:42

## 2023-07-19 RX ADMIN — Medication 5 MG: at 21:39

## 2023-07-19 RX ADMIN — DOXYCYCLINE 100 MG: 100 CAPSULE ORAL at 09:58

## 2023-07-19 RX ADMIN — PRAVASTATIN SODIUM 40 MG: 40 TABLET ORAL at 21:39

## 2023-07-19 RX ADMIN — SENNOSIDES AND DOCUSATE SODIUM 2 TABLET: 50; 8.6 TABLET ORAL at 21:40

## 2023-07-19 RX ADMIN — DILTIAZEM HYDROCHLORIDE 120 MG: 120 CAPSULE, COATED, EXTENDED RELEASE ORAL at 09:58

## 2023-07-19 RX ADMIN — Medication 1 CAPSULE: at 09:57

## 2023-07-19 RX ADMIN — SODIUM CHLORIDE 1000 MG: 900 INJECTION INTRAVENOUS at 18:26

## 2023-07-19 RX ADMIN — LORAZEPAM 0.25 MG: 0.5 TABLET ORAL at 19:09

## 2023-07-19 RX ADMIN — FOLIC ACID 1 MG: 1 TABLET ORAL at 09:58

## 2023-07-19 RX ADMIN — LORAZEPAM 0.25 MG: 0.5 TABLET ORAL at 23:49

## 2023-07-19 RX ADMIN — Medication 10 ML: at 10:01

## 2023-07-19 NOTE — PLAN OF CARE
Goal Outcome Evaluation:  Plan of Care Reviewed With: patient        Progress: no change (eval)   SLP evaluation completed. Will sign-off dysphagia. Please see note for further details and recommendations.

## 2023-07-19 NOTE — PROGRESS NOTES
DATE OF VISIT: 7/19/2023    Chief Complaint: Followup for metastatic right lower lobe lung cancer     SUBJECTIVE: The patient is laying comfortable in bed.  He had a rough night secondary to confusion and agitation. His daughter is at the bedside.    REVIEW OF SYSTEMS: All the other 9 systems are reviewed by me and negative  except what is mentioned in HPI.     Past History:  Medical History: has a past medical history of Anemia, Arthritis, Atrial fibrillation, Bowel obstruction, Bradycardia, CHF (congestive heart failure), COPD (chronic obstructive pulmonary disease), Coronary artery disease, GERD (gastroesophageal reflux disease), Hearing loss, Hyperlipidemia, Injury of back, Myocardial infarction, and Sciatica.   Surgical History: has a past surgical history that includes Coronary artery bypass graft; Cardiac surgery; Eye surgery; and Hemorrhoid surgery.   Family History: family history includes Lung cancer in his brother and sister.   Social History: reports that he has quit smoking. His smoking use included cigarettes. He has a 30.00 pack-year smoking history. He has never used smokeless tobacco. He reports that he does not drink alcohol and does not use drugs.    Medications Prior to Admission   Medication Sig Dispense Refill Last Dose    digoxin (LANOXIN) 125 MCG tablet Take 1 tablet by mouth Every Other Day.   7/17/2023 at 0900    finasteride (PROSCAR) 5 MG tablet Take 1 tablet by mouth Every Night.   7/17/2023 at 2300    folic acid (FOLVITE) 1 MG tablet Take 1 tablet by mouth Daily.   7/18/2023 at 0920    losartan (COZAAR) 25 MG tablet Take 80 mg by mouth Daily.   7/18/2023 at 0920    metoprolol tartrate (LOPRESSOR) 25 MG tablet Take 4 tablets by mouth 2 (Two) Times a Day.   7/18/2023 at 0920    montelukast (SINGULAIR) 10 MG tablet Take 1 tablet by mouth 2 (Two) Times a Day.   7/18/2023 at 0920    pravastatin (PRAVACHOL) 40 MG tablet Take 1 tablet by mouth Daily.   7/17/2023 at 2300    tamsulosin (FLOMAX)  0.4 MG capsule 24 hr capsule Take 1 capsule by mouth Every Night.   7/17/2023 at 2300    warfarin (COUMADIN) 4 MG tablet Take 1 tablet by mouth Daily.   7/17/2023 at 2300    HYDROcodone-acetaminophen (NORCO)  MG per tablet Take 1 tablet by mouth Every 6 (Six) Hours As Needed for Moderate Pain.       nitroglycerin (NITROSTAT) 0.4 MG SL tablet Place 1 tablet under the tongue Every 5 (Five) Minutes As Needed for Chest Pain. Take no more than 3 doses in 15 minutes.   Unknown    traMADol (ULTRAM) 50 MG tablet Take 1 tablet by mouth 2 (Two) Times a Day As Needed for Moderate Pain.   Unknown      Allergies: Bee venom     Current Facility-Administered Medications:     acetaminophen (TYLENOL) tablet 650 mg, 650 mg, Oral, Q4H PRN, Charis Iniguez APRN    aluminum-magnesium hydroxide-simethicone (MAALOX MAX) 400-400-40 MG/5ML suspension 15 mL, 15 mL, Oral, Q6H PRN, Charis Iniguez APRN    sennosides-docusate (PERICOLACE) 8.6-50 MG per tablet 2 tablet, 2 tablet, Oral, BID, 2 tablet at 07/19/23 0958 **AND** polyethylene glycol (MIRALAX) packet 17 g, 17 g, Oral, Daily PRN **AND** bisacodyl (DULCOLAX) EC tablet 5 mg, 5 mg, Oral, Daily PRN **AND** bisacodyl (DULCOLAX) suppository 10 mg, 10 mg, Rectal, Daily PRN, Charis Iniguez APRN    Calcium Replacement - Follow Nurse / BPA Driven Protocol, , Does not apply, PRN, Charis Iniguez APRN    cefTRIAXone (ROCEPHIN) 1000 mg/100 mL 0.9% NS (MBP), 1,000 mg, Intravenous, Q24H, Maria E Silva DO    dilTIAZem CD (CARDIZEM CD) 24 hr capsule 120 mg, 120 mg, Oral, Daily, Charis Iniguez APRN, 120 mg at 07/19/23 0958    doxycycline (MONODOX) capsule 100 mg, 100 mg, Oral, Q12H, Maria E Silva DO, 100 mg at 07/19/23 0958    famotidine (PEPCID) tablet 20 mg, 20 mg, Oral, BID AC, Charis Iniguez APRN, 20 mg at 07/19/23 0542    folic acid (FOLVITE) tablet 1 mg, 1 mg, Oral, Daily, Charis Iniguez APRN, 1 mg at 07/19/23 0958    lactobacillus acidophilus (RISAQUAD) capsule  1 capsule, 1 capsule, Oral, Daily, Humberto Iniguezca DUGLAS, APRN, 1 capsule at 07/19/23 0957    losartan (COZAAR) tablet 25 mg, 25 mg, Oral, Daily, Humberto Iniguezca R, APRN, 25 mg at 07/19/23 0958    Magnesium Standard Dose Replacement - Follow Nurse / BPA Driven Protocol, , Does not apply, PRN, Humberto Iniguezca R, APRN    melatonin tablet 5 mg, 5 mg, Oral, Nightly PRN, Aminata Iniguezecca R, APRN, 5 mg at 07/18/23 2125    metoprolol tartrate (LOPRESSOR) tablet 25 mg, 25 mg, Oral, Q12H, Humberto Iniguezca R, APRN, 25 mg at 07/19/23 0958    morphine injection 2 mg, 2 mg, Intravenous, Q2H PRN **AND** naloxone (NARCAN) injection 0.4 mg, 0.4 mg, Intravenous, Q5 Min PRN, Jazmine Jamison APRN    ondansetron (ZOFRAN) injection 4 mg, 4 mg, Intravenous, Q6H PRN, Humberto Iniguezca R, APRN    oxyCODONE-acetaminophen (PERCOCET) 7.5-325 MG per tablet 1 tablet, 1 tablet, Oral, Q4H PRN, Charis Iniguez, APRN    Pharmacy to dose warfarin, , Does not apply, Continuous PRN, Aminata Iniguezecca R, APRN    Phosphorus Replacement - Follow Nurse / BPA Driven Protocol, , Does not apply, PRN, Aminata Iniguezecca R, APRN    potassium chloride 10 mEq in 100 mL IVPB, 10 mEq, Intravenous, Q1H, Maria E Silva DO, Last Rate: 100 mL/hr at 07/19/23 1143, 10 mEq at 07/19/23 1143    Potassium Replacement - Follow Nurse / BPA Driven Protocol, , Does not apply, PRNHira Charis R, APRN    pravastatin (PRAVACHOL) tablet 40 mg, 40 mg, Oral, Nightly, Aminata Iniguezecca R, APRN, 40 mg at 07/18/23 2125    sodium chloride 0.9 % flush 10 mL, 10 mL, Intravenous, PRN, Larry Johnson,     sodium chloride 0.9 % flush 10 mL, 10 mL, Intravenous, Q12H, Hira, Charis R, APRN, 10 mL at 07/19/23 1001    sodium chloride 0.9 % flush 10 mL, 10 mL, Intravenous, PRN, Hira, Charis R, APRN    sodium chloride 0.9 % infusion 40 mL, 40 mL, Intravenous, PRN, Iniguez, Charis R, APRN    warfarin (COUMADIN) (dosing per levels), , Does not apply, Daily, Vy Alston MD    PHYSICAL EXAMINATION:  "  /74 (BP Location: Left arm, Patient Position: Lying)   Pulse 114   Temp 97.9 °F (36.6 °C) (Oral)   Resp 18   Ht 177.8 cm (70\")   Wt 69.9 kg (154 lb)   SpO2 97%   BMI 22.10 kg/m²                ECOG Performance Status: 3 - Symptomatic, >50% confined to bed  GENERAL: Age appropriate. No acute distress.   NEURO/PSYCH: A&O x 3, strength 5/5 in all muscle groups  HEENT: Head atraumatic, normocephalic.   NECK: Supple. No JVD. No lymphadenopathy.   LUNGS: Clear to auscultation bilaterally. No wheezing. No rhonchi.   HEART: Regular rate and rhythm. S1, S2, no murmurs.   ABDOMEN: Soft, nontender, nondistended. Bowel sounds positive. No  hepatosplenomegaly.   EXTREMITIES: No clubbing, cyanosis, or edema.   SKIN: No rashes. No purpura.       Admission on 07/18/2023   Component Date Value Ref Range Status    QT Interval 07/18/2023 306  ms Preliminary    QTC Interval 07/18/2023 468  ms Preliminary    Protime 07/18/2023 53.3 (C)  12.2 - 14.5 Seconds Final    INR 07/18/2023 5.91 (C)  0.89 - 1.12 Final    Glucose 07/18/2023 115 (H)  65 - 99 mg/dL Final    BUN 07/18/2023 17  8 - 23 mg/dL Final    Creatinine 07/18/2023 0.98  0.76 - 1.27 mg/dL Final    Sodium 07/18/2023 136  136 - 145 mmol/L Final    Potassium 07/18/2023 4.1  3.5 - 5.2 mmol/L Final    Chloride 07/18/2023 97 (L)  98 - 107 mmol/L Final    CO2 07/18/2023 22.0  22.0 - 29.0 mmol/L Final    Calcium 07/18/2023 10.3  8.6 - 10.5 mg/dL Final    Total Protein 07/18/2023 7.7  6.0 - 8.5 g/dL Final    Albumin 07/18/2023 3.6  3.5 - 5.2 g/dL Final    ALT (SGPT) 07/18/2023 16  1 - 41 U/L Final    AST (SGOT) 07/18/2023 28  1 - 40 U/L Final    Alkaline Phosphatase 07/18/2023 270 (H)  39 - 117 U/L Final    Total Bilirubin 07/18/2023 2.0 (H)  0.0 - 1.2 mg/dL Final    Globulin 07/18/2023 4.1  gm/dL Final    Calculated Result    A/G Ratio 07/18/2023 0.9  g/dL Final    BUN/Creatinine Ratio 07/18/2023 17.3  7.0 - 25.0 Final    Anion Gap 07/18/2023 17.0 (H)  5.0 - 15.0 mmol/L " Final    eGFR 07/18/2023 77.5  >60.0 mL/min/1.73 Final    proBNP 07/18/2023 11,337.0 (H)  0.0 - 1,800.0 pg/mL Final    HS Troponin T 07/18/2023 21 (H)  <15 ng/L Final    Extra Tube 07/18/2023 Hold for add-ons.   Final    Auto resulted.    Extra Tube 07/18/2023 hold for add-on   Final    Auto resulted    Extra Tube 07/18/2023 Hold for add-ons.   Final    Auto resulted.    Extra Tube 07/18/2023 Hold for add-ons.   Final    Auto resulted.    Extra Tube 07/18/2023 Hold for add-ons.   Final    Auto resulted    WBC 07/18/2023 16.55 (H)  3.40 - 10.80 10*3/mm3 Final    RBC 07/18/2023 4.68  4.14 - 5.80 10*6/mm3 Final    Hemoglobin 07/18/2023 13.7  13.0 - 17.7 g/dL Final    Hematocrit 07/18/2023 42.1  37.5 - 51.0 % Final    MCV 07/18/2023 90.0  79.0 - 97.0 fL Final    MCH 07/18/2023 29.3  26.6 - 33.0 pg Final    MCHC 07/18/2023 32.5  31.5 - 35.7 g/dL Final    RDW 07/18/2023 14.0  12.3 - 15.4 % Final    RDW-SD 07/18/2023 45.5  37.0 - 54.0 fl Final    MPV 07/18/2023 9.3  6.0 - 12.0 fL Final    Platelets 07/18/2023 284  140 - 450 10*3/mm3 Final    Neutrophil % 07/18/2023 84.5 (H)  42.7 - 76.0 % Final    Lymphocyte % 07/18/2023 5.3 (L)  19.6 - 45.3 % Final    Monocyte % 07/18/2023 7.3  5.0 - 12.0 % Final    Eosinophil % 07/18/2023 0.1 (L)  0.3 - 6.2 % Final    Basophil % 07/18/2023 0.3  0.0 - 1.5 % Final    Immature Grans % 07/18/2023 2.5 (H)  0.0 - 0.5 % Final    Neutrophils, Absolute 07/18/2023 13.99 (H)  1.70 - 7.00 10*3/mm3 Final    Lymphocytes, Absolute 07/18/2023 0.87  0.70 - 3.10 10*3/mm3 Final    Monocytes, Absolute 07/18/2023 1.21 (H)  0.10 - 0.90 10*3/mm3 Final    Eosinophils, Absolute 07/18/2023 0.01  0.00 - 0.40 10*3/mm3 Final    Basophils, Absolute 07/18/2023 0.05  0.00 - 0.20 10*3/mm3 Final    Immature Grans, Absolute 07/18/2023 0.42 (H)  0.00 - 0.05 10*3/mm3 Final    nRBC 07/18/2023 0.0  0.0 - 0.2 /100 WBC Final    Creatine Kinase 07/18/2023 94  20 - 200 U/L Final    Lactate 07/18/2023 2.6 (C)  0.5 - 2.0 mmol/L  Final    Falsely depressed results may occur on samples drawn from patients receiving N-Acetylcysteine (NAC) or Metamizole.    Procalcitonin 07/18/2023 0.27 (H)  0.00 - 0.25 ng/mL Final    Lactate 07/18/2023 1.4  0.5 - 2.0 mmol/L Final    Falsely depressed results may occur on samples drawn from patients receiving N-Acetylcysteine (NAC) or Metamizole.    Color, UA 07/18/2023 Yellow  Yellow, Straw Final    Appearance, UA 07/18/2023 Clear  Clear Final    pH, UA 07/18/2023 5.5  5.0 - 8.0 Final    Specific Gravity, UA 07/18/2023 1.073 (H)  1.001 - 1.030 Final    Glucose, UA 07/18/2023 Negative  Negative Final    Ketones, UA 07/18/2023 Negative  Negative Final    Bilirubin, UA 07/18/2023 Negative  Negative Final    Blood, UA 07/18/2023 Trace (A)  Negative Final    Protein, UA 07/18/2023 Trace (A)  Negative Final    Leuk Esterase, UA 07/18/2023 Negative  Negative Final    Nitrite, UA 07/18/2023 Negative  Negative Final    Urobilinogen, UA 07/18/2023 0.2 E.U./dL  0.2 - 1.0 E.U./dL Final    Strep Pneumo Ag 07/18/2023 Negative  Negative Final    LEGIONELLA ANTIGEN, URINE 07/18/2023 Negative  Negative Final    MRSA PCR 07/19/2023 Negative  Negative Final    ADENOVIRUS, PCR 07/18/2023 Not Detected  Not Detected Final    Coronavirus 229E 07/18/2023 Not Detected  Not Detected Final    Coronavirus HKU1 07/18/2023 Not Detected  Not Detected Final    Coronavirus NL63 07/18/2023 Not Detected  Not Detected Final    Coronavirus OC43 07/18/2023 Not Detected  Not Detected Final    COVID19 07/18/2023 Not Detected  Not Detected - Ref. Range Final    Human Metapneumovirus 07/18/2023 Not Detected  Not Detected Final    Human Rhinovirus/Enterovirus 07/18/2023 Not Detected  Not Detected Final    Influenza A PCR 07/18/2023 Not Detected  Not Detected Final    Influenza B PCR 07/18/2023 Not Detected  Not Detected Final    Parainfluenza Virus 1 07/18/2023 Not Detected  Not Detected Final    Parainfluenza Virus 2 07/18/2023 Not Detected  Not  Detected Final    Parainfluenza Virus 3 07/18/2023 Not Detected  Not Detected Final    Parainfluenza Virus 4 07/18/2023 Not Detected  Not Detected Final    RSV, PCR 07/18/2023 Not Detected  Not Detected Final    Bordetella pertussis pcr 07/18/2023 Not Detected  Not Detected Final    Bordetella parapertussis PCR 07/18/2023 Not Detected  Not Detected Final    Chlamydophila pneumoniae PCR 07/18/2023 Not Detected  Not Detected Final    Mycoplasma pneumo by PCR 07/18/2023 Not Detected  Not Detected Final    RBC, UA 07/18/2023 0-2  None Seen, 0-2 /HPF Final    WBC, UA 07/18/2023 3-5 (A)  None Seen, 0-2 /HPF Final    Urine culture not indicated.    Bacteria, UA 07/18/2023 None Seen  None Seen, Trace /HPF Final    Squamous Epithelial Cells, UA 07/18/2023 3-6 (A)  None Seen, 0-2 /HPF Final    Hyaline Casts, UA 07/18/2023 0-6  0 - 6 /LPF Final    Methodology 07/18/2023 Automated Microscopy   Final    Creatinine 07/18/2023 0.90  0.60 - 1.30 mg/dL Final    Serial Number: [id number]:  [id number]    WBC 07/19/2023 12.67 (H)  3.40 - 10.80 10*3/mm3 Final    RBC 07/19/2023 3.94 (L)  4.14 - 5.80 10*6/mm3 Final    Hemoglobin 07/19/2023 11.7 (L)  13.0 - 17.7 g/dL Final    Hematocrit 07/19/2023 34.6 (L)  37.5 - 51.0 % Final    MCV 07/19/2023 87.8  79.0 - 97.0 fL Final    MCH 07/19/2023 29.7  26.6 - 33.0 pg Final    MCHC 07/19/2023 33.8  31.5 - 35.7 g/dL Final    RDW 07/19/2023 13.9  12.3 - 15.4 % Final    RDW-SD 07/19/2023 44.1  37.0 - 54.0 fl Final    MPV 07/19/2023 9.9  6.0 - 12.0 fL Final    Platelets 07/19/2023 206  140 - 450 10*3/mm3 Final    Neutrophil % 07/19/2023 83.9 (H)  42.7 - 76.0 % Final    Lymphocyte % 07/19/2023 6.2 (L)  19.6 - 45.3 % Final    Monocyte % 07/19/2023 8.4  5.0 - 12.0 % Final    Eosinophil % 07/19/2023 0.0 (L)  0.3 - 6.2 % Final    Basophil % 07/19/2023 0.2  0.0 - 1.5 % Final    Immature Grans % 07/19/2023 1.3 (H)  0.0 - 0.5 % Final    Neutrophils, Absolute 07/19/2023 10.63 (H)  1.70 - 7.00 10*3/mm3 Final     Lymphocytes, Absolute 07/19/2023 0.78  0.70 - 3.10 10*3/mm3 Final    Monocytes, Absolute 07/19/2023 1.07 (H)  0.10 - 0.90 10*3/mm3 Final    Eosinophils, Absolute 07/19/2023 0.00  0.00 - 0.40 10*3/mm3 Final    Basophils, Absolute 07/19/2023 0.03  0.00 - 0.20 10*3/mm3 Final    Immature Grans, Absolute 07/19/2023 0.16 (H)  0.00 - 0.05 10*3/mm3 Final    nRBC 07/19/2023 0.0  0.0 - 0.2 /100 WBC Final    Glucose 07/19/2023 100 (H)  65 - 99 mg/dL Final    BUN 07/19/2023 15  8 - 23 mg/dL Final    Creatinine 07/19/2023 0.56 (L)  0.76 - 1.27 mg/dL Final    Sodium 07/19/2023 137  136 - 145 mmol/L Final    Potassium 07/19/2023 3.6  3.5 - 5.2 mmol/L Final    Slight hemolysis detected by analyzer. Results may be affected.    Chloride 07/19/2023 102  98 - 107 mmol/L Final    CO2 07/19/2023 18.0 (L)  22.0 - 29.0 mmol/L Final    Calcium 07/19/2023 8.9  8.6 - 10.5 mg/dL Final    Total Protein 07/19/2023 5.7 (L)  6.0 - 8.5 g/dL Final    Albumin 07/19/2023 2.9 (L)  3.5 - 5.2 g/dL Final    ALT (SGPT) 07/19/2023 12  1 - 41 U/L Final    AST (SGOT) 07/19/2023 24  1 - 40 U/L Final    Alkaline Phosphatase 07/19/2023 230 (H)  39 - 117 U/L Final    Total Bilirubin 07/19/2023 1.6 (H)  0.0 - 1.2 mg/dL Final    Globulin 07/19/2023 2.8  gm/dL Final    Calculated Result    A/G Ratio 07/19/2023 1.0  g/dL Final    BUN/Creatinine Ratio 07/19/2023 26.8 (H)  7.0 - 25.0 Final    Anion Gap 07/19/2023 17.0 (H)  5.0 - 15.0 mmol/L Final    eGFR 07/19/2023 99.0  >60.0 mL/min/1.73 Final    Magnesium 07/19/2023 1.8  1.6 - 2.4 mg/dL Final    Protime 07/19/2023 35.3 (H)  12.2 - 14.5 Seconds Final    INR 07/19/2023 3.49 (H)  0.89 - 1.12 Final       No results found.    ASSESSMENT: The patient is a very pleasant 82 y.o. male  with metastatic right lower lobe lung cancer      PLAN:  1.  Metastatic right lower lobe lung cancer:  A.  The patient and his family has decided to proceed with palliative approach with symptoms management only.  I completely agree with his  decision given his advanced stage disease, and poor performance status.  B.  I will sign off please call with questions.    2.  Symptomatic bone metastases:  A.  Patient agreed for palliative radiotherapy.    3.  Postobstructive pneumonia:  A.  Patient is on antibiotics    4.  Cancer related pain:  A.  Recommend hospice consult.    Herlinda Olivier MD  7/19/2023

## 2023-07-19 NOTE — THERAPY EVALUATION
Acute Care - Speech Language Pathology   Swallow Initial Evaluation [facility]  Clinical Swallow Evaluation     Patient Name: Phuc Bowden  : 1940  MRN: [id number]  Today's Date: 2023               Admit Date: 2023    Visit Dx:     ICD-10-CM ICD-9-CM   1. Generalized weakness  R53.1 780.79   2. Right lower lobe lung mass  R91.8 786.6   3. Bronchiolitis  J21.9 466.19   4. Acute respiratory failure with hypoxia  J96.01 518.81   5. Generalized abdominal pain  R10.84 789.07   6. Acute midline low back pain without sciatica  M54.50 724.2   7. Cancer, metastatic to liver  C78.7 197.7   8. 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 Elevated INR  R79.1 790.92   10. Elevated lactic acid level  R79.89 276.2   11. Elevated brain natriuretic peptide (BNP) level  R79.89 790.99   12. Elevated bilirubin  R17 277.4     Patient Active Problem List   Diagnosis    Cancer associated pain    Right lower lobe lung mass    Metastasis    Coronary artery disease with history of myocardial infarction without history of CABG    COPD (chronic obstructive pulmonary disease)    Essential hypertension    Hyperlipidemia    Functional assessment declined    PAF (paroxysmal atrial fibrillation)    Pneumonia of right lung due to infectious organism    Supratherapeutic INR    Lactic acidosis    Leukocytosis    CHF (congestive heart failure)     Past Medical History:   Diagnosis Date    Anemia     Arthritis     Atrial fibrillation     Bowel obstruction     Bradycardia     CHF (congestive heart failure)     COPD (chronic obstructive pulmonary disease)     Coronary artery disease     GERD (gastroesophageal reflux disease)     Hearing loss     Hyperlipidemia     Injury of back     Myocardial infarction     Sciatica      Past Surgical History:   Procedure Laterality Date    CARDIAC SURGERY          CORONARY ARTERY BYPASS GRAFT      EYE SURGERY      HEMORRHOIDECTOMY          SLP Recommendation and Plan  SLP Swallowing Diagnosis: functional oral phase, swallow WFL/no suspected pharyngeal impairment (07/19/23 0830)  SLP Diet Recommendation: regular textures, thin liquids (07/19/23 0830)  Recommended Precautions and Strategies: upright posture during/after eating (07/19/23 0830)  SLP Rec. for Method of Medication Administration: meds whole, with thin liquids, with puree, as tolerated (07/19/23 0830)           Swallow Criteria for Skilled Therapeutic Interventions Met: no problems identified which require skilled intervention (07/19/23 0830)  Anticipated Discharge Disposition (SLP): home (07/19/23 0830)  Rehab Potential/Prognosis, Swallowing: good, to achieve stated therapy goals (07/19/23 0830)  Therapy Frequency (Swallow): evaluation only (07/19/23 0830)     Oral Care Recommendations: Oral Care BID/PRN, Swab (07/19/23 0830)                                      Oral Care Recommendations: Oral Care BID/PRN, Swab (07/19/23 0830)    Plan of Care Reviewed With: patient  Progress: no change (eval)      SWALLOW EVALUATION (last 72 hours)       SLP Adult Swallow Evaluation       Row Name 07/19/23 0830                   Rehab Evaluation    Document Type evaluation  -EN        Subjective Information no complaints  -EN        Patient Observations alert;cooperative;agree to therapy  -EN        Patient/Family/Caregiver Comments/Observations son present  -EN        Patient Effort good  -EN        Symptoms Noted During/After Treatment none  -EN           General Information    Patient Profile Reviewed yes  -EN        Pertinent History Of Current Problem Pt admitted w/ progressive back pain. Hx of CAD s/p MI and CABG, PAF on warfarin, skin cancer, HTN, HLD, hearing loss, previous tobacco use long term with COPD, GERD. Recently dx'd RLL lung cancer w/ metastatic liver/spine involvement. Per chart, goal of care is quality of life.  -EN        Current Method of Nutrition NPO  -EN         Precautions/Limitations, Vision WFL;for purposes of eval  -EN        Precautions/Limitations, Hearing hearing aid, bilaterally  -EN        Prior Level of Function-Communication WFL  -EN        Prior Level of Function-Swallowing no diet consistency restrictions  -EN        Plans/Goals Discussed with patient and family;agreed upon  -EN        Barriers to Rehab none identified  -EN        Patient's Goals for Discharge return home  -EN        Family Goals for Discharge family did not state  -EN           Pain    Additional Documentation Pain Scale: FACES Pre/Post-Treatment (Group)  -EN           Pain Scale: FACES Pre/Post-Treatment    Pain: FACES Scale, Pretreatment 0-->no hurt  -EN        Posttreatment Pain Rating 0-->no hurt  -EN           Oral Motor Structure and Function    Dentition Assessment natural, present and adequate  -EN        Secretion Management WNL/WFL;other (see comments)  pt wiped roof of mouth and bloody tissue on napkin  -EN        Mucosal Quality ulcerated  -EN        Volitional Swallow WFL  -EN           Oral Musculature and Cranial Nerve Assessment    Oral Motor General Assessment WFL  -EN           General Eating/Swallowing Observations    Respiratory Support Currently in Use room air  -EN        Eating/Swallowing Skills self-fed  -EN        Positioning During Eating upright 90 degree;upright in bed  -EN        Utensils Used spoon;cup;straw  -EN        Consistencies Trialed regular textures;pureed;thin liquids  -EN           Clinical Swallow Eval    Oral Prep Phase WFL  -EN        Oral Transit WFL  -EN        Oral Residue WFL  -EN        Pharyngeal Phase no overt signs/symptoms of pharyngeal impairment  -EN        Esophageal Phase unremarkable  -EN           SLP Evaluation Clinical Impression    SLP Swallowing Diagnosis functional oral phase;swallow WFL/no suspected pharyngeal impairment  -EN        Functional Impact no impact on function  -EN        Rehab Potential/Prognosis, Swallowing good, to  achieve stated therapy goals  -EN        Swallow Criteria for Skilled Therapeutic Interventions Met no problems identified which require skilled intervention  -EN           Recommendations    Therapy Frequency (Swallow) evaluation only  -EN        SLP Diet Recommendation regular textures;thin liquids  -EN        Recommended Precautions and Strategies upright posture during/after eating  -EN        Oral Care Recommendations Oral Care BID/PRN;Swab  -EN        SLP Rec. for Method of Medication Administration meds whole;with thin liquids;with puree;as tolerated  -EN        Anticipated Discharge Disposition (SLP) home  -EN                  User Key  (r) = Recorded By, (t) = Taken By, (c) = Cosigned By      Initials Name Effective Dates    EN Eleanor Landaverde MS CCC-SLP 06/22/22 -                     EDUCATION  The patient has been educated in the following areas:   Dysphagia (Swallowing Impairment).              Time Calculation:    Time Calculation- SLP       Row Name 07/19/23 0927             Time Calculation- SLP    SLP Start Time 0830  -EN      SLP Received On 07/19/23  -EN         Untimed Charges    SLP Eval/Re-eval  ST Eval Oral Pharyng Swallow - [id number]  -EN      [id number] Eval Oral Pharyng Swallow Minutes 45  -EN         Total Minutes    Untimed Charges Total Minutes 45  -EN       Total Minutes 45  -EN                User Key  (r) = Recorded By, (t) = Taken By, (c) = Cosigned By      Initials Name Provider Type    EN Eleanor Landaverde MS CCC-SLP Speech and Language Pathologist                    Therapy Charges for Today       Code Description Service Date Service Provider Modifiers Qty    [id number] HC ST EVAL ORAL PHARYNG SWALLOW 3 7/19/2023 Eleanor Landaverde MS CCC-SLP GN 1                 MS LG Addison  7/19/2023

## 2023-07-19 NOTE — THERAPY DISCHARGE NOTE
DATE OF COMPLETION: 7/19/2023  DIAGNOSIS: Presumed metastatic lung cancer    REFERRING: Herlinda Olivier MD        Phuc Jalloh completed radiation therapy today.      BACKGROUND: Phuc Bowden presented with progressive back pain and was found to have a compression fracture at L3 consistent with metastatic lung cancer.  He completed treatment to L2-3 and the left hip as detailed below:    Treatment Summary     Dates of Therapy: 7/19/2023  Treatment Site: L2-3  Dose: 8 Gy in a single fraction   Technique: AP/PA photons using 15 MV energy    Dates of Therapy: 7/19/2023  Treatment Site: Left femoral neck and ischium  Dose: 8 Gy in a single fraction  Technique: AP/PA photons using 15 MV energy    Treatment Course and Tolerance: He tolerated radiation well without any acute toxicities    The initial follow up visit will be in 1 month.    Phuc Bowden knows to call if any problems or concerns develop in the meantime.     Electronically signed by: Rubio Kowalski MD                    Cc: Blayne Adan MD

## 2023-07-19 NOTE — CONSULTS
CONSULTATION NOTE    NAME:      Phuc Bowden  :                                                          1940  DATE OF CONSULTATION:                       2023   REQUESTING PHYSICIAN:                   No ref. provider found  REASON FOR CONSULTATION:             1. 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 Right lower lobe lung mass    3. Bronchiolitis    4. Acute respiratory failure with hypoxia    5. Generalized abdominal pain    6. Acute midline low back pain without sciatica    7. Cancer, metastatic to liver    8. 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 Elevated INR    10. Elevated lactic acid level    11. Elevated brain natriuretic peptide (BNP) level    12. Elevated bilirubin       Thank you for requesting my services in evaluation of this pleasant individual.  I am seeing them in inpatient consultation regarding a diagnosis of presumed metastatic lung cancer with metastases to the spine and pathologic compression fracture of L3.     BRIEF HISTORY:  Phcu Bowden  is a very pleasant 82 y.o. male with multiple medical comorbidities including COPD, tobacco use, and coronary artery disease, who presented 1 month ago after sustaining a fall from a ladder where it was presumed he tore a rotator cuff in the right shoulder.  He completed imaging at the time showing a large mass in the right lower lobe of the lung, bilateral pleural effusions, hyperdense lesions in the liver, and multiple bony lesions compatible with metastases from a presumed right lower lobe lung cancer.  He has had a long workup with some delays and as of yet, he still has not had any tissue obtained.  He was admitted to [facility] through the ED yesterday due to progressive low back pain and difficulty ambulating.  New imaging was obtained showing progressive disease in the lumbar spine, most notably at L2-3 with compression fracture of L3.  Most of the history is obtained today  through his son.  He reports tremendous difficulty with ambulation due to pain, noted to be in the low back and left hip.  They deny any CNS complaints of headaches, nausea, or other focal issues.  He has not had dedicated CNS imaging.      Allergies   Allergen Reactions    Bee Venom Anaphylaxis       Social History     Tobacco Use    Smoking status: Former     Packs/day: 2.00     Years: 15.00     Pack years: 30.00     Types: Cigarettes    Smokeless tobacco: Never   Vaping Use    Vaping Use: Never used   Substance Use Topics    Alcohol use: Never    Drug use: Never       Past Medical History:   Diagnosis Date    Anemia     Arthritis     Atrial fibrillation     Bowel obstruction     Bradycardia     CHF (congestive heart failure)     COPD (chronic obstructive pulmonary disease)     Coronary artery disease     GERD (gastroesophageal reflux disease)     Hearing loss     Hyperlipidemia     Injury of back     Myocardial infarction     Sciatica        family history includes Lung cancer in his brother and sister.     Past Surgical History:   Procedure Laterality Date    CARDIAC SURGERY      2006    CORONARY ARTERY BYPASS GRAFT      EYE SURGERY      HEMORRHOIDECTOMY          Review of Systems   Constitutional:  Positive for appetite change, fatigue and unexpected weight change.   HENT:  Negative.     Eyes: Negative.    Respiratory:  Positive for cough and shortness of breath. Negative for hemoptysis.    Cardiovascular: Negative.    Gastrointestinal: Negative.    Endocrine: Negative.    Genitourinary: Negative.     Musculoskeletal:  Positive for back pain.   Skin: Negative.    Neurological:  Positive for extremity weakness.   Hematological:  Bruises/bleeds easily.   Psychiatric/Behavioral: Negative.           Objective   VITAL SIGNS:   Vitals:    07/19/23 0402 07/19/23 0600 07/19/23 0742 07/19/23 0958   BP:   107/69 111/75   BP Location:   Left arm    Patient Position:   Lying    Pulse: 114 114  114   Resp:   18    Temp:    98.1 °F (36.7 °C)    TempSrc:   Oral    SpO2: 96% 96%     Weight:       Height:                      KPS 60    Physical Exam  Vitals and nursing note reviewed.   Constitutional:       General: He is not in acute distress.     Appearance: He is well-developed.      Comments: Resting in hospital bed.  No distress.  With position changes, he winces in pain.  Reports discomfort coming from his back and left hip.   HENT:      Head: Normocephalic and atraumatic.   Eyes:      Conjunctiva/sclera: Conjunctivae normal.      Pupils: Pupils are equal, round, and reactive to light.   Cardiovascular:      Rate and Rhythm: Normal rate and regular rhythm.      Heart sounds: No murmur heard.    No friction rub.   Pulmonary:      Effort: Pulmonary effort is normal.      Breath sounds: Normal breath sounds. No wheezing.      Comments: Coarse breath sounds predominantly in the right base  Abdominal:      General: Bowel sounds are normal. There is no distension.      Palpations: Abdomen is soft. There is no mass.      Tenderness: There is no abdominal tenderness.   Musculoskeletal:         General: Normal range of motion.      Cervical back: Normal range of motion and neck supple.      Comments: Pain described in the lumbar spine and left lateral hip   Lymphadenopathy:      Cervical: No cervical adenopathy.   Skin:     General: Skin is warm and dry.   Neurological:      General: No focal deficit present.      Mental Status: He is alert and oriented to person, place, and time.      Comments: Restricts movement of the left leg, which appears to be due to pain   Psychiatric:         Behavior: Behavior normal.         Thought Content: Thought content normal.         Judgment: Judgment normal.     IMAGING  I have personally reviewed the relevant imaging studies, as follows:  CT Abdomen Pelvis With Contrast    Result Date: 7/18/2023  CT ABDOMEN PELVIS W CONTRAST Date of Exam: 7/18/2023 1:08 PM EDT Indication: Low back pain with reported  "lesion to the bar spine at outside 20.  Weight loss and poor appetite.  Admitted 1 month ago side hospital for \"abdominal infection \". Comparison: June 7, 2023 Technique: Axial CT images were obtained of the abdomen and pelvis following the uneventful intravenous administration of 85 mL Isovue-370. Reconstructed coronal and sagittal images were also obtained. Automated exposure control and iterative construction methods were used. Findings: Hypodense lesions within the liver appear increased in size and number. An index lesion within the left hepatic lobe on image 33 measures 3.6 x 3.1 cm, previously 1.1 x 1.0 cm. The gallbladder, adrenal glands, right kidney, spleen, and pancreas are unremarkable. There are small left renal cyst. The stomach appears normal. The small bowel appears normal in caliber and configuration. The colon appears normal. The appendix appears normal. There is no ascites or loculated collection. No abnormally enlarged lymph nodes are identified. The rectum and urinary bladder are unremarkable. The prostate is enlarged measuring 4.8 cm in transverse dimension. Several osseous metastases have increased in size, for example an index lesion involving the right L3 vertebral body measuring 4.9 cm, previously 3.2 cm. Another metastasis is seen within the left ischium.     Impression: 1.Progressive hypodense lesions within liver and progressive osseous metastasis, compatible with disease progression. 2.No acute process identified within abdomen/pelvis. 3.Enlarged prostate. Electronically Signed: Ruddy Rodriguez  7/18/2023 2:17 PM EDT  Workstation ID: [id number]    CT Angiogram Chest    Result Date: 7/18/2023  CT ANGIOGRAM CHEST Date of Exam: 7/18/2023 1:08 PM EDT Indication: Per family, oxygen level 80% this morning.  Weight loss over the past 1 month.  Possible malignancy.. Comparison: None available. Technique: Axial pulmonary arterial phase IV contrast enhanced CT angiogram of the chest per PE " protocol. Two-dimensional reconstructions were postprocessed. Axial IV contrast-enhanced CT of the abdomen and pelvis with multiplanar reconstruction. Findings: CTA chest: There is no pathologic axillary adenopathy or other worrisome body wall soft tissue finding in the chest. Trace pleural effusion is present on the right. There is no pericardial effusion. Atherosclerotic, nonaneurysmal thoracic aorta. The pulmonary arteries are patent and well opacified, without evidence of filling defect concerning for acute pulmonary embolus. There is no distinct pathologic mediastinal adenopathy. Evaluation of the osseous structures demonstrates no evidence of acute fracture or aggressive osseous lesion. Evaluation of the lung fields demonstrates fairly extensive confluent consolidation in the right lower lobe, with some evidence of endobronchial obstruction, with underlying mass not entirely excluded. The remaining  lung fields demonstrate moderate emphysema and mild diffuse peribronchial groundglass opacity, suggesting component of bronchiolitis.     Impression: Prominent consolidation is noted in the right lower lobe, somewhat masslike with evidence of endobronchial obstruction, concerning for underlying possible primary or metastatic neoplasm. There is otherwise no suspicious thoracic adenopathy. Diffuse areas of centrilobular groundglass opacity are present, suggesting component of bronchiolitis. No additional acute findings are present. There is no acute pulmonary embolus. Electronically Signed: dEer Mireles  7/18/2023 2:06 PM EDT  Workstation ID: [id number]    MRI Lumbar Spine With & Without Contrast    Result Date: 7/18/2023  MRI LUMBAR SPINE W WO CONTRAST Date of Exam: 7/18/2023 2:27 PM EDT Indication: Low back pain.  Bilateral lower extremity weakness and decreased sensation.  Reported lesion in the lumbar spine from outside hospital..  Comparison: CT abdomen pelvis from June 7, 2023 Technique:  Routine  multiplanar/multisequence sequence images of the lumbar spine were obtained before and after the uneventful administration of 14 mL Multihance.  Findings: The alignment is anatomic. The vertebral body heights appear normal. There are multiple T1 hypointense enhancing lesions most prominent within the L3 vertebral body most compatible with metastasis. There is an associated mild compression deformity at L3,  likely a pathological fracture. No epidural spread of tumor is identified. There are degenerative endplate changes at L3-4 and L4-5. There is disc desiccation at all levels, with moderate degenerative loss of disc height at L4-5. The conus terminates at  the T12-L1 level. The posterior paravertebral soft tissues are unremarkable. L1-2: Mild disc bulge. Mild bilateral facet arthropathy. No spinal canal stenosis. No neural foraminal stenosis. L2-3: Mild disc bulge eccentric to the left. Mild right and moderate facet arthropathy with ligamentum flavum infolding. Mild spinal canal stenosis. Mild right and mild to moderate left neural foraminal stenosis. L3-4: Mild disc bulge. Moderate bilateral facet arthropathy with ligamentum flavum infolding. Mild to moderate spinal canal stenosis. Mild to moderate right and moderate left neural foramen stenosis. L4-5: Moderate disc bulge. Moderate bilateral facet arthropathy with ligamentum flavum infolding. Mild spinal canal stenosis. Moderate right and mild to moderate left neural foraminal stenosis. L5-S1: Mild disc bulge eccentric to the right narrowing the right lateral recess. Moderate right and mild left facet arthropathy. No spinal canal stenosis. Moderate right and mild left neural foraminal stenosis.     Impression: 1.Multiple enhancing lesions within lumbar spine most prominent at L3, most compatible with osseous metastasis. There is an associated mild compression deformity at L3, likely a pathological fracture. 2.Mild to moderate multilevel degenerative changes of  lumbar spine as described above. Electronically Signed: Ruddy Rodriguez  7/18/2023 3:32 PM EDT  Workstation ID: [id number]          The following portions of the patient's history were reviewed and updated as appropriate: allergies, current medications, past family history, past medical history, past social history, past surgical history, and problem list.    Assessment      IMPRESSION:  Mr. Bowden is an 82 year old gentleman with multiple medical comorbidities, who presents now with intractable pain due to metastatic disease to L2-3 causing compression fraction.  He does not appear to have any clear evidence of spinal cord impingement, but with the location of the lesion and resulting injury, I believe the metastasis in the L-spine is primary source of his pain, as well as the left ischium lesion which is likely contributing to his discomfort when he tries to ambulate.    The patient and his family have been discussing goals of care, and at this point, they have declined obtaining a biopsy and are electing to focus on palliative care.  Without a tissue diagnosis, his scans really point to a clinical diagnosis of lung cancer.  I think radiation will be helpful to alleviate his pain, and what I recommend is 8 Gy in a single fraction to L2-3 and also the left ischium.  They would like to discuss more amongst the family before making their decision.  I reiterated that the goal would be to reduce his pain and hopefully improve his quality of life.  Radiation to these 2 areas would have very little risk, but would also not treat the other areas of disease in the lungs, liver, etc.      Once they make their decision, and it they would like to pursue radiation treatments, then I can coordinate for him to undergo a radiation setup and simulation session and we will aim to have both of these areas treated in a single fraction.    RECOMMENDATIONS:  Recommend palliative radiation to L2-3 and the left ischium - 8 Gy in a single  treatment.         Rubio Kowalski MD

## 2023-07-19 NOTE — DISCHARGE PLACEMENT REQUEST
"Phuc Acuna (82 y.o. Male)   Referred by Jazmine DAIGLE  PCP- Dr. Blayne Adan  Dx- Persumed Metastatic lung cancer-pt declined biopsy  Address: [address] , [place] [id number]      Date of Birth   1940    Social Security Number       Address   [address]  [facility] [address]    Home Phone   [phone]    MRN   [id number]       Islam   None    Marital Status                               Admission Date   7/18/23    Admission Type   Emergency    Admitting Provider   Maria E Silva DO    Attending Provider   Maria E Silva DO    Department, Room/Bed   [facility] 6B, N645/1       Discharge Date       Discharge Disposition       Discharge Destination                                 Attending Provider: Maria E Silva DO    Allergies: Bee Venom    Isolation: None   Infection: None   Code Status: No CPR    Ht: 177.8 cm (70\")   Wt: 69.9 kg (154 lb)    Admission Cmt: None   Principal Problem: 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]                   Active Insurance as of 7/18/2023       Primary Coverage       Payor Plan Insurance Group Employer/Plan Group    ANTHEM MEDICARE REPLACEMENT ANTHEM MEDICARE ADVANTAGE [id number]       Payor Plan Address Payor Plan Phone Number Payor Plan Fax Number Effective Dates    [address] [phone]  2/1/2022 - None Entered    [facility] [address]         Subscriber Name Subscriber Birth Date Member ID       PHUC ACUNA 1940 [id number]                     Emergency Contacts        (Rel.) Home Phone Work Phone Mobile Phone    Michelle Acuna (Spouse) [phone] -- --    aJnethShellie (Daughter) [phone] -- --              Emergency Contact Information       Name Relation Home Work Mobile    Michelle Acuna Spouse [phone]      JanethShellie Daughter [phone]            Insurance Information                  ANTHEM MEDICARE REPLACEMENT/ANTHEM MEDICARE ADVANTAGE Phone: [phone]    Subscriber: [place] Phuc " Subscriber#: [id number]    Group#: [id number] Precert#: --             History & Physical        Vy Alston MD at 23 [facility]              [facility] Medicine Services  HISTORY AND PHYSICAL    Patient Name: Phuc Bowden  : 1940  MRN: [id number]  Primary Care Physician: Blayne Adan MD  Date of admission: 2023    Subjective  Subjective     Chief Complaint:  Intractable back pain    HPI:  Phuc Bowden is a 82 y.o. male with pmh significant for CAD s/p MI and CABG, PAF on warfarin, skin cancer, HTN, HLD, hearing loss, previous tobacco use long term with COPD, GERD, presents with worsening low back pain x 2 months. Patient seen OSH couple of weeks ago for pain, falls and weakness. He was treated for nonspecific abdominal infection and during work up found right lung mass with probable mets to liver/ spine. He was to have follow up for biopsy with  intermediate which was not performed due to warfarin use. He cancelled follow up due to severe back pain and significant decline in mobility. Patient reports within a weeks time has gone from cane to walker and now unable to stand without heavy assist. He has also lost 15 lbs in past month due to loss of appetite.   CT chest and abd do confirm RLL consolidation and probable mass, hypodense lesions in the liver and osseous mets.        Review of Systems   Constitutional:  Positive for activity change, appetite change and unexpected weight change. Negative for fever.   HENT: Negative.     Respiratory: Negative.     Cardiovascular: Negative.    Gastrointestinal:  Positive for abdominal distention.   Genitourinary: Negative.    Musculoskeletal:  Positive for back pain and gait problem.   Skin: Negative.    Neurological:  Positive for weakness.   Hematological:  Bruises/bleeds easily.   Psychiatric/Behavioral: Negative.            Personal History     Past Medical History:   Diagnosis Date    Anemia     Arthritis     Atrial fibrillation      Bowel obstruction     Bradycardia     CHF (congestive heart failure)     COPD (chronic obstructive pulmonary disease)     Coronary artery disease     GERD (gastroesophageal reflux disease)     Hearing loss     Hyperlipidemia     Injury of back     Myocardial infarction     Sciatica          Oncology Problem List:  Metastasis (07/18/2023; Status: Active)       Past Surgical History:   Procedure Laterality Date    CARDIAC SURGERY      2006    CORONARY ARTERY BYPASS GRAFT      EYE SURGERY      HEMORRHOIDECTOMY         Family History:  family history includes Lung cancer in his brother and sister.     Social History:    Social History     Social History Narrative    Not on file       Medications:  dilTIAZem CD, fish oil, folic acid, losartan, metoprolol tartrate, nitroglycerin, pravastatin, raNITIdine, traMADol, and warfarin    Allergies   Allergen Reactions    Bee Venom Anaphylaxis       Objective  Objective     Vital Signs:   Temp:  [97.5 °F (36.4 °C)] 97.5 °F (36.4 °C)  Heart Rate:  [72-89] 72  Resp:  [16] 16  BP: (121-147)/(69-98) 121/69    Physical Exam   Constitutional: Awake, alert, frail, very [place]  Eyes: PERRLA, sclerae anicteric, no conjunctival injection  HENT: NCAT, mucous membranes moist  Neck: Supple, no thyromegaly, trachea midline  Respiratory: diminished throughout, nonlabored respirations   Cardiovascular: RRR, no murmurs  Gastrointestinal: Positive bowel sounds, soft, nontender, nondistended  Musculoskeletal: No bilateral ankle edema, no clubbing or cyanosis to extremities  Psychiatric: Appropriate affect, cooperative  Neurologic: Oriented x 3, general weakness, symmetric,  speech clear  Skin: No rashes      Result Review:  I have personally reviewed the results from the time of this admission to 7/18/2023 16:23 EDT and agree with these findings:  [x]  Laboratory list / accordion  []  Microbiology  [x]  Radiology  [x]  EKG/Telemetry   []  Cardiology/Vascular   []  Pathology  []  Old records  []   "Other:  Most notable findings include:     LAB RESULTS:      Lab 07/18/23  1534 07/18/23  1331 07/18/23  1251   WBC  --   --  16.55*   HEMOGLOBIN  --   --  13.7   HEMATOCRIT  --   --  42.1   PLATELETS  --   --  284   NEUTROS ABS  --   --  13.99*   IMMATURE GRANS (ABS)  --   --  0.42*   LYMPHS ABS  --   --  0.87   MONOS ABS  --   --  1.21*   EOS ABS  --   --  0.01   MCV  --   --  90.0   PROCALCITONIN  --  0.27*  --    LACTATE 1.4  --  2.6*   PROTIME  --   --  53.3*         Lab 07/18/23  1331 07/18/23  1253   SODIUM 136  --    POTASSIUM 4.1  --    CHLORIDE 97*  --    CO2 22.0  --    ANION GAP 17.0*  --    BUN 17  --    CREATININE 0.98 0.90   EGFR 77.5  --    GLUCOSE 115*  --    CALCIUM 10.3  --          Lab 07/18/23  1331   TOTAL PROTEIN 7.7   ALBUMIN 3.6   GLOBULIN 4.1   ALT (SGPT) 16   AST (SGOT) 28   BILIRUBIN 2.0*   ALK PHOS 270*         Lab 07/18/23  1331 07/18/23  1251   PROBNP 11,337.0*  --    HSTROP T 21*  --    PROTIME  --  53.3*   INR  --  5.91*                 Brief Urine Lab Results  (Last result in the past 365 days)        Color   Clarity   Blood   Leuk Est   Nitrite   Protein   CREAT   Urine HCG        07/18/23 1548 Yellow   Clear   Trace   Negative   Negative   Trace                 Microbiology Results (last 10 days)       ** No results found for the last 240 hours. **            CT Abdomen Pelvis With Contrast    Result Date: 7/18/2023  CT ABDOMEN PELVIS W CONTRAST Date of Exam: 7/18/2023 1:08 PM EDT Indication: Low back pain with reported lesion to the bar spine at outside 20.  Weight loss and poor appetite.  Admitted 1 month ago side hospital for \"abdominal infection \". Comparison: June 7, 2023 Technique: Axial CT images were obtained of the abdomen and pelvis following the uneventful intravenous administration of 85 mL Isovue-370. Reconstructed coronal and sagittal images were also obtained. Automated exposure control and iterative construction methods were used. Findings: Hypodense lesions within " the liver appear increased in size and number. An index lesion within the left hepatic lobe on image 33 measures 3.6 x 3.1 cm, previously 1.1 x 1.0 cm. The gallbladder, adrenal glands, right kidney, spleen, and pancreas are unremarkable. There are small left renal cyst. The stomach appears normal. The small bowel appears normal in caliber and configuration. The colon appears normal. The appendix appears normal. There is no ascites or loculated collection. No abnormally enlarged lymph nodes are identified. The rectum and urinary bladder are unremarkable. The prostate is enlarged measuring 4.8 cm in transverse dimension. Several osseous metastases have increased in size, for example an index lesion involving the right L3 vertebral body measuring 4.9 cm, previously 3.2 cm. Another metastasis is seen within the left ischium.     Impression: Impression: 1.Progressive hypodense lesions within liver and progressive osseous metastasis, compatible with disease progression. 2.No acute process identified within abdomen/pelvis. 3.Enlarged prostate. Electronically Signed: Ruddy Rodriguez  7/18/2023 2:17 PM EDT  Workstation ID: [id number]    CT Angiogram Chest    Result Date: 7/18/2023  CT ANGIOGRAM CHEST Date of Exam: 7/18/2023 1:08 PM EDT Indication: Per family, oxygen level 80% this morning.  Weight loss over the past 1 month.  Possible malignancy.. Comparison: None available. Technique: Axial pulmonary arterial phase IV contrast enhanced CT angiogram of the chest per PE protocol. Two-dimensional reconstructions were postprocessed. Axial IV contrast-enhanced CT of the abdomen and pelvis with multiplanar reconstruction. Findings: CTA chest: There is no pathologic axillary adenopathy or other worrisome body wall soft tissue finding in the chest. Trace pleural effusion is present on the right. There is no pericardial effusion. Atherosclerotic, nonaneurysmal thoracic aorta. The pulmonary arteries are patent and well opacified,  without evidence of filling defect concerning for acute pulmonary embolus. There is no distinct pathologic mediastinal adenopathy. Evaluation of the osseous structures demonstrates no evidence of acute fracture or aggressive osseous lesion. Evaluation of the lung fields demonstrates fairly extensive confluent consolidation in the right lower lobe, with some evidence of endobronchial obstruction, with underlying mass not entirely excluded. The remaining  lung fields demonstrate moderate emphysema and mild diffuse peribronchial groundglass opacity, suggesting component of bronchiolitis.     Impression: Impression: Prominent consolidation is noted in the right lower lobe, somewhat masslike with evidence of endobronchial obstruction, concerning for underlying possible primary or metastatic neoplasm. There is otherwise no suspicious thoracic adenopathy. Diffuse areas of centrilobular groundglass opacity are present, suggesting component of bronchiolitis. No additional acute findings are present. There is no acute pulmonary embolus. Electronically Signed: Eder Mireles  7/18/2023 2:06 PM EDT  Workstation ID: [id number]    MRI Lumbar Spine With & Without Contrast    Result Date: 7/18/2023  MRI LUMBAR SPINE W WO CONTRAST Date of Exam: 7/18/2023 2:27 PM EDT Indication: Low back pain.  Bilateral lower extremity weakness and decreased sensation.  Reported lesion in the lumbar spine from outside hospital..  Comparison: CT abdomen pelvis from June 7, 2023 Technique:  Routine multiplanar/multisequence sequence images of the lumbar spine were obtained before and after the uneventful administration of 14 mL Multihance.  Findings: The alignment is anatomic. The vertebral body heights appear normal. There are multiple T1 hypointense enhancing lesions most prominent within the L3 vertebral body most compatible with metastasis. There is an associated mild compression deformity at L3,  likely a pathological fracture. No epidural spread of  tumor is identified. There are degenerative endplate changes at L3-4 and L4-5. There is disc desiccation at all levels, with moderate degenerative loss of disc height at L4-5. The conus terminates at  the T12-L1 level. The posterior paravertebral soft tissues are unremarkable. L1-2: Mild disc bulge. Mild bilateral facet arthropathy. No spinal canal stenosis. No neural foraminal stenosis. L2-3: Mild disc bulge eccentric to the left. Mild right and moderate facet arthropathy with ligamentum flavum infolding. Mild spinal canal stenosis. Mild right and mild to moderate left neural foraminal stenosis. L3-4: Mild disc bulge. Moderate bilateral facet arthropathy with ligamentum flavum infolding. Mild to moderate spinal canal stenosis. Mild to moderate right and moderate left neural foramen stenosis. L4-5: Moderate disc bulge. Moderate bilateral facet arthropathy with ligamentum flavum infolding. Mild spinal canal stenosis. Moderate right and mild to moderate left neural foraminal stenosis. L5-S1: Mild disc bulge eccentric to the right narrowing the right lateral recess. Moderate right and mild left facet arthropathy. No spinal canal stenosis. Moderate right and mild left neural foraminal stenosis.     Impression: Impression: 1.Multiple enhancing lesions within lumbar spine most prominent at L3, most compatible with osseous metastasis. There is an associated mild compression deformity at L3, likely a pathological fracture. 2.Mild to moderate multilevel degenerative changes of lumbar spine as described above. Electronically Signed: Ruddy Rodriguez  7/18/2023 3:32 PM EDT  Workstation ID: [id number]         Assessment & Plan  Assessment & Plan       Cancer associated pain    Right lower lobe lung mass    Metastasis    Coronary artery disease with history of myocardial infarction without history of CABG    COPD (chronic obstructive pulmonary disease)    Essential hypertension    Hyperlipidemia    Functional assessment declined     PAF (paroxysmal atrial fibrillation)    Pneumonia of right lung due to infectious organism    Supratherapeutic INR    Lactic acidosis    Leukocytosis    CHF (congestive heart failure)      Intractable Back Pain with functional decline/ FTT  Right lung mass with liver/ spinal mets  -- recent discovery of lung mass in June upon OSH ED with pending work up through [facility], however pain and debility increasing  -- Radiation oncology consult for AM   -- Hem/Onc consulted  -- NPO after MN for possible biopsy with IR (pending INR in AM)  -- start oxycodone and morphine prn pain (failed tramadol and norco 10/325 outpatient). Palliative consult in am for ongoing assistance upon dc  -- PT/OT/ nutrition assessment    Right sided pneumonia, post obstructive  Leukocytosis, lactic acidosis  -- zosyn and vanc started in ED. Continue zosyn. Check MRSA PCR  -- respiratory panel pending  -- blood cx pending  -- S pneumo and Legionella Antigen pending  -- repeat lactic pending    PAF  Supratherapeutic INR  -- hold warfarin, pharmacy consulted  -- vitamin K for INR 5.9; no evidence of bleeding; will need biopsy while inpatient  -- continue metoprolol and cardizem    HTN  HLD  CAD s/p CABG  CHF  -- continue losartan, metoprolol, pravastatin    DVT prophylaxis:  warfarin held    CODE STATUS:    Medical Intervention Limits: NO intubation (DNI); NO cardioversion  Code Status (Patient has no pulse and is not breathing): No CPR (Do Not Attempt to Resuscitate)  Medical Interventions (Patient has pulse or is breathing): Limited Support      Expected Discharge  Expected Discharge Date: 7/25/2023; Expected Discharge Time:       This note has been completed as part of a split-shared workflow.     Electronically signed by PILO Renae, 07/18/23, 4:12 PM EDT.          Attending   Admission Attestation       I have performed an independent face-to-face diagnostic evaluation including performing an independent physical examination as  documented here.  The documented plan of care above was reviewed and developed with the advanced practice clinician (APC).      Brief Summary Statement:   Phuc Bowden is a 82 y.o. male with prior history of tobacco use disorder, CAD status post CABG (2006), A-fib on warfarin, CHF (data deficit), HTN, who presented for evaluation of pain in his back.  Of note, patient had a visit to the ER after falling from a ladder and imaging showed bilateral pleural effusions, right lower lobe consolidation versus mass, liver lesions, spinal lesions.  He was seen at [facility] on 6/15 (Dr. Farrar) for this and interventional pulmonary was planning for bronchoscopy, possible biopsy and thoracentesis....INR 5.9 on admisison w/o evidence of bleeding.  Patient endorsed generalized back pain, bilateral leg pain, cough with streaks of blood in his sputum, weight loss, generalized weakness, malaise.  His daughter was present at bedside.    Remainder of detailed HPI is as noted by APC and has been reviewed and/or edited by me for completeness.    Attending Physical Exam:  Temp:  [97.5 °F (36.4 °C)] 97.5 °F (36.4 °C)  Heart Rate:  [] 105  Resp:  [16] 16  BP: (121-147)/(69-98) 121/81    Constitutional: Awake, alert  Eyes: PERRLA, sclerae anicteric, no conjunctival injection  HENT: NCAT, mucous membranes moist  Neck: Supple, no thyromegaly, no lymphadenopathy, trachea midline  Respiratory: Right lower lobe with crackles, diminished, nonlabored respirations   Cardiovascular: RRR, no murmurs, rubs, or gallops, palpable pedal pulses bilaterally  Gastrointestinal: Positive bowel sounds, soft, nontender, nondistended  Musculoskeletal: No bilateral ankle edema, no clubbing or cyanosis to extremities  Psychiatric: Appropriate affect, cooperative  Neurologic: Oriented x 3, strength symmetric in all extremities, Cranial Nerves grossly intact to confrontation, speech clear, very hard of hearing  Skin: No rashes      Brief  Assessment/Plan :  See detailed assessment and plan developed with APC which I have reviewed and/or edited for completeness.            Vy Alston MD  07/18/23                      Electronically signed by Vy Alston MD at 07/18/23 1839       Current Facility-Administered Medications   Medication Dose Route Frequency Provider Last Rate Last Admin    acetaminophen (TYLENOL) tablet 650 mg  650 mg Oral Q4H PRN Charis Iniguez APRN        aluminum-magnesium hydroxide-simethicone (MAALOX MAX) 400-400-40 MG/5ML suspension 15 mL  15 mL Oral Q6H PRN Charis Iniguez APRN        sennosides-docusate (PERICOLACE) 8.6-50 MG per tablet 2 tablet  2 tablet Oral BID Charis Iniguez APRN   2 tablet at 07/19/23 0958    And    polyethylene glycol (MIRALAX) packet 17 g  17 g Oral Daily PRN Charis Iniguez APRN        And    bisacodyl (DULCOLAX) EC tablet 5 mg  5 mg Oral Daily PRN Charis Iniguez APRN        And    bisacodyl (DULCOLAX) suppository 10 mg  10 mg Rectal Daily PRN Charis Iniguez APRN        Calcium Replacement - Follow Nurse / BPA Driven Protocol   Does not apply PRN Charis Iniguez APRN        cefTRIAXone (ROCEPHIN) 1000 mg/100 mL 0.9% NS (MBP)  1,000 mg Intravenous Q24H Maria E Silva DO        dilTIAZem CD (CARDIZEM CD) 24 hr capsule 120 mg  120 mg Oral Daily Charis Iniguez APRN   120 mg at 07/19/23 0958    doxycycline (MONODOX) capsule 100 mg  100 mg Oral Q12H Maria E Silva DO   100 mg at 07/19/23 0958    famotidine (PEPCID) tablet 20 mg  20 mg Oral BID AC Charis Iniguez APRN   20 mg at 07/19/23 1752    folic acid (FOLVITE) tablet 1 mg  1 mg Oral Daily Charis Iniguez APRN   1 mg at 07/19/23 0958    lactobacillus acidophilus (RISAQUAD) capsule 1 capsule  1 capsule Oral Daily Charis Iniguez APRN   1 capsule at 07/19/23 0957    losartan (COZAAR) tablet 25 mg  25 mg Oral Daily Charis Iniguez APRN   25 mg at 07/19/23 0958    Magnesium Standard Dose Replacement - Follow Nurse  / BPA Driven Protocol   Does not apply PRN Hira Charis R, APRN        melatonin tablet 5 mg  5 mg Oral Nightly PRN Hira Charis R, APRN   5 mg at 07/18/23 2125    metoprolol tartrate (LOPRESSOR) tablet 25 mg  25 mg Oral Q12H Hira, Charis R, APRN   25 mg at 07/19/23 0958    morphine injection 2 mg  2 mg Intravenous Q2H PRN Jazmine Jamison, APRDIGNA        And    naloxone (NARCAN) injection 0.4 mg  0.4 mg Intravenous Q5 Min PRN Jazmine Jamison, APRDIGNA        ondansetron (ZOFRAN) injection 4 mg  4 mg Intravenous Q6H PRN Humberto Iniguezca DUGLAS, APRDIGNA        oxyCODONE-acetaminophen (PERCOCET) 7.5-325 MG per tablet 1 tablet  1 tablet Oral Q4H PRN Hira Charis R, APRN        Pharmacy to dose warfarin   Does not apply Continuous PRN Hira Charis R, APRN        Phosphorus Replacement - Follow Nurse / BPA Driven Protocol   Does not apply PRN Humberto Iniguezca R, APRN        Potassium Replacement - Follow Nurse / BPA Driven Protocol   Does not apply PRN Hira Charis R, APRN        pravastatin (PRAVACHOL) tablet 40 mg  40 mg Oral Nightly Humberto Iniguezca R, APRN   40 mg at 07/18/23 2125    sodium chloride 0.9 % flush 10 mL  10 mL Intravenous PRN Larry Johnson DO        sodium chloride 0.9 % flush 10 mL  10 mL Intravenous Q12H Hira Charis R, APRN   10 mL at 07/19/23 1001    sodium chloride 0.9 % flush 10 mL  10 mL Intravenous PRN Hira Charis R, APRN        sodium chloride 0.9 % infusion 40 mL  40 mL Intravenous PRN Hira Charis R, APRN        warfarin (COUMADIN) (dosing per levels)   Does not apply Daily Vy Alston MD            Physician Progress Notes (last 72 hours)        Kelley Armenta MD at 07/19/23 9745            Subjective     PROBLEM LIST:    Cancer associated pain    Right lower lobe lung mass    Metastasis    Coronary artery disease with history of myocardial infarction without history of CABG    COPD (chronic obstructive pulmonary disease)    Essential hypertension    Hyperlipidemia    Functional  assessment declined    PAF (paroxysmal atrial fibrillation)    Pneumonia of right lung due to infectious organism    Supratherapeutic INR    Lactic acidosis    Leukocytosis    CHF (congestive heart failure)         CHIEF COMPLAINT: Metastatic cancer    HISTORY OF PRESENT ILLNESS:  The patient is a 82 y.o. male, referred  for evaluation of presumed metastatic lung cancer.  History mostly obtained from his daughter and son at the bedside.    He was fairly active and healthy until a few months ago when he started having pain.  He was found to have a lung mass with metastasis involving the liver and bone.    He received palliative radiotherapy to the spine today.    REVIEW OF SYSTEMS:  A 14 point review of systems was performed and is negative except as noted above.    Past Medical History:   Diagnosis Date    Anemia     Arthritis     Atrial fibrillation     Bowel obstruction     Bradycardia     CHF (congestive heart failure)     COPD (chronic obstructive pulmonary disease)     Coronary artery disease     GERD (gastroesophageal reflux disease)     Hearing loss     Hyperlipidemia     Injury of back     Myocardial infarction     Sciatica        No current facility-administered medications on file prior to encounter.     Current Outpatient Medications on File Prior to Encounter   Medication Sig Dispense Refill    digoxin (LANOXIN) 125 MCG tablet Take 1 tablet by mouth Every Other Day.      finasteride (PROSCAR) 5 MG tablet Take 1 tablet by mouth Every Night.      folic acid (FOLVITE) 1 MG tablet Take 1 tablet by mouth Daily.      losartan (COZAAR) 25 MG tablet Take 80 mg by mouth Daily.      metoprolol tartrate (LOPRESSOR) 25 MG tablet Take 4 tablets by mouth 2 (Two) Times a Day.      montelukast (SINGULAIR) 10 MG tablet Take 1 tablet by mouth 2 (Two) Times a Day.      pravastatin (PRAVACHOL) 40 MG tablet Take 1 tablet by mouth Daily.      tamsulosin (FLOMAX) 0.4 MG capsule 24 hr capsule Take 1 capsule by mouth Every  Night.      warfarin (COUMADIN) 4 MG tablet Take 1 tablet by mouth Daily.      HYDROcodone-acetaminophen (NORCO)  MG per tablet Take 1 tablet by mouth Every 6 (Six) Hours As Needed for Moderate Pain.      nitroglycerin (NITROSTAT) 0.4 MG SL tablet Place 1 tablet under the tongue Every 5 (Five) Minutes As Needed for Chest Pain. Take no more than 3 doses in 15 minutes.      traMADol (ULTRAM) 50 MG tablet Take 1 tablet by mouth 2 (Two) Times a Day As Needed for Moderate Pain.         Allergies   Allergen Reactions    Bee Venom Anaphylaxis       Past Surgical History:   Procedure Laterality Date    CARDIAC SURGERY      2006    CORONARY ARTERY BYPASS GRAFT      EYE SURGERY      HEMORRHOIDECTOMY         OB History   No obstetric history on file.       Social History     Socioeconomic History    Marital status:    Tobacco Use    Smoking status: Former     Packs/day: 2.00     Years: 15.00     Pack years: 30.00     Types: Cigarettes    Smokeless tobacco: Never   Vaping Use    Vaping Use: Never used   Substance and Sexual Activity    Alcohol use: Never    Drug use: Never    Sexual activity: Defer       Family History   Problem Relation Age of Onset    Lung cancer Sister     Lung cancer Brother        Objective     Vitals:    07/19/23 0600 07/19/23 0742 07/19/23 0958 07/19/23 1203   BP:  107/69 111/75 129/74   BP Location:  Left arm  Left arm   Patient Position:  Lying  Lying   Pulse: 114  114 114   Resp:  18  18   Temp:  98.1 °F (36.7 °C)  97.9 °F (36.6 °C)   TempSrc:  Oral  Oral   SpO2: 96%   97%   Weight:       Height:                       Performance Status: 3  General: well appearing male in no acute distress  Neuro: [place]  HEENT: sclerae anicteric, oropharynx clear  Lymphatics: no cervical, supraclavicular, or axillary adenopathy  Cardiovascular: regular rate and rhythm, no murmurs  Lungs:decreased right base  Abdomen: soft, nontender, nondistended.  No palpable organomegaly  Extremities: no lower extremity  edema  Skin: no rashes, lesions, bruising, or petechiae  Psych: mood and affect appropriate    Lab Results   Component Value Date    WBC 12.67 (H) 07/19/2023    HGB 11.7 (L) 07/19/2023    HCT 34.6 (L) 07/19/2023    MCV 87.8 07/19/2023     07/19/2023     Lab Results   Component Value Date    GLUCOSE 100 (H) 07/19/2023    BUN 15 07/19/2023    CREATININE 0.56 (L) 07/19/2023    EGFRIFNONA 82 03/15/2018    BCR 26.8 (H) 07/19/2023    K 3.6 07/19/2023    CO2 18.0 (L) 07/19/2023    CALCIUM 8.9 07/19/2023    ALBUMIN 2.9 (L) 07/19/2023    AST 24 07/19/2023    ALT 12 07/19/2023     MRI Lumbar Spine With & Without Contrast  Narrative: MRI LUMBAR SPINE W WO CONTRAST    Date of Exam: 7/18/2023 2:27 PM EDT    Indication: Low back pain.  Bilateral lower extremity weakness and decreased sensation.  Reported lesion in the lumbar spine from outside hospital..     Comparison: CT abdomen pelvis from June 7, 2023    Technique:  Routine multiplanar/multisequence sequence images of the lumbar spine were obtained before and after the uneventful administration of 14 mL Multihance.      Findings:  The alignment is anatomic. The vertebral body heights appear normal. There are multiple T1 hypointense enhancing lesions most prominent within the L3 vertebral body most compatible with metastasis. There is an associated mild compression deformity at L3,   likely a pathological fracture. No epidural spread of tumor is identified. There are degenerative endplate changes at L3-4 and L4-5. There is disc desiccation at all levels, with moderate degenerative loss of disc height at L4-5. The conus terminates at   the T12-L1 level. The posterior paravertebral soft tissues are unremarkable.    L1-2: Mild disc bulge. Mild bilateral facet arthropathy. No spinal canal stenosis. No neural foraminal stenosis.    L2-3: Mild disc bulge eccentric to the left. Mild right and moderate facet arthropathy with ligamentum flavum infolding. Mild spinal canal  "stenosis. Mild right and mild to moderate left neural foraminal stenosis.    L3-4: Mild disc bulge. Moderate bilateral facet arthropathy with ligamentum flavum infolding. Mild to moderate spinal canal stenosis. Mild to moderate right and moderate left neural foramen stenosis.    L4-5: Moderate disc bulge. Moderate bilateral facet arthropathy with ligamentum flavum infolding. Mild spinal canal stenosis. Moderate right and mild to moderate left neural foraminal stenosis.    L5-S1: Mild disc bulge eccentric to the right narrowing the right lateral recess. Moderate right and mild left facet arthropathy. No spinal canal stenosis. Moderate right and mild left neural foraminal stenosis.  Impression: Impression:  1.Multiple enhancing lesions within lumbar spine most prominent at L3, most compatible with osseous metastasis. There is an associated mild compression deformity at L3, likely a pathological fracture.  2.Mild to moderate multilevel degenerative changes of lumbar spine as described above.    Electronically Signed: Ruddy Rodriguez    7/18/2023 3:32 PM EDT    Workstation ID: [id number]  CT Abdomen Pelvis With Contrast  Narrative: CT ABDOMEN PELVIS W CONTRAST    Date of Exam: 7/18/2023 1:08 PM EDT    Indication: Low back pain with reported lesion to the bar spine at outside 20.  Weight loss and poor appetite.  Admitted 1 month ago side hospital for \"abdominal infection \".    Comparison: June 7, 2023    Technique: Axial CT images were obtained of the abdomen and pelvis following the uneventful intravenous administration of 85 mL Isovue-370. Reconstructed coronal and sagittal images were also obtained. Automated exposure control and iterative   construction methods were used.    Findings:  Hypodense lesions within the liver appear increased in size and number. An index lesion within the left hepatic lobe on image 33 measures 3.6 x 3.1 cm, previously 1.1 x 1.0 cm. The gallbladder, adrenal glands, right kidney, spleen, and " pancreas are   unremarkable. There are small left renal cyst.    The stomach appears normal. The small bowel appears normal in caliber and configuration. The colon appears normal. The appendix appears normal. There is no ascites or loculated collection. No abnormally enlarged lymph nodes are identified.    The rectum and urinary bladder are unremarkable. The prostate is enlarged measuring 4.8 cm in transverse dimension.    Several osseous metastases have increased in size, for example an index lesion involving the right L3 vertebral body measuring 4.9 cm, previously 3.2 cm. Another metastasis is seen within the left ischium.  Impression: Impression:  1.Progressive hypodense lesions within liver and progressive osseous metastasis, compatible with disease progression.  2.No acute process identified within abdomen/pelvis.  3.Enlarged prostate.    Electronically Signed: Ruddy Rodriguez    7/18/2023 2:17 PM EDT    Workstation ID: [id number]  CT Angiogram Chest  Narrative: CT ANGIOGRAM CHEST    Date of Exam: 7/18/2023 1:08 PM EDT    Indication: Per family, oxygen level 80% this morning.  Weight loss over the past 1 month.  Possible malignancy..    Comparison: None available.    Technique: Axial pulmonary arterial phase IV contrast enhanced CT angiogram of the chest per PE protocol. Two-dimensional reconstructions were postprocessed. Axial IV contrast-enhanced CT of the abdomen and pelvis with multiplanar reconstruction.    Findings:  CTA chest: There is no pathologic axillary adenopathy or other worrisome body wall soft tissue finding in the chest. Trace pleural effusion is present on the right. There is no pericardial effusion. Atherosclerotic, nonaneurysmal thoracic aorta. The   pulmonary arteries are patent and well opacified, without evidence of filling defect concerning for acute pulmonary embolus. There is no distinct pathologic mediastinal adenopathy. Evaluation of the osseous structures demonstrates no evidence of  acute   fracture or aggressive osseous lesion. Evaluation of the lung fields demonstrates fairly extensive confluent consolidation in the right lower lobe, with some evidence of endobronchial obstruction, with underlying mass not entirely excluded. The remaining   lung fields demonstrate moderate emphysema and mild diffuse peribronchial groundglass opacity, suggesting component of bronchiolitis.  Impression: Impression:  Prominent consolidation is noted in the right lower lobe, somewhat masslike with evidence of endobronchial obstruction, concerning for underlying possible primary or metastatic neoplasm. There is otherwise no suspicious thoracic adenopathy.    Diffuse areas of centrilobular groundglass opacity are present, suggesting component of bronchiolitis. No additional acute findings are present. There is no acute pulmonary embolus.    Electronically Signed: Eder Mireles    2023 2:06 PM EDT    Workstation ID: [id number]          Assessment & Plan     Phuc Bowden is a 82 y.o. male with presumed metastatic lung cancer.    Agree with the previously noted plan of no biopsy and palliative radiotherapy.  Discussed potential expectations and prognosis with his children at the bedside.  Hospice care appropriate.    We will sign off, please call with further questions.          Kelley Armenta MD    2023           Electronically signed by Kelley Armenta MD at 23 1742       Maria E Silva DO at 23 1026              [facility] Medicine Services  PROGRESS NOTE    Patient Name: Phuc Bowden  : 1940  MRN: [id number]    Date of Admission: 2023  Primary Care Physician: Blayne Adan MD    Subjective   Subjective     CC:  Lower extremity pain    HPI:  Patient with continued pain overnight.  Difficulty rolling due to pain.  States that his right shoulder hurts due to rotator cuff tear.  Son at bedside and states they have declined biopsy.    ROS:  Gen- No fevers,  chills  CV- No chest pain, palpitations  Resp- No cough, dyspnea  GI- No N/V/D, abd pain    Objective   Objective     Vital Signs:   Temp:  [97.5 °F (36.4 °C)-99.2 °F (37.3 °C)] 98.1 °F (36.7 °C)  Heart Rate:  [] 114  Resp:  [16-20] 18  BP: (103-147)/(57-98) 111/75     Physical Exam:  Constitutional: No acute distress, awake, alert  HENT: NCAT, mucous membranes moist; hard of hearing   Respiratory: diminished; poor effort  Cardiovascular: tachy, irregular, no murmurs, rubs, or gallops  Gastrointestinal: Positive bowel sounds, soft, nontender, nondistended  Musculoskeletal: No bilateral ankle edema  Psychiatric: Appropriate affect, cooperative  Neurologic: Oriented x 3, strength symmetric in all extremities, Cranial Nerves grossly intact to confrontation, speech clear  Skin: No rashes      Results Reviewed:  LAB RESULTS:      Lab 07/19/23  0656 07/18/23  1534 07/18/23  1331 07/18/23  1251   WBC 12.67*  --   --  16.55*   HEMOGLOBIN 11.7*  --   --  13.7   HEMATOCRIT 34.6*  --   --  42.1   PLATELETS 206  --   --  284   NEUTROS ABS 10.63*  --   --  13.99*   IMMATURE GRANS (ABS) 0.16*  --   --  0.42*   LYMPHS ABS 0.78  --   --  0.87   MONOS ABS 1.07*  --   --  1.21*   EOS ABS 0.00  --   --  0.01   MCV 87.8  --   --  90.0   PROCALCITONIN  --   --  0.27*  --    LACTATE  --  1.4  --  2.6*   PROTIME 35.3*  --   --  53.3*         Lab 07/19/23  0656 07/18/23  1331 07/18/23  1253   SODIUM 137 136  --    POTASSIUM 3.6 4.1  --    CHLORIDE 102 97*  --    CO2 18.0* 22.0  --    ANION GAP 17.0* 17.0*  --    BUN 15 17  --    CREATININE 0.56* 0.98 0.90   EGFR 99.0 77.5  --    GLUCOSE 100* 115*  --    CALCIUM 8.9 10.3  --    MAGNESIUM 1.8  --   --          Lab 07/19/23  0656 07/18/23  1331   TOTAL PROTEIN 5.7* 7.7   ALBUMIN 2.9* 3.6   GLOBULIN 2.8 4.1   ALT (SGPT) 12 16   AST (SGOT) 24 28   BILIRUBIN 1.6* 2.0*   ALK PHOS 230* 270*         Lab 07/19/23  0656 07/18/23  1331 07/18/23  1251   PROBNP  --  11,337.0*  --    HSTROP T  --   21*  --    PROTIME 35.3*  --  53.3*   INR 3.49*  --  5.91*                 Brief Urine Lab Results  (Last result in the past 365 days)        Color   Clarity   Blood   Leuk Est   Nitrite   Protein   CREAT   Urine HCG        07/18/23 1548 Yellow   Clear   Trace   Negative   Negative   Trace                   Microbiology Results Abnormal       Procedure Component Value - Date/Time    MRSA Screen, PCR (Inpatient) - Swab, Nares [[id number]]  (Normal) Collected: 07/19/23 0542    Lab Status: Final result Specimen: Swab from Nares Updated: 07/19/23 0840     MRSA PCR Negative    Narrative:      The negative predictive value of this diagnostic test is high and should only be used to consider de-escalating anti-MRSA therapy. A positive result may indicate colonization with MRSA and must be correlated clinically.  MRSA Negative    S. Pneumo Ag Urine or CSF - Urine, Urine, Clean Catch [[id number]]  (Normal) Collected: 07/18/23 1548    Lab Status: Final result Specimen: Urine, Clean Catch Updated: 07/19/23 0117     Strep Pneumo Ag Negative    Legionella Antigen, Urine - Urine, Urine, Clean Catch [[id number]]  (Normal) Collected: 07/18/23 1548    Lab Status: Final result Specimen: Urine, Clean Catch Updated: 07/19/23 0117     LEGIONELLA ANTIGEN, URINE Negative    Respiratory Panel PCR w/COVID-19(SARS-CoV-2) CASSIDY/TAMELA/ARTURO/PAD/COR/MAD/GRETCHEN In-House, NP Swab in UTM/VTM, 3-4 HR TAT - Swab, Nasopharynx [[id number]]  (Normal) Collected: 07/18/23 1619    Lab Status: Final result Specimen: Swab from Nasopharynx Updated: 07/18/23 1716     ADENOVIRUS, PCR Not Detected     Coronavirus 229E Not Detected     Coronavirus HKU1 Not Detected     Coronavirus NL63 Not Detected     Coronavirus OC43 Not Detected     COVID19 Not Detected     Human Metapneumovirus Not Detected     Human Rhinovirus/Enterovirus Not Detected     Influenza A PCR Not Detected     Influenza B PCR Not Detected     Parainfluenza Virus 1 Not Detected     Parainfluenza Virus 2 Not  "Detected     Parainfluenza Virus 3 Not Detected     Parainfluenza Virus 4 Not Detected     RSV, PCR Not Detected     Bordetella pertussis pcr Not Detected     Bordetella parapertussis PCR Not Detected     Chlamydophila pneumoniae PCR Not Detected     Mycoplasma pneumo by PCR Not Detected    Narrative:      In the setting of a positive respiratory panel with a viral infection PLUS a negative procalcitonin without other underlying concern for bacterial infection, consider observing off antibiotics or discontinuation of antibiotics and continue supportive care. If the respiratory panel is positive for atypical bacterial infection (Bordetella pertussis, Chlamydophila pneumoniae, or Mycoplasma pneumoniae), consider antibiotic de-escalation to target atypical bacterial infection.            CT Abdomen Pelvis With Contrast    Result Date: 7/18/2023  CT ABDOMEN PELVIS W CONTRAST Date of Exam: 7/18/2023 1:08 PM EDT Indication: Low back pain with reported lesion to the bar spine at outside 20.  Weight loss and poor appetite.  Admitted 1 month ago side hospital for \"abdominal infection \". Comparison: June 7, 2023 Technique: Axial CT images were obtained of the abdomen and pelvis following the uneventful intravenous administration of 85 mL Isovue-370. Reconstructed coronal and sagittal images were also obtained. Automated exposure control and iterative construction methods were used. Findings: Hypodense lesions within the liver appear increased in size and number. An index lesion within the left hepatic lobe on image 33 measures 3.6 x 3.1 cm, previously 1.1 x 1.0 cm. The gallbladder, adrenal glands, right kidney, spleen, and pancreas are unremarkable. There are small left renal cyst. The stomach appears normal. The small bowel appears normal in caliber and configuration. The colon appears normal. The appendix appears normal. There is no ascites or loculated collection. No abnormally enlarged lymph nodes are identified. The " rectum and urinary bladder are unremarkable. The prostate is enlarged measuring 4.8 cm in transverse dimension. Several osseous metastases have increased in size, for example an index lesion involving the right L3 vertebral body measuring 4.9 cm, previously 3.2 cm. Another metastasis is seen within the left ischium.     Impression: Impression: 1.Progressive hypodense lesions within liver and progressive osseous metastasis, compatible with disease progression. 2.No acute process identified within abdomen/pelvis. 3.Enlarged prostate. Electronically Signed: Ruddy Rodriguez  7/18/2023 2:17 PM EDT  Workstation ID: [id number]    CT Angiogram Chest    Result Date: 7/18/2023  CT ANGIOGRAM CHEST Date of Exam: 7/18/2023 1:08 PM EDT Indication: Per family, oxygen level 80% this morning.  Weight loss over the past 1 month.  Possible malignancy.. Comparison: None available. Technique: Axial pulmonary arterial phase IV contrast enhanced CT angiogram of the chest per PE protocol. Two-dimensional reconstructions were postprocessed. Axial IV contrast-enhanced CT of the abdomen and pelvis with multiplanar reconstruction. Findings: CTA chest: There is no pathologic axillary adenopathy or other worrisome body wall soft tissue finding in the chest. Trace pleural effusion is present on the right. There is no pericardial effusion. Atherosclerotic, nonaneurysmal thoracic aorta. The pulmonary arteries are patent and well opacified, without evidence of filling defect concerning for acute pulmonary embolus. There is no distinct pathologic mediastinal adenopathy. Evaluation of the osseous structures demonstrates no evidence of acute fracture or aggressive osseous lesion. Evaluation of the lung fields demonstrates fairly extensive confluent consolidation in the right lower lobe, with some evidence of endobronchial obstruction, with underlying mass not entirely excluded. The remaining  lung fields demonstrate moderate emphysema and mild diffuse  peribronchial groundglass opacity, suggesting component of bronchiolitis.     Impression: Impression: Prominent consolidation is noted in the right lower lobe, somewhat masslike with evidence of endobronchial obstruction, concerning for underlying possible primary or metastatic neoplasm. There is otherwise no suspicious thoracic adenopathy. Diffuse areas of centrilobular groundglass opacity are present, suggesting component of bronchiolitis. No additional acute findings are present. There is no acute pulmonary embolus. Electronically Signed: Eder Mireles  7/18/2023 2:06 PM EDT  Workstation ID: [id number]    MRI Lumbar Spine With & Without Contrast    Result Date: 7/18/2023  MRI LUMBAR SPINE W WO CONTRAST Date of Exam: 7/18/2023 2:27 PM EDT Indication: Low back pain.  Bilateral lower extremity weakness and decreased sensation.  Reported lesion in the lumbar spine from outside hospital..  Comparison: CT abdomen pelvis from June 7, 2023 Technique:  Routine multiplanar/multisequence sequence images of the lumbar spine were obtained before and after the uneventful administration of 14 mL Multihance.  Findings: The alignment is anatomic. The vertebral body heights appear normal. There are multiple T1 hypointense enhancing lesions most prominent within the L3 vertebral body most compatible with metastasis. There is an associated mild compression deformity at L3,  likely a pathological fracture. No epidural spread of tumor is identified. There are degenerative endplate changes at L3-4 and L4-5. There is disc desiccation at all levels, with moderate degenerative loss of disc height at L4-5. The conus terminates at  the T12-L1 level. The posterior paravertebral soft tissues are unremarkable. L1-2: Mild disc bulge. Mild bilateral facet arthropathy. No spinal canal stenosis. No neural foraminal stenosis. L2-3: Mild disc bulge eccentric to the left. Mild right and moderate facet arthropathy with ligamentum flavum infolding. Mild  spinal canal stenosis. Mild right and mild to moderate left neural foraminal stenosis. L3-4: Mild disc bulge. Moderate bilateral facet arthropathy with ligamentum flavum infolding. Mild to moderate spinal canal stenosis. Mild to moderate right and moderate left neural foramen stenosis. L4-5: Moderate disc bulge. Moderate bilateral facet arthropathy with ligamentum flavum infolding. Mild spinal canal stenosis. Moderate right and mild to moderate left neural foraminal stenosis. L5-S1: Mild disc bulge eccentric to the right narrowing the right lateral recess. Moderate right and mild left facet arthropathy. No spinal canal stenosis. Moderate right and mild left neural foraminal stenosis.     Impression: Impression: 1.Multiple enhancing lesions within lumbar spine most prominent at L3, most compatible with osseous metastasis. There is an associated mild compression deformity at L3, likely a pathological fracture. 2.Mild to moderate multilevel degenerative changes of lumbar spine as described above. Electronically Signed: Ruddy Rodriguez  7/18/2023 3:32 PM EDT  Workstation ID: [id number]         Current medications:  Scheduled Meds:cefTRIAXone, 1,000 mg, Intravenous, Q24H  dilTIAZem CD, 120 mg, Oral, Daily  doxycycline, 100 mg, Oral, Q12H  famotidine, 20 mg, Oral, BID AC  folic acid, 1 mg, Oral, Daily  lactobacillus acidophilus, 1 capsule, Oral, Daily  losartan, 25 mg, Oral, Daily  metoprolol tartrate, 25 mg, Oral, Q12H  potassium chloride, 10 mEq, Intravenous, Q1H  pravastatin, 40 mg, Oral, Nightly  senna-docusate sodium, 2 tablet, Oral, BID  sodium chloride, 10 mL, Intravenous, Q12H  warfarin (COUMADIN) (dosing per levels), , Does not apply, Daily      Continuous Infusions:Pharmacy to dose warfarin,       PRN Meds:.  acetaminophen    aluminum-magnesium hydroxide-simethicone    senna-docusate sodium **AND** polyethylene glycol **AND** bisacodyl **AND** bisacodyl    Calcium Replacement - Follow Nurse / BPA Driven Protocol     Magnesium Standard Dose Replacement - Follow Nurse / BPA Driven Protocol    melatonin    Morphine **AND** naloxone    ondansetron    oxyCODONE-acetaminophen    Pharmacy to dose warfarin    Phosphorus Replacement - Follow Nurse / BPA Driven Protocol    Potassium Replacement - Follow Nurse / BPA Driven Protocol    sodium chloride    sodium chloride    sodium chloride    Assessment & Plan   Assessment & Plan     Active Hospital Problems    Diagnosis  POA    **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]  Yes    Right lower lobe lung mass [R91.8]  Unknown    Metastasis [C79.9]  Unknown    Coronary artery disease with history of myocardial infarction without history of CABG [I25.10, I25.2]  Not Applicable    COPD (chronic obstructive pulmonary disease) [J44.9]  Unknown    Essential hypertension [I10]  Unknown    Hyperlipidemia [E78.5]  Unknown    Functional assessment declined [Z53.20]  Not Applicable    PAF (paroxysmal atrial fibrillation) [I48.0]  Unknown    Pneumonia of right lung due to infectious organism [J18.9]  Unknown    Supratherapeutic INR [R79.1]  Unknown    Lactic acidosis [E87.20]  Unknown    Leukocytosis [D72.829]  Unknown    CHF (congestive heart failure) [I50.9]  Unknown      Resolved Hospital Problems   No resolved problems to display.        Brief Hospital Course to date:  Phuc Bowden is a 82 y.o. male with pmh significant for CAD s/p MI and CABG, PAF on warfarin, skin cancer, HTN, HLD, hearing loss, previous tobacco use long term with COPD, GERD, presents with worsening low back pain x 2 months.  Patient had a recent fall and evaluation at that time showed concerns for bony mets.  Imaging also with liver mets as well as a right lung mass.  Patient was to have a biopsy at  however this was not done due to warfarin.  He has had progressive pain and weakness prompting evaluation.    Intractable Back Pain with functional decline/ FTT  Right lung mass with liver/ spinal mets  -- recent discovery of lung mass in  June upon OSH ED with pending work up through [facility], however pain and debility increasing  -Oncology consulted.  Reviewed treatment be palliative in nature.  Family has opted to not undergo biopsy of right lung mass.  -Radiation oncology consult  -Palliative care consult  -- start oxycodone and morphine prn pain (failed tramadol and norco 10/325 outpatient).  -- PT/OT/ nutrition assessment     Right sided pneumonia, post obstructive  Leukocytosis, lactic acidosis  -- zosyn and vanc started in ED.  -Blood cultures pending.  Strep and Legionella negative.  MRSA negative.  Respiratory panel negative.  -Procalcitonin elevated with leukocytosis.  -Changed from Zosyn to Rocephin Doxy.     PAF  Supratherapeutic INR  -- hold warfarin, pharmacy consulted  -- vitamin K for INR 5.9; no evidence of bleeding  -- continue metoprolol and cardizem     HTN  HLD  CAD s/p CABG  CHF  -- continue losartan, metoprolol, pravastatin    Expected Discharge Location and Transportation: Likely home  Expected Discharge   Expected Discharge Date: 7/25/2023; Expected Discharge Time:      DVT prophylaxis:  Medical DVT prophylaxis orders are present.          CODE STATUS:   Code Status and Medical Interventions:   Ordered at: 07/18/23 1604     Medical Intervention Limits:    NO intubation (DNI)    NO cardioversion     Code Status (Patient has no pulse and is not breathing):    No CPR (Do Not Attempt to Resuscitate)     Medical Interventions (Patient has pulse or is breathing):    Limited Support       Maria E Silva DO  07/19/23        Electronically signed by Maria E Silva DO at 07/19/23 1030          Consult Notes (last 7 days)        Herlinda Olivier MD at 07/18/23 1722          Subjective     CHIEF COMPLAINT: Back pain    HISTORY OF PRESENT ILLNESS:  The patient is a 82 y.o. male, referred by Vy Alston MD for metastatic lung cancer.  The patient was in his usual state of health until approximately 3 months ago.  He presented with low  back pain.  This has been gradually getting worse.  Get worse with activity and improves partially with rest.  He noted some poor appetite.  Lost 30 pounds over the last 2 months.  He presented to [facility] emergency room today CT scans and MRI lumbar spine confirmed metastatic right lower lobe lung cancer with liver and bony mets worse at L3.  I was consulted by the emergency room physician Dr. Johnson further assist in the patient's care.  When I saw the patient today he is laying comfortable in bed.  His daughter and son-in-law are at the bedside.  He had previous skin cancer removed of his nose.  His goal is to focus on quality of life.      REVIEW OF SYSTEMS:  A 14 point review of systems was performed and is negative except as noted above.    Past Medical History:   Diagnosis Date    Anemia     Arthritis     Atrial fibrillation     Bowel obstruction     Bradycardia     CHF (congestive heart failure)     COPD (chronic obstructive pulmonary disease)     Coronary artery disease     GERD (gastroesophageal reflux disease)     Hearing loss     Hyperlipidemia     Injury of back     Myocardial infarction     Sciatica        No current facility-administered medications on file prior to encounter.     Current Outpatient Medications on File Prior to Encounter   Medication Sig Dispense Refill    digoxin (LANOXIN) 125 MCG tablet Take 1 tablet by mouth Every Other Day.      finasteride (PROSCAR) 5 MG tablet Take 1 tablet by mouth Every Night.      folic acid (FOLVITE) 1 MG tablet Take 1 tablet by mouth Daily.      losartan (COZAAR) 25 MG tablet Take 80 mg by mouth Daily.      metoprolol tartrate (LOPRESSOR) 25 MG tablet Take 4 tablets by mouth 2 (Two) Times a Day.      montelukast (SINGULAIR) 10 MG tablet Take 1 tablet by mouth 2 (Two) Times a Day.      pravastatin (PRAVACHOL) 40 MG tablet Take 1 tablet by mouth Daily.      tamsulosin (FLOMAX) 0.4 MG capsule 24 hr capsule Take 1 capsule by mouth Every Night.    "   warfarin (COUMADIN) 4 MG tablet Take 1 tablet by mouth Daily.      HYDROcodone-acetaminophen (NORCO)  MG per tablet Take 1 tablet by mouth Every 6 (Six) Hours As Needed for Moderate Pain.      nitroglycerin (NITROSTAT) 0.4 MG SL tablet Place 1 tablet under the tongue Every 5 (Five) Minutes As Needed for Chest Pain. Take no more than 3 doses in 15 minutes.      traMADol (ULTRAM) 50 MG tablet Take 1 tablet by mouth 2 (Two) Times a Day As Needed for Moderate Pain.      [DISCONTINUED] diltiaZEM CD (CARDIZEM CD) 120 MG 24 hr capsule Take 1 capsule by mouth Daily. 30 capsule 0    [DISCONTINUED] Omega-3 Fatty Acids (FISH OIL) 1000 MG capsule capsule Take  by mouth Daily With Breakfast.      [DISCONTINUED] raNITIdine (ZANTAC) 150 MG tablet Take 1 tablet by mouth Every Night.      [DISCONTINUED] warfarin (COUMADIN) 5 MG tablet Take 1 tablet by mouth Daily. 30 tablet 0       Allergies   Allergen Reactions    Bee Venom Anaphylaxis       Past Surgical History:   Procedure Laterality Date    CARDIAC SURGERY      2006    CORONARY ARTERY BYPASS GRAFT      EYE SURGERY      HEMORRHOIDECTOMY         OB History   No obstetric history on file.       Social History     Socioeconomic History    Marital status:        Family History   Problem Relation Age of Onset    Lung cancer Sister     Lung cancer Brother        Objective     Vitals:    07/18/23 1212 07/18/23 1533 07/18/23 1546   BP: 147/98  121/69   BP Location: Left arm     Patient Position: Sitting     Pulse: 89 72    Resp: 16     Temp: 97.5 °F (36.4 °C)     TempSrc: Oral     SpO2: 95% 92%    Weight: 69.9 kg (154 lb)     Height: 177.8 cm (70\")                          ECOG Performance Status: 3 - Symptomatic, >50% confined to bed  General: well appearing male in no acute distress  Neuro/Psych: A&O x 3, gait steady, appropriate affect, strength 5/5 in all muscle groups  HEENT: sclerae anicteric, oropharynx clear  Lymphatics: no cervical, supraclavicular, or axillary " adenopathy  Cardiovascular: regular rate and rhythm, no murmurs  Lungs: clear to auscultation bilaterally  Abdomen: soft, nontender, nondistended.  No palpable organomegaly  Extremities: no lower extremity edema  Skin: no rashes, lesions, bruising, or petechiae      Admission on 07/18/2023   Component Date Value Ref Range Status    QT Interval 07/18/2023 306  ms Preliminary    QTC Interval 07/18/2023 468  ms Preliminary    Protime 07/18/2023 53.3 (C)  12.2 - 14.5 Seconds Final    INR 07/18/2023 5.91 (C)  0.89 - 1.12 Final    Glucose 07/18/2023 115 (H)  65 - 99 mg/dL Final    BUN 07/18/2023 17  8 - 23 mg/dL Final    Creatinine 07/18/2023 0.98  0.76 - 1.27 mg/dL Final    Sodium 07/18/2023 136  136 - 145 mmol/L Final    Potassium 07/18/2023 4.1  3.5 - 5.2 mmol/L Final    Chloride 07/18/2023 97 (L)  98 - 107 mmol/L Final    CO2 07/18/2023 22.0  22.0 - 29.0 mmol/L Final    Calcium 07/18/2023 10.3  8.6 - 10.5 mg/dL Final    Total Protein 07/18/2023 7.7  6.0 - 8.5 g/dL Final    Albumin 07/18/2023 3.6  3.5 - 5.2 g/dL Final    ALT (SGPT) 07/18/2023 16  1 - 41 U/L Final    AST (SGOT) 07/18/2023 28  1 - 40 U/L Final    Alkaline Phosphatase 07/18/2023 270 (H)  39 - 117 U/L Final    Total Bilirubin 07/18/2023 2.0 (H)  0.0 - 1.2 mg/dL Final    Globulin 07/18/2023 4.1  gm/dL Final    Calculated Result    A/G Ratio 07/18/2023 0.9  g/dL Final    BUN/Creatinine Ratio 07/18/2023 17.3  7.0 - 25.0 Final    Anion Gap 07/18/2023 17.0 (H)  5.0 - 15.0 mmol/L Final    eGFR 07/18/2023 77.5  >60.0 mL/min/1.73 Final    proBNP 07/18/2023 11,337.0 (H)  0.0 - 1,800.0 pg/mL Final    HS Troponin T 07/18/2023 21 (H)  <15 ng/L Final    Extra Tube 07/18/2023 Hold for add-ons.   Final    Auto resulted.    Extra Tube 07/18/2023 hold for add-on   Final    Auto resulted    Extra Tube 07/18/2023 Hold for add-ons.   Final    Auto resulted.    Extra Tube 07/18/2023 Hold for add-ons.   Final    Auto resulted.    Extra Tube 07/18/2023 Hold for add-ons.   Final     Auto resulted    WBC 07/18/2023 16.55 (H)  3.40 - 10.80 10*3/mm3 Final    RBC 07/18/2023 4.68  4.14 - 5.80 10*6/mm3 Final    Hemoglobin 07/18/2023 13.7  13.0 - 17.7 g/dL Final    Hematocrit 07/18/2023 42.1  37.5 - 51.0 % Final    MCV 07/18/2023 90.0  79.0 - 97.0 fL Final    MCH 07/18/2023 29.3  26.6 - 33.0 pg Final    MCHC 07/18/2023 32.5  31.5 - 35.7 g/dL Final    RDW 07/18/2023 14.0  12.3 - 15.4 % Final    RDW-SD 07/18/2023 45.5  37.0 - 54.0 fl Final    MPV 07/18/2023 9.3  6.0 - 12.0 fL Final    Platelets 07/18/2023 284  140 - 450 10*3/mm3 Final    Neutrophil % 07/18/2023 84.5 (H)  42.7 - 76.0 % Final    Lymphocyte % 07/18/2023 5.3 (L)  19.6 - 45.3 % Final    Monocyte % 07/18/2023 7.3  5.0 - 12.0 % Final    Eosinophil % 07/18/2023 0.1 (L)  0.3 - 6.2 % Final    Basophil % 07/18/2023 0.3  0.0 - 1.5 % Final    Immature Grans % 07/18/2023 2.5 (H)  0.0 - 0.5 % Final    Neutrophils, Absolute 07/18/2023 13.99 (H)  1.70 - 7.00 10*3/mm3 Final    Lymphocytes, Absolute 07/18/2023 0.87  0.70 - 3.10 10*3/mm3 Final    Monocytes, Absolute 07/18/2023 1.21 (H)  0.10 - 0.90 10*3/mm3 Final    Eosinophils, Absolute 07/18/2023 0.01  0.00 - 0.40 10*3/mm3 Final    Basophils, Absolute 07/18/2023 0.05  0.00 - 0.20 10*3/mm3 Final    Immature Grans, Absolute 07/18/2023 0.42 (H)  0.00 - 0.05 10*3/mm3 Final    nRBC 07/18/2023 0.0  0.0 - 0.2 /100 WBC Final    Creatine Kinase 07/18/2023 94  20 - 200 U/L Final    Lactate 07/18/2023 2.6 (C)  0.5 - 2.0 mmol/L Final    Falsely depressed results may occur on samples drawn from patients receiving N-Acetylcysteine (NAC) or Metamizole.    Procalcitonin 07/18/2023 0.27 (H)  0.00 - 0.25 ng/mL Final    Lactate 07/18/2023 1.4  0.5 - 2.0 mmol/L Final    Falsely depressed results may occur on samples drawn from patients receiving N-Acetylcysteine (NAC) or Metamizole.    Color, UA 07/18/2023 Yellow  Yellow, Straw Final    Appearance, UA 07/18/2023 Clear  Clear Final    pH, UA 07/18/2023 5.5  5.0 - 8.0  Final    Specific Gravity, UA 07/18/2023 1.073 (H)  1.001 - 1.030 Final    Glucose, UA 07/18/2023 Negative  Negative Final    Ketones, UA 07/18/2023 Negative  Negative Final    Bilirubin, UA 07/18/2023 Negative  Negative Final    Blood, UA 07/18/2023 Trace (A)  Negative Final    Protein, UA 07/18/2023 Trace (A)  Negative Final    Leuk Esterase, UA 07/18/2023 Negative  Negative Final    Nitrite, UA 07/18/2023 Negative  Negative Final    Urobilinogen, UA 07/18/2023 0.2 E.U./dL  0.2 - 1.0 E.U./dL Final    ADENOVIRUS, PCR 07/18/2023 Not Detected  Not Detected Final    Coronavirus 229E 07/18/2023 Not Detected  Not Detected Final    Coronavirus HKU1 07/18/2023 Not Detected  Not Detected Final    Coronavirus NL63 07/18/2023 Not Detected  Not Detected Final    Coronavirus OC43 07/18/2023 Not Detected  Not Detected Final    COVID19 07/18/2023 Not Detected  Not Detected - Ref. Range Final    Human Metapneumovirus 07/18/2023 Not Detected  Not Detected Final    Human Rhinovirus/Enterovirus 07/18/2023 Not Detected  Not Detected Final    Influenza A PCR 07/18/2023 Not Detected  Not Detected Final    Influenza B PCR 07/18/2023 Not Detected  Not Detected Final    Parainfluenza Virus 1 07/18/2023 Not Detected  Not Detected Final    Parainfluenza Virus 2 07/18/2023 Not Detected  Not Detected Final    Parainfluenza Virus 3 07/18/2023 Not Detected  Not Detected Final    Parainfluenza Virus 4 07/18/2023 Not Detected  Not Detected Final    RSV, PCR 07/18/2023 Not Detected  Not Detected Final    Bordetella pertussis pcr 07/18/2023 Not Detected  Not Detected Final    Bordetella parapertussis PCR 07/18/2023 Not Detected  Not Detected Final    Chlamydophila pneumoniae PCR 07/18/2023 Not Detected  Not Detected Final    Mycoplasma pneumo by PCR 07/18/2023 Not Detected  Not Detected Final    RBC, UA 07/18/2023 0-2  None Seen, 0-2 /HPF Final    WBC, UA 07/18/2023 3-5 (A)  None Seen, 0-2 /HPF Final    Urine culture not indicated.    Bacteria, UA  07/18/2023 None Seen  None Seen, Trace /HPF Final    Squamous Epithelial Cells, UA 07/18/2023 3-6 (A)  None Seen, 0-2 /HPF Final    Hyaline Casts, UA 07/18/2023 0-6  0 - 6 /LPF Final    Methodology 07/18/2023 Automated Microscopy   Final    Creatinine 07/18/2023 0.90  0.60 - 1.30 mg/dL Final    Serial Number: [id number]:  [id number]        No results found.    ASSESSMENT 82 years old gentleman with metastatic lung cancer    PLAN  1.  Metastatic right lower lobe lung cancer [id number] stage IVb:  A.  I reviewed the patient's chart including Drs. Notes blood results and imaging reports.  B.  I discussed the case with Dr. Johnson from the emergency room today over the phone to coordinate patient's care.  C.  I reviewed the patient's CT scan films myself as well as MRI spine.  I went over the pictures with the patient and his family.  My personal interpretation as above.  D.  I explained to the patient that if he wants to be aggressive we will have to do CT-guided biopsy of the right lower lobe lung mass.  This will be followed by systemic treatment based on histology.  Goal of treatment be palliative.  On the other hand we can focus on quality of life which I am in favor of given his advanced stage, age and poor performance status.  The patient is interested in best supportive care alone however he would like to discuss this further with his wife and other children.  E.  I do recommend radiation oncology consult for thigh radiation to the lumbar spine.  F.  I do recommend hospice care care consult after patient have discussion with his family.    2.  Cancer related pain:  A.  Recommend palliative care consult for now and possibly transitioning to hospice.    3.  Postobstructive right lower lobe pneumonia:  A.  The patient does have leukocytosis elevated lactic acid.  B.  Agree with IV antibiotics.    4.  Elevated alkaline phosphatase:  A.  Induced by bone metastases.    Herlinda Olivier MD    7/18/2023      Electronically  signed by Herlinda Olivier MD at 07/18/23 1728       Jazmine Jamison APRN at 07/19/23 0922        Consult Orders    1. Inpatient Palliative Care MD Consult [[id number]] ordered by Charis Iniguez APRN at 07/18/23 1609                 Palliative Care Initial Consult   Attending Physician: Maria E Silva DO  Referring Provider: PILO Victoria    Reason for Referral:  assistance with clarification of goals of care and pain    Code Status:   Code Status and Medical Interventions:   Ordered at: 07/18/23 1604     Medical Intervention Limits:    NO intubation (DNI)    NO cardioversion     Code Status (Patient has no pulse and is not breathing):    No CPR (Do Not Attempt to Resuscitate)     Medical Interventions (Patient has pulse or is breathing):    Limited Support      Advanced Directives: Advance Directive Status: Patient does not have advance directive   Family/Support: Michelle Bowden (spouse), Shellie Fowler (dtr)  Goals of Care: TBD.    HPI: Phuc Bowden is a 82 y.o. male with PMH significant for CAD s/p MI and CABG, PAG on warfarin, skin cancer, HTN, HLD, hearing loss, COPD, GERD, CHF. Patient presented to [facility] ED on 7/18 due to worsening back pain for past two months. CT of chest confirm RLL consolidation and probable mass, hypodense lesions in liver and osseous mets originally noted at time of June OSH admission a few weeks ago for weakness and pain. Patient with significant weight loss of 15 lbs, increasing debility now unable to stand without assistance. Work up revealed right sided PNA, ABX started. Oncology following with recommendation for radiation oncology consult for radiation to lumbar spine. Reports all treatment will be palliative. Palliative Care consulted for [facility] in the context of complex medical decision making and symptom management.    Patient with one dose Morphine 1mg IV overnight. Patient denies pain with no movement. However, with any movement in bed pain is elicited from left leg and  "right shoulder (previous rotator cuff tear). Patient is unsure if Morphine effective for pain. Son at bedside who reports they are considering a comfort focused plan of care and declined biopsy. Sister is expected to bedside later this morning as well as other family members. ROS limited by [place].     ROS: +pain, left leg and right shoulder, pain with movement. +decreased PO intake. +debility, increased weakness and pain limiting mobility. Denies N/V, diarrhea, constipation, anxiety. ROS limited by patient's hearing deficit.       Past Medical History:   Diagnosis Date    Anemia     Arthritis     Atrial fibrillation     Bowel obstruction     Bradycardia     CHF (congestive heart failure)     COPD (chronic obstructive pulmonary disease)     Coronary artery disease     GERD (gastroesophageal reflux disease)     Hearing loss     Hyperlipidemia     Injury of back     Myocardial infarction     Sciatica      Past Surgical History:   Procedure Laterality Date    CARDIAC SURGERY      2006    CORONARY ARTERY BYPASS GRAFT      EYE SURGERY      HEMORRHOIDECTOMY       Social History     Socioeconomic History    Marital status:    Tobacco Use    Smoking status: Former     Packs/day: 2.00     Years: 15.00     Pack years: 30.00     Types: Cigarettes    Smokeless tobacco: Never   Vaping Use    Vaping Use: Never used   Substance and Sexual Activity    Alcohol use: Never    Drug use: Never    Sexual activity: Defer     Family History   Problem Relation Age of Onset    Lung cancer Sister     Lung cancer Brother        Allergies   Allergen Reactions    Bee Venom Anaphylaxis       Current medication reviewed for route, type, dose and frequency and are current per MAR at time of dictation.    Palliative Performance Scale Score:  30%    /69 (BP Location: Left arm, Patient Position: Lying)   Pulse 114   Temp 98.1 °F (36.7 °C) (Oral)   Resp 18   Ht 177.8 cm (70\")   Wt 69.9 kg (154 lb)   SpO2 96%   BMI 22.10 kg/m² "     Intake/Output Summary (Last 24 hours) at 7/19/2023 0922  Last data filed at 7/19/2023 0542  Gross per 24 hour   Intake 650 ml   Output --   Net 650 ml       Physical Exam:    General Appearance:    Patient laying in bed, awake, alert, [place], frail, A/C will appearing, cooperative, NAD   HEENT:    NC/AT, EOMI, anicteric, MMM, face relaxed   Neck:   supple, trachea midline, no JVD   Lungs:     CTA bilat, diminished in bases; respirations regular, even and unlabored; RR 16-18 on exam    Heart:    RRR, normal S1 and S2, no M/R/G,  on monitor   Abdomen:     Normal bowel sounds, soft, nontender, nondistended   G/U:   Deferred   MSK/Extremities:   Wasting, no edema   Pulses:   Pulses palpable and equal bilaterally   Skin:   Warm, dry   Neurologic:   [place], A/Ox3, cooperative, WAGNER   Psych:   Calm, appropriate         Labs:   Results from last 7 days   Lab Units 07/19/23  0656   WBC 10*3/mm3 12.67*   HEMOGLOBIN g/dL 11.7*   HEMATOCRIT % 34.6*   PLATELETS 10*3/mm3 206     Results from last 7 days   Lab Units 07/19/23  0656   SODIUM mmol/L 137   POTASSIUM mmol/L 3.6   CHLORIDE mmol/L 102   CO2 mmol/L 18.0*   BUN mg/dL 15   CREATININE mg/dL 0.56*   GLUCOSE mg/dL 100*   CALCIUM mg/dL 8.9     Results from last 7 days   Lab Units 07/19/23  0656   SODIUM mmol/L 137   POTASSIUM mmol/L 3.6   CHLORIDE mmol/L 102   CO2 mmol/L 18.0*   BUN mg/dL 15   CREATININE mg/dL 0.56*   CALCIUM mg/dL 8.9   BILIRUBIN mg/dL 1.6*   ALK PHOS U/L 230*   ALT (SGPT) U/L 12   AST (SGOT) U/L 24   GLUCOSE mg/dL 100*     Imaging Results (Last 72 Hours)       Procedure Component Value Units Date/Time    MRI Lumbar Spine With & Without Contrast [[id number]] Collected: 07/18/23 1507     Updated: 07/18/23 1535    Narrative:        MRI LUMBAR SPINE W WO CONTRAST    Date of Exam: 7/18/2023 2:27 PM EDT    Indication: Low back pain.  Bilateral lower extremity weakness and decreased sensation.  Reported lesion in the lumbar spine from outside hospital..      Comparison: CT abdomen pelvis from June 7, 2023    Technique:  Routine multiplanar/multisequence sequence images of the lumbar spine were obtained before and after the uneventful administration of 14 mL Multihance.        Findings:  The alignment is anatomic. The vertebral body heights appear normal. There are multiple T1 hypointense enhancing lesions most prominent within the L3 vertebral body most compatible with metastasis. There is an associated mild compression deformity at L3,   likely a pathological fracture. No epidural spread of tumor is identified. There are degenerative endplate changes at L3-4 and L4-5. There is disc desiccation at all levels, with moderate degenerative loss of disc height at L4-5. The conus terminates at   the T12-L1 level. The posterior paravertebral soft tissues are unremarkable.    L1-2: Mild disc bulge. Mild bilateral facet arthropathy. No spinal canal stenosis. No neural foraminal stenosis.    L2-3: Mild disc bulge eccentric to the left. Mild right and moderate facet arthropathy with ligamentum flavum infolding. Mild spinal canal stenosis. Mild right and mild to moderate left neural foraminal stenosis.    L3-4: Mild disc bulge. Moderate bilateral facet arthropathy with ligamentum flavum infolding. Mild to moderate spinal canal stenosis. Mild to moderate right and moderate left neural foramen stenosis.    L4-5: Moderate disc bulge. Moderate bilateral facet arthropathy with ligamentum flavum infolding. Mild spinal canal stenosis. Moderate right and mild to moderate left neural foraminal stenosis.    L5-S1: Mild disc bulge eccentric to the right narrowing the right lateral recess. Moderate right and mild left facet arthropathy. No spinal canal stenosis. Moderate right and mild left neural foraminal stenosis.      Impression:      Impression:  1.Multiple enhancing lesions within lumbar spine most prominent at L3, most compatible with osseous metastasis. There is an associated mild  "compression deformity at L3, likely a pathological fracture.  2.Mild to moderate multilevel degenerative changes of lumbar spine as described above.        Electronically Signed: Ruddy Rodriguez    7/18/2023 3:32 PM EDT    Workstation ID: [id number]    CT Abdomen Pelvis With Contrast [[id number]] Collected: 07/18/23 1405     Updated: 07/18/23 1420    Narrative:      CT ABDOMEN PELVIS W CONTRAST    Date of Exam: 7/18/2023 1:08 PM EDT    Indication: Low back pain with reported lesion to the bar spine at outside 20.  Weight loss and poor appetite.  Admitted 1 month ago side hospital for \"abdominal infection \".    Comparison: June 7, 2023    Technique: Axial CT images were obtained of the abdomen and pelvis following the uneventful intravenous administration of 85 mL Isovue-370. Reconstructed coronal and sagittal images were also obtained. Automated exposure control and iterative   construction methods were used.      Findings:  Hypodense lesions within the liver appear increased in size and number. An index lesion within the left hepatic lobe on image 33 measures 3.6 x 3.1 cm, previously 1.1 x 1.0 cm. The gallbladder, adrenal glands, right kidney, spleen, and pancreas are   unremarkable. There are small left renal cyst.    The stomach appears normal. The small bowel appears normal in caliber and configuration. The colon appears normal. The appendix appears normal. There is no ascites or loculated collection. No abnormally enlarged lymph nodes are identified.    The rectum and urinary bladder are unremarkable. The prostate is enlarged measuring 4.8 cm in transverse dimension.    Several osseous metastases have increased in size, for example an index lesion involving the right L3 vertebral body measuring 4.9 cm, previously 3.2 cm. Another metastasis is seen within the left ischium.      Impression:      Impression:  1.Progressive hypodense lesions within liver and progressive osseous metastasis, compatible with disease " progression.  2.No acute process identified within abdomen/pelvis.  3.Enlarged prostate.        Electronically Signed: Ruddy Michael    7/18/2023 2:17 PM EDT    Workstation ID: [id number]    CT Angiogram Chest [[id number]] Collected: 07/18/23 1340     Updated: 07/18/23 1409    Narrative:      CT ANGIOGRAM CHEST    Date of Exam: 7/18/2023 1:08 PM EDT    Indication: Per family, oxygen level 80% this morning.  Weight loss over the past 1 month.  Possible malignancy..    Comparison: None available.    Technique: Axial pulmonary arterial phase IV contrast enhanced CT angiogram of the chest per PE protocol. Two-dimensional reconstructions were postprocessed. Axial IV contrast-enhanced CT of the abdomen and pelvis with multiplanar reconstruction.    Findings:  CTA chest: There is no pathologic axillary adenopathy or other worrisome body wall soft tissue finding in the chest. Trace pleural effusion is present on the right. There is no pericardial effusion. Atherosclerotic, nonaneurysmal thoracic aorta. The   pulmonary arteries are patent and well opacified, without evidence of filling defect concerning for acute pulmonary embolus. There is no distinct pathologic mediastinal adenopathy. Evaluation of the osseous structures demonstrates no evidence of acute   fracture or aggressive osseous lesion. Evaluation of the lung fields demonstrates fairly extensive confluent consolidation in the right lower lobe, with some evidence of endobronchial obstruction, with underlying mass not entirely excluded. The remaining   lung fields demonstrate moderate emphysema and mild diffuse peribronchial groundglass opacity, suggesting component of bronchiolitis.      Impression:      Impression:  Prominent consolidation is noted in the right lower lobe, somewhat masslike with evidence of endobronchial obstruction, concerning for underlying possible primary or metastatic neoplasm. There is otherwise no suspicious thoracic adenopathy.    Diffuse  areas of centrilobular groundglass opacity are present, suggesting component of bronchiolitis. No additional acute findings are present. There is no acute pulmonary embolus.        Electronically Signed: Eder Mireles    7/18/2023 2:06 PM EDT    Workstation ID: [id number]                  Diagnostics: Reviewed    A:   Cancer associated pain    Right lower lobe lung mass    Metastasis    Coronary artery disease with history of myocardial infarction without history of CABG    COPD (chronic obstructive pulmonary disease)    Essential hypertension    Hyperlipidemia    Functional assessment declined    PAF (paroxysmal atrial fibrillation)    Pneumonia of right lung due to infectious organism    Supratherapeutic INR    Lactic acidosis    Leukocytosis    CHF (congestive heart failure)     82 y.o. 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 Debility -due to disease progression, PT/OT unlikely to improve debility    3. Decreased PO intake -offer frequent snacks and supplements  -ordered Boost Plus vanilla or strawberry per patient preference  -SLP evaluation with regular texture, thin liquids    4. Constipation -prevention  -continue bowel regimen    5. GOC -DNR/DNI -per review of chart  -patient/family considering comfort focused plan of care  -hospice consult placed for further information  -Radiation Oncology consult pending for palliative radiation    P: Introduced Palliative Care and services to patient and son at bedside. ROS limited by [place]. Family and patient considering comfort focused plan of care, they are agreeable to hospice consult for further information. Discussed patient's symptoms. Bone pain  limiting patient's functioning, adjunct medications limited due to elevated INR and risk of bleeding. Will adjust opioids at this time. All questions and concerns addressed.   Thank you for this consult and allowing us to participate in patient's plan of care. Palliative Care Team will continue to follow patient. Please do not hesitate to contact us regarding further symptom management or goals of care needs.  Time: 45 minutes spent reviewing medical and medication records, assessing and examining patient, discussing with family, answering questions, providing some guidance about a plan and documentation of care, and coordinating care with other healthcare members, with > 50% time spent face to face.         PILO Lorenzo  2023      Electronically signed by Jazmine Jamison APRN at 23 1041       Rubio Kowalski MD at 23 1145        Consult Orders    1. Inpatient Radiation Oncology Consult [[id number]] ordered by Vy Alston MD at 23 1834                 CONSULTATION NOTE    NAME:      Phuc Bowden  :                                                          1940  DATE OF CONSULTATION:                       2023   REQUESTING PHYSICIAN:                   No ref. provider found  REASON FOR CONSULTATION:             1. 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 Right lower lobe lung mass    3. Bronchiolitis    4. Acute respiratory failure with hypoxia    5. Generalized abdominal pain    6. Acute midline low back pain without sciatica    7. Cancer, metastatic to liver    8. 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 Elevated INR    10. Elevated lactic acid level    11. Elevated brain natriuretic peptide (BNP) level    12. Elevated bilirubin       Thank you for requesting my services in evaluation of this pleasant individual.  I am seeing them in inpatient consultation regarding a diagnosis of presumed metastatic  lung cancer with metastases to the spine and pathologic compression fracture of L3.  Subjective   BRIEF HISTORY:  Phuc Bowden  is a very pleasant 82 y.o. male with multiple medical comorbidities including COPD, tobacco use, and coronary artery disease, who presented 1 month ago after sustaining a fall from a ladder where it was presumed he tore a rotator cuff in the right shoulder.  He completed imaging at the time showing a large mass in the right lower lobe of the lung, bilateral pleural effusions, hyperdense lesions in the liver, and multiple bony lesions compatible with metastases from a presumed right lower lobe lung cancer.  He has had a long workup with some delays and as of yet, he still has not had any tissue obtained.  He was admitted to [facility] through the ED yesterday due to progressive low back pain and difficulty ambulating.  New imaging was obtained showing progressive disease in the lumbar spine, most notably at L2-3 with compression fracture of L3.  Most of the history is obtained today through his son.  He reports tremendous difficulty with ambulation due to pain, noted to be in the low back and left hip.  They deny any CNS complaints of headaches, nausea, or other focal issues.  He has not had dedicated CNS imaging.      Allergies   Allergen Reactions    Bee Venom Anaphylaxis       Social History     Tobacco Use    Smoking status: Former     Packs/day: 2.00     Years: 15.00     Pack years: 30.00     Types: Cigarettes    Smokeless tobacco: Never   Vaping Use    Vaping Use: Never used   Substance Use Topics    Alcohol use: Never    Drug use: Never       Past Medical History:   Diagnosis Date    Anemia     Arthritis     Atrial fibrillation     Bowel obstruction     Bradycardia     CHF (congestive heart failure)     COPD (chronic obstructive pulmonary disease)     Coronary artery disease     GERD (gastroesophageal reflux disease)     Hearing loss     Hyperlipidemia     Injury of back     Myocardial  infarction     Sciatica        family history includes Lung cancer in his brother and sister.     Past Surgical History:   Procedure Laterality Date    CARDIAC SURGERY      2006    CORONARY ARTERY BYPASS GRAFT      EYE SURGERY      HEMORRHOIDECTOMY          Review of Systems   Constitutional:  Positive for appetite change, fatigue and unexpected weight change.   HENT:  Negative.     Eyes: Negative.    Respiratory:  Positive for cough and shortness of breath. Negative for hemoptysis.    Cardiovascular: Negative.    Gastrointestinal: Negative.    Endocrine: Negative.    Genitourinary: Negative.     Musculoskeletal:  Positive for back pain.   Skin: Negative.    Neurological:  Positive for extremity weakness.   Hematological:  Bruises/bleeds easily.   Psychiatric/Behavioral: Negative.          Objective   VITAL SIGNS:   Vitals:    07/19/23 0402 07/19/23 0600 07/19/23 0742 07/19/23 0958   BP:   107/69 111/75   BP Location:   Left arm    Patient Position:   Lying    Pulse: 114 114  114   Resp:   18    Temp:   98.1 °F (36.7 °C)    TempSrc:   Oral    SpO2: 96% 96%     Weight:       Height:                      KPS 60    Physical Exam  Vitals and nursing note reviewed.   Constitutional:       General: He is not in acute distress.     Appearance: He is well-developed.      Comments: Resting in hospital bed.  No distress.  With position changes, he winces in pain.  Reports discomfort coming from his back and left hip.   HENT:      Head: Normocephalic and atraumatic.   Eyes:      Conjunctiva/sclera: Conjunctivae normal.      Pupils: Pupils are equal, round, and reactive to light.   Cardiovascular:      Rate and Rhythm: Normal rate and regular rhythm.      Heart sounds: No murmur heard.    No friction rub.   Pulmonary:      Effort: Pulmonary effort is normal.      Breath sounds: Normal breath sounds. No wheezing.      Comments: Coarse breath sounds predominantly in the right base  Abdominal:      General: Bowel sounds are  "normal. There is no distension.      Palpations: Abdomen is soft. There is no mass.      Tenderness: There is no abdominal tenderness.   Musculoskeletal:         General: Normal range of motion.      Cervical back: Normal range of motion and neck supple.      Comments: Pain described in the lumbar spine and left lateral hip   Lymphadenopathy:      Cervical: No cervical adenopathy.   Skin:     General: Skin is warm and dry.   Neurological:      General: No focal deficit present.      Mental Status: He is alert and oriented to person, place, and time.      Comments: Restricts movement of the left leg, which appears to be due to pain   Psychiatric:         Behavior: Behavior normal.         Thought Content: Thought content normal.         Judgment: Judgment normal.     IMAGING  I have personally reviewed the relevant imaging studies, as follows:  CT Abdomen Pelvis With Contrast    Result Date: 7/18/2023  CT ABDOMEN PELVIS W CONTRAST Date of Exam: 7/18/2023 1:08 PM EDT Indication: Low back pain with reported lesion to the bar spine at outside 20.  Weight loss and poor appetite.  Admitted 1 month ago side hospital for \"abdominal infection \". Comparison: June 7, 2023 Technique: Axial CT images were obtained of the abdomen and pelvis following the uneventful intravenous administration of 85 mL Isovue-370. Reconstructed coronal and sagittal images were also obtained. Automated exposure control and iterative construction methods were used. Findings: Hypodense lesions within the liver appear increased in size and number. An index lesion within the left hepatic lobe on image 33 measures 3.6 x 3.1 cm, previously 1.1 x 1.0 cm. The gallbladder, adrenal glands, right kidney, spleen, and pancreas are unremarkable. There are small left renal cyst. The stomach appears normal. The small bowel appears normal in caliber and configuration. The colon appears normal. The appendix appears normal. There is no ascites or loculated collection. " No abnormally enlarged lymph nodes are identified. The rectum and urinary bladder are unremarkable. The prostate is enlarged measuring 4.8 cm in transverse dimension. Several osseous metastases have increased in size, for example an index lesion involving the right L3 vertebral body measuring 4.9 cm, previously 3.2 cm. Another metastasis is seen within the left ischium.     Impression: 1.Progressive hypodense lesions within liver and progressive osseous metastasis, compatible with disease progression. 2.No acute process identified within abdomen/pelvis. 3.Enlarged prostate. Electronically Signed: Ruddy Rodriguez  7/18/2023 2:17 PM EDT  Workstation ID: [id number]    CT Angiogram Chest    Result Date: 7/18/2023  CT ANGIOGRAM CHEST Date of Exam: 7/18/2023 1:08 PM EDT Indication: Per family, oxygen level 80% this morning.  Weight loss over the past 1 month.  Possible malignancy.. Comparison: None available. Technique: Axial pulmonary arterial phase IV contrast enhanced CT angiogram of the chest per PE protocol. Two-dimensional reconstructions were postprocessed. Axial IV contrast-enhanced CT of the abdomen and pelvis with multiplanar reconstruction. Findings: CTA chest: There is no pathologic axillary adenopathy or other worrisome body wall soft tissue finding in the chest. Trace pleural effusion is present on the right. There is no pericardial effusion. Atherosclerotic, nonaneurysmal thoracic aorta. The pulmonary arteries are patent and well opacified, without evidence of filling defect concerning for acute pulmonary embolus. There is no distinct pathologic mediastinal adenopathy. Evaluation of the osseous structures demonstrates no evidence of acute fracture or aggressive osseous lesion. Evaluation of the lung fields demonstrates fairly extensive confluent consolidation in the right lower lobe, with some evidence of endobronchial obstruction, with underlying mass not entirely excluded. The remaining  lung fields  demonstrate moderate emphysema and mild diffuse peribronchial groundglass opacity, suggesting component of bronchiolitis.     Impression: Prominent consolidation is noted in the right lower lobe, somewhat masslike with evidence of endobronchial obstruction, concerning for underlying possible primary or metastatic neoplasm. There is otherwise no suspicious thoracic adenopathy. Diffuse areas of centrilobular groundglass opacity are present, suggesting component of bronchiolitis. No additional acute findings are present. There is no acute pulmonary embolus. Electronically Signed: Eder Mireles  7/18/2023 2:06 PM EDT  Workstation ID: [id number]    MRI Lumbar Spine With & Without Contrast    Result Date: 7/18/2023  MRI LUMBAR SPINE W WO CONTRAST Date of Exam: 7/18/2023 2:27 PM EDT Indication: Low back pain.  Bilateral lower extremity weakness and decreased sensation.  Reported lesion in the lumbar spine from outside hospital..  Comparison: CT abdomen pelvis from June 7, 2023 Technique:  Routine multiplanar/multisequence sequence images of the lumbar spine were obtained before and after the uneventful administration of 14 mL Multihance.  Findings: The alignment is anatomic. The vertebral body heights appear normal. There are multiple T1 hypointense enhancing lesions most prominent within the L3 vertebral body most compatible with metastasis. There is an associated mild compression deformity at L3,  likely a pathological fracture. No epidural spread of tumor is identified. There are degenerative endplate changes at L3-4 and L4-5. There is disc desiccation at all levels, with moderate degenerative loss of disc height at L4-5. The conus terminates at  the T12-L1 level. The posterior paravertebral soft tissues are unremarkable. L1-2: Mild disc bulge. Mild bilateral facet arthropathy. No spinal canal stenosis. No neural foraminal stenosis. L2-3: Mild disc bulge eccentric to the left. Mild right and moderate facet arthropathy  with ligamentum flavum infolding. Mild spinal canal stenosis. Mild right and mild to moderate left neural foraminal stenosis. L3-4: Mild disc bulge. Moderate bilateral facet arthropathy with ligamentum flavum infolding. Mild to moderate spinal canal stenosis. Mild to moderate right and moderate left neural foramen stenosis. L4-5: Moderate disc bulge. Moderate bilateral facet arthropathy with ligamentum flavum infolding. Mild spinal canal stenosis. Moderate right and mild to moderate left neural foraminal stenosis. L5-S1: Mild disc bulge eccentric to the right narrowing the right lateral recess. Moderate right and mild left facet arthropathy. No spinal canal stenosis. Moderate right and mild left neural foraminal stenosis.     Impression: 1.Multiple enhancing lesions within lumbar spine most prominent at L3, most compatible with osseous metastasis. There is an associated mild compression deformity at L3, likely a pathological fracture. 2.Mild to moderate multilevel degenerative changes of lumbar spine as described above. Electronically Signed: Ruddy Rodriguez  7/18/2023 3:32 PM EDT  Workstation ID: [id number]         The following portions of the patient's history were reviewed and updated as appropriate: allergies, current medications, past family history, past medical history, past social history, past surgical history, and problem list.    Assessment      IMPRESSION:  Mr. Bowden is an 82 year old gentleman with multiple medical comorbidities, who presents now with intractable pain due to metastatic disease to L2-3 causing compression fraction.  He does not appear to have any clear evidence of spinal cord impingement, but with the location of the lesion and resulting injury, I believe the metastasis in the L-spine is primary source of his pain, as well as the left ischium lesion which is likely contributing to his discomfort when he tries to ambulate.    The patient and his family have been discussing goals of care,  and at this point, they have declined obtaining a biopsy and are electing to focus on palliative care.  Without a tissue diagnosis, his scans really point to a clinical diagnosis of lung cancer.  I think radiation will be helpful to alleviate his pain, and what I recommend is 8 Gy in a single fraction to L2-3 and also the left ischium.  They would like to discuss more amongst the family before making their decision.  I reiterated that the goal would be to reduce his pain and hopefully improve his quality of life.  Radiation to these 2 areas would have very little risk, but would also not treat the other areas of disease in the lungs, liver, etc.      Once they make their decision, and it they would like to pursue radiation treatments, then I can coordinate for him to undergo a radiation setup and simulation session and we will aim to have both of these areas treated in a single fraction.    RECOMMENDATIONS:  Recommend palliative radiation to L2-3 and the left ischium - 8 Gy in a single treatment.        Rubio Kowalski MD           Electronically signed by Rubio Kowalski MD at 07/19/23 1437       Alaina Maria at 07/19/23 1607        Consult Orders    1. Spiritual Care Consult [[id number]] ordered by Yuliya Thomas MD at 07/19/23 0713                  visit per palliative care.  Patient sleeping, talked with son at bedside.  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  Patient's spouse more involved in [employer], patient's daughter has been the one who lets their  know of patient's hospitalizations/ visits.  Family is in process of making decision re. GOC; spouse, 3 daughters, 1 son.  Invited son to have RN call  for patient and/or family emotional, spiritual support.      Electronically signed by Alaina Maria at 07/19/23 5387

## 2023-07-19 NOTE — PLAN OF CARE
Goal Outcome Evaluation:  Plan of Care Reviewed With: patient, daughter, son           Outcome Evaluation: Had radiation today. Starting to hallucinate and get agitated.  VSS. Given PRN pain med for c/o pain. Plan for possible discharge home on Friday. Will continue to monitor. Call light within reach. Family at bedside. Continue POC.

## 2023-07-19 NOTE — PROGRESS NOTES
[facility] Medicine Services  PROGRESS NOTE    Patient Name: Phuc Bowden  : 1940  MRN: [id number]    Date of Admission: 2023  Primary Care Physician: Blayne Adan MD    Subjective   Subjective     CC:  Lower extremity pain    HPI:  Patient with continued pain overnight.  Difficulty rolling due to pain.  States that his right shoulder hurts due to rotator cuff tear.  Son at bedside and states they have declined biopsy.    ROS:  Gen- No fevers, chills  CV- No chest pain, palpitations  Resp- No cough, dyspnea  GI- No N/V/D, abd pain    Objective   Objective     Vital Signs:   Temp:  [97.5 °F (36.4 °C)-99.2 °F (37.3 °C)] 98.1 °F (36.7 °C)  Heart Rate:  [] 114  Resp:  [16-20] 18  BP: (103-147)/(57-98) 111/75     Physical Exam:  Constitutional: No acute distress, awake, alert  HENT: NCAT, mucous membranes moist; hard of hearing   Respiratory: diminished; poor effort  Cardiovascular: tachy, irregular, no murmurs, rubs, or gallops  Gastrointestinal: Positive bowel sounds, soft, nontender, nondistended  Musculoskeletal: No bilateral ankle edema  Psychiatric: Appropriate affect, cooperative  Neurologic: Oriented x 3, strength symmetric in all extremities, Cranial Nerves grossly intact to confrontation, speech clear  Skin: No rashes      Results Reviewed:  LAB RESULTS:      Lab 23  0656 23  1534 23  1331 23  1251   WBC 12.67*  --   --  16.55*   HEMOGLOBIN 11.7*  --   --  13.7   HEMATOCRIT 34.6*  --   --  42.1   PLATELETS 206  --   --  284   NEUTROS ABS 10.63*  --   --  13.99*   IMMATURE GRANS (ABS) 0.16*  --   --  0.42*   LYMPHS ABS 0.78  --   --  0.87   MONOS ABS 1.07*  --   --  1.21*   EOS ABS 0.00  --   --  0.01   MCV 87.8  --   --  90.0   PROCALCITONIN  --   --  0.27*  --    LACTATE  --  1.4  --  2.6*   PROTIME 35.3*  --   --  53.3*         Lab 23  0656 23  1331 23  1253   SODIUM 137 136  --    POTASSIUM 3.6 4.1  --    CHLORIDE  102 97*  --    CO2 18.0* 22.0  --    ANION GAP 17.0* 17.0*  --    BUN 15 17  --    CREATININE 0.56* 0.98 0.90   EGFR 99.0 77.5  --    GLUCOSE 100* 115*  --    CALCIUM 8.9 10.3  --    MAGNESIUM 1.8  --   --          Lab 07/19/23  0656 07/18/23  1331   TOTAL PROTEIN 5.7* 7.7   ALBUMIN 2.9* 3.6   GLOBULIN 2.8 4.1   ALT (SGPT) 12 16   AST (SGOT) 24 28   BILIRUBIN 1.6* 2.0*   ALK PHOS 230* 270*         Lab 07/19/23  0656 07/18/23  1331 07/18/23  1251   PROBNP  --  11,337.0*  --    HSTROP T  --  21*  --    PROTIME 35.3*  --  53.3*   INR 3.49*  --  5.91*                 Brief Urine Lab Results  (Last result in the past 365 days)        Color   Clarity   Blood   Leuk Est   Nitrite   Protein   CREAT   Urine HCG        07/18/23 1548 Yellow   Clear   Trace   Negative   Negative   Trace                   Microbiology Results Abnormal       Procedure Component Value - Date/Time    MRSA Screen, PCR (Inpatient) - Swab, Nares [[id number]]  (Normal) Collected: 07/19/23 0542    Lab Status: Final result Specimen: Swab from Nares Updated: 07/19/23 0840     MRSA PCR Negative    Narrative:      The negative predictive value of this diagnostic test is high and should only be used to consider de-escalating anti-MRSA therapy. A positive result may indicate colonization with MRSA and must be correlated clinically.  MRSA Negative    S. Pneumo Ag Urine or CSF - Urine, Urine, Clean Catch [[id number]]  (Normal) Collected: 07/18/23 1548    Lab Status: Final result Specimen: Urine, Clean Catch Updated: 07/19/23 0117     Strep Pneumo Ag Negative    Legionella Antigen, Urine - Urine, Urine, Clean Catch [[id number]]  (Normal) Collected: 07/18/23 1548    Lab Status: Final result Specimen: Urine, Clean Catch Updated: 07/19/23 0117     LEGIONELLA ANTIGEN, URINE Negative    Respiratory Panel PCR w/COVID-19(SARS-CoV-2) CASSIDY/TAMELA/ARTURO/PAD/COR/MAD/GRETCHEN In-House, NP Swab in UT/VTM, 3-4 HR TAT - Swab, Nasopharynx [[id number]]  (Normal) Collected: 07/18/23 3686     "Lab Status: Final result Specimen: Swab from Nasopharynx Updated: 07/18/23 1716     ADENOVIRUS, PCR Not Detected     Coronavirus 229E Not Detected     Coronavirus HKU1 Not Detected     Coronavirus NL63 Not Detected     Coronavirus OC43 Not Detected     COVID19 Not Detected     Human Metapneumovirus Not Detected     Human Rhinovirus/Enterovirus Not Detected     Influenza A PCR Not Detected     Influenza B PCR Not Detected     Parainfluenza Virus 1 Not Detected     Parainfluenza Virus 2 Not Detected     Parainfluenza Virus 3 Not Detected     Parainfluenza Virus 4 Not Detected     RSV, PCR Not Detected     Bordetella pertussis pcr Not Detected     Bordetella parapertussis PCR Not Detected     Chlamydophila pneumoniae PCR Not Detected     Mycoplasma pneumo by PCR Not Detected    Narrative:      In the setting of a positive respiratory panel with a viral infection PLUS a negative procalcitonin without other underlying concern for bacterial infection, consider observing off antibiotics or discontinuation of antibiotics and continue supportive care. If the respiratory panel is positive for atypical bacterial infection (Bordetella pertussis, Chlamydophila pneumoniae, or Mycoplasma pneumoniae), consider antibiotic de-escalation to target atypical bacterial infection.            CT Abdomen Pelvis With Contrast    Result Date: 7/18/2023  CT ABDOMEN PELVIS W CONTRAST Date of Exam: 7/18/2023 1:08 PM EDT Indication: Low back pain with reported lesion to the bar spine at outside 20.  Weight loss and poor appetite.  Admitted 1 month ago side hospital for \"abdominal infection \". Comparison: June 7, 2023 Technique: Axial CT images were obtained of the abdomen and pelvis following the uneventful intravenous administration of 85 mL Isovue-370. Reconstructed coronal and sagittal images were also obtained. Automated exposure control and iterative construction methods were used. Findings: Hypodense lesions within the liver appear " increased in size and number. An index lesion within the left hepatic lobe on image 33 measures 3.6 x 3.1 cm, previously 1.1 x 1.0 cm. The gallbladder, adrenal glands, right kidney, spleen, and pancreas are unremarkable. There are small left renal cyst. The stomach appears normal. The small bowel appears normal in caliber and configuration. The colon appears normal. The appendix appears normal. There is no ascites or loculated collection. No abnormally enlarged lymph nodes are identified. The rectum and urinary bladder are unremarkable. The prostate is enlarged measuring 4.8 cm in transverse dimension. Several osseous metastases have increased in size, for example an index lesion involving the right L3 vertebral body measuring 4.9 cm, previously 3.2 cm. Another metastasis is seen within the left ischium.     Impression: Impression: 1.Progressive hypodense lesions within liver and progressive osseous metastasis, compatible with disease progression. 2.No acute process identified within abdomen/pelvis. 3.Enlarged prostate. Electronically Signed: Ruddy Rodriguez  7/18/2023 2:17 PM EDT  Workstation ID: [id number]    CT Angiogram Chest    Result Date: 7/18/2023  CT ANGIOGRAM CHEST Date of Exam: 7/18/2023 1:08 PM EDT Indication: Per family, oxygen level 80% this morning.  Weight loss over the past 1 month.  Possible malignancy.. Comparison: None available. Technique: Axial pulmonary arterial phase IV contrast enhanced CT angiogram of the chest per PE protocol. Two-dimensional reconstructions were postprocessed. Axial IV contrast-enhanced CT of the abdomen and pelvis with multiplanar reconstruction. Findings: CTA chest: There is no pathologic axillary adenopathy or other worrisome body wall soft tissue finding in the chest. Trace pleural effusion is present on the right. There is no pericardial effusion. Atherosclerotic, nonaneurysmal thoracic aorta. The pulmonary arteries are patent and well opacified, without evidence of  filling defect concerning for acute pulmonary embolus. There is no distinct pathologic mediastinal adenopathy. Evaluation of the osseous structures demonstrates no evidence of acute fracture or aggressive osseous lesion. Evaluation of the lung fields demonstrates fairly extensive confluent consolidation in the right lower lobe, with some evidence of endobronchial obstruction, with underlying mass not entirely excluded. The remaining  lung fields demonstrate moderate emphysema and mild diffuse peribronchial groundglass opacity, suggesting component of bronchiolitis.     Impression: Impression: Prominent consolidation is noted in the right lower lobe, somewhat masslike with evidence of endobronchial obstruction, concerning for underlying possible primary or metastatic neoplasm. There is otherwise no suspicious thoracic adenopathy. Diffuse areas of centrilobular groundglass opacity are present, suggesting component of bronchiolitis. No additional acute findings are present. There is no acute pulmonary embolus. Electronically Signed: Eder Mireles  7/18/2023 2:06 PM EDT  Workstation ID: [id number]    MRI Lumbar Spine With & Without Contrast    Result Date: 7/18/2023  MRI LUMBAR SPINE W WO CONTRAST Date of Exam: 7/18/2023 2:27 PM EDT Indication: Low back pain.  Bilateral lower extremity weakness and decreased sensation.  Reported lesion in the lumbar spine from outside hospital..  Comparison: CT abdomen pelvis from June 7, 2023 Technique:  Routine multiplanar/multisequence sequence images of the lumbar spine were obtained before and after the uneventful administration of 14 mL Multihance.  Findings: The alignment is anatomic. The vertebral body heights appear normal. There are multiple T1 hypointense enhancing lesions most prominent within the L3 vertebral body most compatible with metastasis. There is an associated mild compression deformity at L3,  likely a pathological fracture. No epidural spread of tumor is  identified. There are degenerative endplate changes at L3-4 and L4-5. There is disc desiccation at all levels, with moderate degenerative loss of disc height at L4-5. The conus terminates at  the T12-L1 level. The posterior paravertebral soft tissues are unremarkable. L1-2: Mild disc bulge. Mild bilateral facet arthropathy. No spinal canal stenosis. No neural foraminal stenosis. L2-3: Mild disc bulge eccentric to the left. Mild right and moderate facet arthropathy with ligamentum flavum infolding. Mild spinal canal stenosis. Mild right and mild to moderate left neural foraminal stenosis. L3-4: Mild disc bulge. Moderate bilateral facet arthropathy with ligamentum flavum infolding. Mild to moderate spinal canal stenosis. Mild to moderate right and moderate left neural foramen stenosis. L4-5: Moderate disc bulge. Moderate bilateral facet arthropathy with ligamentum flavum infolding. Mild spinal canal stenosis. Moderate right and mild to moderate left neural foraminal stenosis. L5-S1: Mild disc bulge eccentric to the right narrowing the right lateral recess. Moderate right and mild left facet arthropathy. No spinal canal stenosis. Moderate right and mild left neural foraminal stenosis.     Impression: Impression: 1.Multiple enhancing lesions within lumbar spine most prominent at L3, most compatible with osseous metastasis. There is an associated mild compression deformity at L3, likely a pathological fracture. 2.Mild to moderate multilevel degenerative changes of lumbar spine as described above. Electronically Signed: Ruddy Rodriguez  7/18/2023 3:32 PM EDT  Workstation ID: [id number]         Current medications:  Scheduled Meds:cefTRIAXone, 1,000 mg, Intravenous, Q24H  dilTIAZem CD, 120 mg, Oral, Daily  doxycycline, 100 mg, Oral, Q12H  famotidine, 20 mg, Oral, BID AC  folic acid, 1 mg, Oral, Daily  lactobacillus acidophilus, 1 capsule, Oral, Daily  losartan, 25 mg, Oral, Daily  metoprolol tartrate, 25 mg, Oral,  Q12H  potassium chloride, 10 mEq, Intravenous, Q1H  pravastatin, 40 mg, Oral, Nightly  senna-docusate sodium, 2 tablet, Oral, BID  sodium chloride, 10 mL, Intravenous, Q12H  warfarin (COUMADIN) (dosing per levels), , Does not apply, Daily      Continuous Infusions:Pharmacy to dose warfarin,       PRN Meds:.  acetaminophen    aluminum-magnesium hydroxide-simethicone    senna-docusate sodium **AND** polyethylene glycol **AND** bisacodyl **AND** bisacodyl    Calcium Replacement - Follow Nurse / BPA Driven Protocol    Magnesium Standard Dose Replacement - Follow Nurse / BPA Driven Protocol    melatonin    Morphine **AND** naloxone    ondansetron    oxyCODONE-acetaminophen    Pharmacy to dose warfarin    Phosphorus Replacement - Follow Nurse / BPA Driven Protocol    Potassium Replacement - Follow Nurse / BPA Driven Protocol    sodium chloride    sodium chloride    sodium chloride    Assessment & Plan   Assessment & Plan     Active Hospital Problems    Diagnosis  POA    **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]  Yes    Right lower lobe lung mass [R91.8]  Unknown    Metastasis [C79.9]  Unknown    Coronary artery disease with history of myocardial infarction without history of CABG [I25.10, I25.2]  Not Applicable    COPD (chronic obstructive pulmonary disease) [J44.9]  Unknown    Essential hypertension [I10]  Unknown    Hyperlipidemia [E78.5]  Unknown    Functional assessment declined [Z53.20]  Not Applicable    PAF (paroxysmal atrial fibrillation) [I48.0]  Unknown    Pneumonia of right lung due to infectious organism [J18.9]  Unknown    Supratherapeutic INR [R79.1]  Unknown    Lactic acidosis [E87.20]  Unknown    Leukocytosis [D72.829]  Unknown    CHF (congestive heart failure) [I50.9]  Unknown      Resolved Hospital Problems   No resolved problems to display.        Brief Hospital Course to date:  Phuc Bowden is a 82 y.o. male with pmh significant for CAD s/p MI and CABG, PAF on warfarin, skin cancer, HTN, HLD, hearing loss,  previous tobacco use long term with COPD, GERD, presents with worsening low back pain x 2 months.  Patient had a recent fall and evaluation at that time showed concerns for bony mets.  Imaging also with liver mets as well as a right lung mass.  Patient was to have a biopsy at  however this was not done due to warfarin.  He has had progressive pain and weakness prompting evaluation.    Intractable Back Pain with functional decline/ FTT  Right lung mass with liver/ spinal mets  -- recent discovery of lung mass in June upon OSH ED with pending work up [facility], however pain and debility increasing  -Oncology consulted.  Reviewed treatment be palliative in nature.  Family has opted to not undergo biopsy of right lung mass.  -Radiation oncology consult  -Palliative care consult  -- start oxycodone and morphine prn pain (failed tramadol and norco 10/325 outpatient).  -- PT/OT/ nutrition assessment     Right sided pneumonia, post obstructive  Leukocytosis, lactic acidosis  -- zosyn and vanc started in ED.  -Blood cultures pending.  Strep and Legionella negative.  MRSA negative.  Respiratory panel negative.  -Procalcitonin elevated with leukocytosis.  -Changed from Zosyn to Rocephin Doxy.     PAF  Supratherapeutic INR  -- hold warfarin, pharmacy consulted  -- vitamin K for INR 5.9; no evidence of bleeding  -- continue metoprolol and cardizem     HTN  HLD  CAD s/p CABG  CHF  -- continue losartan, metoprolol, pravastatin    Expected Discharge Location and Transportation: Likely home  Expected Discharge   Expected Discharge Date: 7/25/2023; Expected Discharge Time:      DVT prophylaxis:  Medical DVT prophylaxis orders are present.          CODE STATUS:   Code Status and Medical Interventions:   Ordered at: 07/18/23 1604     Medical Intervention Limits:    NO intubation (DNI)    NO cardioversion     Code Status (Patient has no pulse and is not breathing):    No CPR (Do Not Attempt to Resuscitate)     Medical  Interventions (Patient has pulse or is breathing):    Limited Support       Maria E Silva DO  07/19/23

## 2023-07-19 NOTE — CONSULTS
Continued Stay Note  [facility]     Patient Name: Phuc Bowden  MRN: [id number]  Today's Date: 7/19/2023    Admit Date: 7/18/2023    Plan: Home with [facility]   Discharge Plan       Row Name 07/19/23 1759       Plan    Plan Home with [facility]    Plan Comments Hospice referral received, chart reviewed. Visit made to pt. pt being transported to radiation for palliative radiation, pt's daughter Shellie present. Offered to return when pt was back in the room, Shellie stated prefers to talk while pt is out of the room. Teaching done on hospice philosophy, goals of care and services-team member visits, equipment, medications, supplies. Shellie stated does want hospice services for pt, though does not want to say hospice in front of the pt. Goal is to focus on comfort measures, pt will not be receiving any further aggressive treatment. Discussed equipment needs-bed, overbed table, BSC, transport w/c with a cushion, shower chair. Hospice will deliver the equipment to the home Friday. Shellie stated can transport pt home via private vehicle, asking to discharge Friday, 7/21, afternoon. Message sent to pt's care team regarding discharge. Will continue to follow. Please call [phone] if can be of further assistance.                   Discharge Codes    No documentation.                 Expected Discharge Date and Time       Expected Discharge Date Expected Discharge Time    Jul 25, 2023               Socorro Ruiz RN

## 2023-07-19 NOTE — PLAN OF CARE
Goal Outcome Evaluation:  Plan of Care Reviewed With: patient, daughter, son        Progress: no change  Outcome Evaluation: Palliative consult for GOC/ACP and pain management. Meds adjusted; encourage pt to request PO prn pain med, monitor response. Hospice consulted; home with hospice, possibly this Friday, July 21. Receiving palliative XRT today. Palliative following for management of symptoms/pain, support to pt and family in GOC/POC discussion.    1300 Palliative IDT meeting:  MD, APRN, RN, SW,   After hours, weekends and holidays, contact Palliative Provider by calling [phone]     Problem: Palliative Care  Goal: Enhanced Quality of Life  Outcome: Ongoing, Progressing  Intervention: Promote Advance Care Planning  Flowsheets (Taken 7/19/2023 1541)  Life Transition/Adjustment: (Hospice consult)   decision-making facilitated   palliative care discussed   palliative care initiated   other (see comments)  Intervention: Maximize Comfort  Flowsheets (Taken 7/19/2023 1541)  Pain Management Interventions: (encourage to ask for prn PO pain medication, monitor response)   pain management plan reviewed with patient/caregiver   other (see comments)  Intervention: Optimize Function  Flowsheets (Taken 7/19/2023 1541)  Fatigue Management:   frequent rest breaks encouraged   paced activity encouraged  Sleep/Rest Enhancement:   consistent schedule promoted   family presence promoted  Intervention: Optimize Psychosocial Wellbeing  Flowsheets (Taken 7/19/2023 1541)  Supportive Measures:   goal-setting facilitated   positive reinforcement provided   verbalization of feelings encouraged  Spiritual Activities Assistance: (Spiritual Care consult) other (see comments)  Family/Support System Care:   caregiver stress acknowledged   involvement promoted   presence promoted   self-care encouraged   support provided

## 2023-07-19 NOTE — CONSULTS
OPERATIVE REPORT  PATIENT NAME: Cristy Garcia    :  1972  MRN: [id number]  Pt Location: [facility] OR ROOM 06    SURGERY DATE: 2023    Surgeon(s) and Role:     Benja Lugo DO - Primary     * Arabella Guajardo MD - Assisting    Preop Diagnosis:  Endometrial polyp [N84 0]    Post-Op Diagnosis Codes:     * Endometrial polyp [N84 0]    Procedure(s): HYSTEROSCOPY W/  RESECTION UTERINE TUMOR/FIBROID (POLYPECTOMY )  (D&C)    Specimen(s):  ID Type Source Tests Collected by Time Destination   1 : uterine polyp Tissue Endometrium TISSUE EXAM Angie Alvarez  2023 1352        Estimated Blood Loss:   5cc    Drains:  None    Anesthesia Type:   General    Operative Indications:  Endometrial polyp [N84 0]      Operative Findings:  Grossly normal appearing external female genitalia  Nuliparous cervix, no cervical or vaginal lesions visualized  Moderate descensus  Uterus sounded to 10 cm  Normal appearing uterine cavity, proliferative tissue, both ostia visualized  Multiple endometrial polyps noted      Complications:   None    Procedure and Technique:  Patient was identified in the preop holding area as well as the operating suite  Procedure was reviewed and patient consented verbally once again  She underwent successful induction of general anesthesia using LMA  She was placed in the dorsal lithotomy position using yellowfin stirrups  She had pneumatic compression boots in place  She had a Chlorhexidine vaginal prep and was draped in sterile fashion  A operative timeout was accomplished  Exam under anesthesia revealed no vaginal or cervical lesions  The cervix was nulliparous   Uterus was anterverted and normal in size  There were no adnexal masses noted  The bladder was drained using a straight catheter for 100 cc of clear urine  The anterior lip of the cervix was grasped using a single-tooth tenaculum  Uterus sounded in midposition fashion to 10 cm    The endocervix was dilated to Palliative Care Initial Consult   Attending Physician: Maria E Silva DO  Referring Provider: PILO Victoria    Reason for Referral:  assistance with clarification of goals of care and pain    Code Status:   Code Status and Medical Interventions:   Ordered at: 07/18/23 1604     Medical Intervention Limits:    NO intubation (DNI)    NO cardioversion     Code Status (Patient has no pulse and is not breathing):    No CPR (Do Not Attempt to Resuscitate)     Medical Interventions (Patient has pulse or is breathing):    Limited Support      Advanced Directives: Advance Directive Status: Patient does not have advance directive   Family/Support: Michelle Bowden (spouse), Shellie Fowler (dtr)  Goals of Care: TBD.    HPI: Phuc Bowden is a 82 y.o. male with PMH significant for CAD s/p MI and CABG, PAG on warfarin, skin cancer, HTN, HLD, hearing loss, COPD, GERD, CHF. Patient presented [facility] ED on 7/18 due to worsening back pain for past two months. CT of chest confirm RLL consolidation and probable mass, hypodense lesions in liver and osseous mets originally noted at time of June OSH admission a few weeks ago for weakness and pain. Patient with significant weight loss of 15 lbs, increasing debility now unable to stand without assistance. Work up revealed right sided PNA, ABX started. Oncology following with recommendation for radiation oncology consult for radiation to lumbar spine. Reports all treatment will be palliative. Palliative Care consulted for [facility] in the context of complex medical decision making and symptom management.    Patient with one dose Morphine 1mg IV overnight. Patient denies pain with no movement. However, with any movement in bed pain is elicited from left leg and right shoulder (previous rotator cuff tear). Patient is unsure if Morphine effective for pain. Son at bedside who reports they are considering a comfort focused plan of care and declined biopsy. Sister is expected to bedside later this  "morning as well as other family members. ROS limited by [place] [facility].     ROS: +pain, left leg and right shoulder, pain with movement. +decreased PO intake. +debility, increased weakness and pain limiting mobility. Denies N/V, diarrhea, constipation, anxiety. ROS limited by patient's hearing deficit.       Past Medical History:   Diagnosis Date    Anemia     Arthritis     Atrial fibrillation     Bowel obstruction     Bradycardia     CHF (congestive heart failure)     COPD (chronic obstructive pulmonary disease)     Coronary artery disease     GERD (gastroesophageal reflux disease)     Hearing loss     Hyperlipidemia     Injury of back     Myocardial infarction     Sciatica      Past Surgical History:   Procedure Laterality Date    CARDIAC SURGERY      2006    CORONARY ARTERY BYPASS GRAFT      EYE SURGERY      HEMORRHOIDECTOMY       Social History     Socioeconomic History    Marital status:    Tobacco Use    Smoking status: Former     Packs/day: 2.00     Years: 15.00     Pack years: 30.00     Types: Cigarettes    Smokeless tobacco: Never   Vaping Use    Vaping Use: Never used   Substance and Sexual Activity    Alcohol use: Never    Drug use: Never    Sexual activity: Defer     Family History   Problem Relation Age of Onset    Lung cancer Sister     Lung cancer Brother        Allergies   Allergen Reactions    Bee Venom Anaphylaxis       Current medication reviewed for route, type, dose and frequency and are current per MAR at time of dictation.    Palliative Performance Scale Score:  30%    /69 (BP Location: Left arm, Patient Position: Lying)   Pulse 114   Temp 98.1 °F (36.7 °C) (Oral)   Resp 18   Ht 177.8 cm (70\")   Wt 69.9 kg (154 lb)   SpO2 96%   BMI 22.10 kg/m²     Intake/Output Summary (Last 24 hours) at 7/19/2023 0951  Last data filed at 7/19/2023 0542  Gross per 24 hour   Intake 650 ml   Output --   Net 650 ml       Physical Exam:    General Appearance:    Patient laying in bed, awake, alert, [place], " accommodate the symphion hysteroscope  The hysteroscope was then introduced with saline as a distention medium  Excellent visualization of the endocervix and endometrial cavity was accomplished  Proliferative endometrial tissue noted, both ostia visualized  Multiple endometrial polyps visualized  The symphion resection device was introduced and resection of the polyps was performed without issue  The tenaculum was removed  The patient had excellent hemostasis at this time  She was cleansed and was awakened from anesthesia without incident and was taken to the recovery room in satisfactory condition  Dr Tabatha Washington was present and participated in all key portions of the case      Patient Disposition:  PACU       SIGNATURE: Riana Oakes MD  DATE: April 25, 2023  TIME: 2:07 PM frail, A/C will appearing, cooperative, NAD   HEENT:    NC/AT, EOMI, anicteric, MMM, face relaxed   Neck:   supple, trachea midline, no JVD   Lungs:     CTA bilat, diminished in bases; respirations regular, even and unlabored; RR 16-18 on exam    Heart:    RRR, normal S1 and S2, no M/R/G,  on monitor   Abdomen:     Normal bowel sounds, soft, nontender, nondistended   G/U:   Deferred   MSK/Extremities:   Wasting, no edema   Pulses:   Pulses palpable and equal bilaterally   Skin:   Warm, dry   Neurologic:   [place], A/Ox3, cooperative, WAGNER   Psych:   Calm, appropriate         Labs:   Results from last 7 days   Lab Units 07/19/23  0656   WBC 10*3/mm3 12.67*   HEMOGLOBIN g/dL 11.7*   HEMATOCRIT % 34.6*   PLATELETS 10*3/mm3 206     Results from last 7 days   Lab Units 07/19/23  0656   SODIUM mmol/L 137   POTASSIUM mmol/L 3.6   CHLORIDE mmol/L 102   CO2 mmol/L 18.0*   BUN mg/dL 15   CREATININE mg/dL 0.56*   GLUCOSE mg/dL 100*   CALCIUM mg/dL 8.9     Results from last 7 days   Lab Units 07/19/23  0656   SODIUM mmol/L 137   POTASSIUM mmol/L 3.6   CHLORIDE mmol/L 102   CO2 mmol/L 18.0*   BUN mg/dL 15   CREATININE mg/dL 0.56*   CALCIUM mg/dL 8.9   BILIRUBIN mg/dL 1.6*   ALK PHOS U/L 230*   ALT (SGPT) U/L 12   AST (SGOT) U/L 24   GLUCOSE mg/dL 100*     Imaging Results (Last 72 Hours)       Procedure Component Value Units Date/Time    MRI Lumbar Spine With & Without Contrast [[id number]] Collected: 07/18/23 1507     Updated: 07/18/23 1535    Narrative:        MRI LUMBAR SPINE W WO CONTRAST    Date of Exam: 7/18/2023 2:27 PM EDT    Indication: Low back pain.  Bilateral lower extremity weakness and decreased sensation.  Reported lesion in the lumbar spine from outside hospital..     Comparison: CT abdomen pelvis from June 7, 2023    Technique:  Routine multiplanar/multisequence sequence images of the lumbar spine were obtained before and after the uneventful administration of 14 mL Multihance.        Findings:  The alignment is  anatomic. The vertebral body heights appear normal. There are multiple T1 hypointense enhancing lesions most prominent within the L3 vertebral body most compatible with metastasis. There is an associated mild compression deformity at L3,   likely a pathological fracture. No epidural spread of tumor is identified. There are degenerative endplate changes at L3-4 and L4-5. There is disc desiccation at all levels, with moderate degenerative loss of disc height at L4-5. The conus terminates at   the T12-L1 level. The posterior paravertebral soft tissues are unremarkable.    L1-2: Mild disc bulge. Mild bilateral facet arthropathy. No spinal canal stenosis. No neural foraminal stenosis.    L2-3: Mild disc bulge eccentric to the left. Mild right and moderate facet arthropathy with ligamentum flavum infolding. Mild spinal canal stenosis. Mild right and mild to moderate left neural foraminal stenosis.    L3-4: Mild disc bulge. Moderate bilateral facet arthropathy with ligamentum flavum infolding. Mild to moderate spinal canal stenosis. Mild to moderate right and moderate left neural foramen stenosis.    L4-5: Moderate disc bulge. Moderate bilateral facet arthropathy with ligamentum flavum infolding. Mild spinal canal stenosis. Moderate right and mild to moderate left neural foraminal stenosis.    L5-S1: Mild disc bulge eccentric to the right narrowing the right lateral recess. Moderate right and mild left facet arthropathy. No spinal canal stenosis. Moderate right and mild left neural foraminal stenosis.      Impression:      Impression:  1.Multiple enhancing lesions within lumbar spine most prominent at L3, most compatible with osseous metastasis. There is an associated mild compression deformity at L3, likely a pathological fracture.  2.Mild to moderate multilevel degenerative changes of lumbar spine as described above.        Electronically Signed: Ruddy Rodriguez    7/18/2023 3:32 PM EDT    Workstation ID: [id number]    CT  "Abdomen Pelvis With Contrast [[id number]] Collected: 07/18/23 1405     Updated: 07/18/23 1420    Narrative:      CT ABDOMEN PELVIS W CONTRAST    Date of Exam: 7/18/2023 1:08 PM EDT    Indication: Low back pain with reported lesion to the bar spine at outside 20.  Weight loss and poor appetite.  Admitted 1 month ago side hospital for \"abdominal infection \".    Comparison: June 7, 2023    Technique: Axial CT images were obtained of the abdomen and pelvis following the uneventful intravenous administration of 85 mL Isovue-370. Reconstructed coronal and sagittal images were also obtained. Automated exposure control and iterative   construction methods were used.      Findings:  Hypodense lesions within the liver appear increased in size and number. An index lesion within the left hepatic lobe on image 33 measures 3.6 x 3.1 cm, previously 1.1 x 1.0 cm. The gallbladder, adrenal glands, right kidney, spleen, and pancreas are   unremarkable. There are small left renal cyst.    The stomach appears normal. The small bowel appears normal in caliber and configuration. The colon appears normal. The appendix appears normal. There is no ascites or loculated collection. No abnormally enlarged lymph nodes are identified.    The rectum and urinary bladder are unremarkable. The prostate is enlarged measuring 4.8 cm in transverse dimension.    Several osseous metastases have increased in size, for example an index lesion involving the right L3 vertebral body measuring 4.9 cm, previously 3.2 cm. Another metastasis is seen within the left ischium.      Impression:      Impression:  1.Progressive hypodense lesions within liver and progressive osseous metastasis, compatible with disease progression.  2.No acute process identified within abdomen/pelvis.  3.Enlarged prostate.        Electronically Signed: Ruddy Rodriguez    7/18/2023 2:17 PM EDT    Workstation ID: [id number]    CT Angiogram Chest [[id number]] Collected: 07/18/23 1340     " Updated: 07/18/23 1409    Narrative:      CT ANGIOGRAM CHEST    Date of Exam: 7/18/2023 1:08 PM EDT    Indication: Per family, oxygen level 80% this morning.  Weight loss over the past 1 month.  Possible malignancy..    Comparison: None available.    Technique: Axial pulmonary arterial phase IV contrast enhanced CT angiogram of the chest per PE protocol. Two-dimensional reconstructions were postprocessed. Axial IV contrast-enhanced CT of the abdomen and pelvis with multiplanar reconstruction.    Findings:  CTA chest: There is no pathologic axillary adenopathy or other worrisome body wall soft tissue finding in the chest. Trace pleural effusion is present on the right. There is no pericardial effusion. Atherosclerotic, nonaneurysmal thoracic aorta. The   pulmonary arteries are patent and well opacified, without evidence of filling defect concerning for acute pulmonary embolus. There is no distinct pathologic mediastinal adenopathy. Evaluation of the osseous structures demonstrates no evidence of acute   fracture or aggressive osseous lesion. Evaluation of the lung fields demonstrates fairly extensive confluent consolidation in the right lower lobe, with some evidence of endobronchial obstruction, with underlying mass not entirely excluded. The remaining   lung fields demonstrate moderate emphysema and mild diffuse peribronchial groundglass opacity, suggesting component of bronchiolitis.      Impression:      Impression:  Prominent consolidation is noted in the right lower lobe, somewhat masslike with evidence of endobronchial obstruction, concerning for underlying possible primary or metastatic neoplasm. There is otherwise no suspicious thoracic adenopathy.    Diffuse areas of centrilobular groundglass opacity are present, suggesting component of bronchiolitis. No additional acute findings are present. There is no acute pulmonary embolus.        Electronically Signed: Eder Mireles    7/18/2023 2:06 PM EDT     Workstation ID: [id number]                  Diagnostics: Reviewed    A:   Cancer associated pain    Right lower lobe lung mass    Metastasis    Coronary artery disease with history of myocardial infarction without history of CABG    COPD (chronic obstructive pulmonary disease)    Essential hypertension    Hyperlipidemia    Functional assessment declined    PAF (paroxysmal atrial fibrillation)    Pneumonia of right lung due to infectious organism    Supratherapeutic INR    Lactic acidosis    Leukocytosis    CHF (congestive heart failure)     82 y.o. 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 Debility -due to disease progression, PT/OT unlikely to improve debility    3. Decreased PO intake -offer frequent snacks and supplements  -ordered Boost Plus vanilla or strawberry per patient preference  -SLP evaluation with regular texture, thin liquids    4. Constipation -prevention  -continue bowel regimen    5. GOC -DNR/DNI -per review of chart  -patient/family considering comfort focused plan of care  -hospice consult placed for further information  -Radiation Oncology consult pending for palliative radiation    P: Introduced Palliative Care and services to patient and son at bedside. ROS limited by [place] Family and patient considering comfort focused plan of care, they are agreeable to hospice consult for further information. Discussed patient's symptoms. Bone pain limiting patient's functioning, adjunct medications limited due to elevated INR and risk of bleeding. Will adjust opioids at this time. All questions and concerns addressed.   Thank you for this consult and allowing us to participate in patient's  plan of care. Palliative Care Team will continue to follow patient. Please do not hesitate to contact us regarding further symptom management or goals of care needs.  Time: 45 minutes spent reviewing medical and medication records, assessing and examining patient, discussing with family, answering questions, providing some guidance about a plan and documentation of care, and coordinating care with other healthcare members, with > 50% time spent face to face.         Jazmine Jamison, APRN  7/19/2023

## 2023-07-19 NOTE — CONSULTS
Nutrition Services    Patient Name:  Phuc Bowden  YOB: 1940  MRN: [id number]  Admit Date:  7/18/2023    EMR Reviewed    Consult received for significant wt loss and decreased PO intake. Pt was MARY for procedure at time of attempted visit. Noted pt discussed GOC with palliative - hospice consult in place. Pt likely meets criteria for malnutrition - will complete NFPE and full assessment as able.     Electronically signed by:  Yuliya Marie MS,RD,LD  07/19/23 14:48 EDT

## 2023-07-19 NOTE — PROGRESS NOTES
"Pharmacy Consult  -  Warfarin    Phuc Bowden is a  82 y.o. male   Height - 177.8 cm (70\")  Weight - 69.9 kg (154 lb)    Consulting Provider: Hospitalist  Indication: Afib   Goal INR: 2-3  Home Regimen:   - warfarin 3.5mg daily     Bridge Therapy: No     Drug-Drug Interactions with current regimen:  Pravastatin (PTA med) - may enhance AC effects of warfarin   Zosyn - may enhance AC effects of warfarin    Warfarin Dosing During Admission:  Date  7/18 7/19          INR  5.91 3.49          Dose  HOLD  PO Vit K 5mg  0             Education Provided: patient hard of hearing. Warfarin education provided on 7/19/23 verbally and in writing.  Discussed effects of warfarin, importance of checking INR, drug-drug and drug-food interactions, and signs/symptoms of bleeding and clotting.  Patient/family verbalized understanding through teach back.  All pertinent questions were answered.        Discharge Follow up:   Following Provider -   Blayne Adan MD, [facility]   Follow up time range or appointment - 2-3 days after hospital discharge    Labs:  Results from last 7 days   Lab Units 07/19/23  0656 07/18/23  1251   INR  3.49* 5.91*   HEMOGLOBIN g/dL 11.7* 13.7   HEMATOCRIT % 34.6* 42.1     Results from last 7 days   Lab Units 07/19/23  0656 07/18/23  1331 07/18/23  1253   SODIUM mmol/L 137 136  --    POTASSIUM mmol/L 3.6 4.1  --    CHLORIDE mmol/L 102 97*  --    CO2 mmol/L 18.0* 22.0  --    BUN mg/dL 15 17  --    CREATININE mg/dL 0.56* 0.98 0.90   CALCIUM mg/dL 8.9 10.3  --    BILIRUBIN mg/dL 1.6* 2.0*  --    ALK PHOS U/L 230* 270*  --    ALT (SGPT) U/L 12 16  --    AST (SGOT) U/L 24 28  --    GLUCOSE mg/dL 100* 115*  --      Current dietary intake: new admission  Diet Order   Procedures    NPO Diet NPO Type: Sips with Meds     Assessment/Plan:   Pharmacy to dose warfarin for Afib.   Goal INR 2-3.  7/19 INR - 3.49, decreased from 5.91  7/19 H/H - 11.7/34.6    Vitamin K 5mg 7/18 x1  Continue to hold warfarin 7/19  Monitor s/s " bleeding, clinical status, dietary intake and drug-drug interactions  Follow daily INR and adjust dose accordingly    Thanks  Solomon Mendoza, [facility]   7/19/2023  08:26 EDT

## 2023-07-19 NOTE — PROGRESS NOTES
Subjective     PROBLEM LIST:    Cancer associated pain    Right lower lobe lung mass    Metastasis    Coronary artery disease with history of myocardial infarction without history of CABG    COPD (chronic obstructive pulmonary disease)    Essential hypertension    Hyperlipidemia    Functional assessment declined    PAF (paroxysmal atrial fibrillation)    Pneumonia of right lung due to infectious organism    Supratherapeutic INR    Lactic acidosis    Leukocytosis    CHF (congestive heart failure)         CHIEF COMPLAINT: Metastatic cancer    HISTORY OF PRESENT ILLNESS:  The patient is a 82 y.o. male, referred  for evaluation of presumed metastatic lung cancer.  History mostly obtained from his daughter and son at the bedside.    He was fairly active and healthy until a few months ago when he started having pain.  He was found to have a lung mass with metastasis involving the liver and bone.    He received palliative radiotherapy to the spine today.    REVIEW OF SYSTEMS:  A 14 point review of systems was performed and is negative except as noted above.    Past Medical History:   Diagnosis Date    Anemia     Arthritis     Atrial fibrillation     Bowel obstruction     Bradycardia     CHF (congestive heart failure)     COPD (chronic obstructive pulmonary disease)     Coronary artery disease     GERD (gastroesophageal reflux disease)     Hearing loss     Hyperlipidemia     Injury of back     Myocardial infarction     Sciatica        No current facility-administered medications on file prior to encounter.     Current Outpatient Medications on File Prior to Encounter   Medication Sig Dispense Refill    digoxin (LANOXIN) 125 MCG tablet Take 1 tablet by mouth Every Other Day.      finasteride (PROSCAR) 5 MG tablet Take 1 tablet by mouth Every Night.      folic acid (FOLVITE) 1 MG tablet Take 1 tablet by mouth Daily.      losartan (COZAAR) 25 MG tablet Take 80 mg by mouth Daily.      metoprolol tartrate (LOPRESSOR) 25 MG  tablet Take 4 tablets by mouth 2 (Two) Times a Day.      montelukast (SINGULAIR) 10 MG tablet Take 1 tablet by mouth 2 (Two) Times a Day.      pravastatin (PRAVACHOL) 40 MG tablet Take 1 tablet by mouth Daily.      tamsulosin (FLOMAX) 0.4 MG capsule 24 hr capsule Take 1 capsule by mouth Every Night.      warfarin (COUMADIN) 4 MG tablet Take 1 tablet by mouth Daily.      HYDROcodone-acetaminophen (NORCO)  MG per tablet Take 1 tablet by mouth Every 6 (Six) Hours As Needed for Moderate Pain.      nitroglycerin (NITROSTAT) 0.4 MG SL tablet Place 1 tablet under the tongue Every 5 (Five) Minutes As Needed for Chest Pain. Take no more than 3 doses in 15 minutes.      traMADol (ULTRAM) 50 MG tablet Take 1 tablet by mouth 2 (Two) Times a Day As Needed for Moderate Pain.         Allergies   Allergen Reactions    Bee Venom Anaphylaxis       Past Surgical History:   Procedure Laterality Date    CARDIAC SURGERY      2006    CORONARY ARTERY BYPASS GRAFT      EYE SURGERY      HEMORRHOIDECTOMY         OB History   No obstetric history on file.       Social History     Socioeconomic History    Marital status:    Tobacco Use    Smoking status: Former     Packs/day: 2.00     Years: 15.00     Pack years: 30.00     Types: Cigarettes    Smokeless tobacco: Never   Vaping Use    Vaping Use: Never used   Substance and Sexual Activity    Alcohol use: Never    Drug use: Never    Sexual activity: Defer       Family History   Problem Relation Age of Onset    Lung cancer Sister     Lung cancer Brother        Objective     Vitals:    07/19/23 0600 07/19/23 0742 07/19/23 0958 07/19/23 1203   BP:  107/69 111/75 129/74   BP Location:  Left arm  Left arm   Patient Position:  Lying  Lying   Pulse: 114  114 114   Resp:  18  18   Temp:  98.1 °F (36.7 °C)  97.9 °F (36.6 °C)   TempSrc:  Oral  Oral   SpO2: 96%   97%   Weight:       Height:                       Performance Status: 3  General: well appearing male in no acute distress  Neuro:  [place]  HEENT: sclerae anicteric, oropharynx clear  Lymphatics: no cervical, supraclavicular, or axillary adenopathy  Cardiovascular: regular rate and rhythm, no murmurs  Lungs:decreased right base  Abdomen: soft, nontender, nondistended.  No palpable organomegaly  Extremities: no lower extremity edema  Skin: no rashes, lesions, bruising, or petechiae  Psych: mood and affect appropriate    Lab Results   Component Value Date    WBC 12.67 (H) 07/19/2023    HGB 11.7 (L) 07/19/2023    HCT 34.6 (L) 07/19/2023    MCV 87.8 07/19/2023     07/19/2023     Lab Results   Component Value Date    GLUCOSE 100 (H) 07/19/2023    BUN 15 07/19/2023    CREATININE 0.56 (L) 07/19/2023    EGFRIFNONA 82 03/15/2018    BCR 26.8 (H) 07/19/2023    K 3.6 07/19/2023    CO2 18.0 (L) 07/19/2023    CALCIUM 8.9 07/19/2023    ALBUMIN 2.9 (L) 07/19/2023    AST 24 07/19/2023    ALT 12 07/19/2023     MRI Lumbar Spine With & Without Contrast  Narrative: MRI LUMBAR SPINE W WO CONTRAST    Date of Exam: 7/18/2023 2:27 PM EDT    Indication: Low back pain.  Bilateral lower extremity weakness and decreased sensation.  Reported lesion in the lumbar spine from outside hospital..     Comparison: CT abdomen pelvis from June 7, 2023    Technique:  Routine multiplanar/multisequence sequence images of the lumbar spine were obtained before and after the uneventful administration of 14 mL Multihance.      Findings:  The alignment is anatomic. The vertebral body heights appear normal. There are multiple T1 hypointense enhancing lesions most prominent within the L3 vertebral body most compatible with metastasis. There is an associated mild compression deformity at L3,   likely a pathological fracture. No epidural spread of tumor is identified. There are degenerative endplate changes at L3-4 and L4-5. There is disc desiccation at all levels, with moderate degenerative loss of disc height at L4-5. The conus terminates at   the T12-L1 level. The posterior paravertebral  "soft tissues are unremarkable.    L1-2: Mild disc bulge. Mild bilateral facet arthropathy. No spinal canal stenosis. No neural foraminal stenosis.    L2-3: Mild disc bulge eccentric to the left. Mild right and moderate facet arthropathy with ligamentum flavum infolding. Mild spinal canal stenosis. Mild right and mild to moderate left neural foraminal stenosis.    L3-4: Mild disc bulge. Moderate bilateral facet arthropathy with ligamentum flavum infolding. Mild to moderate spinal canal stenosis. Mild to moderate right and moderate left neural foramen stenosis.    L4-5: Moderate disc bulge. Moderate bilateral facet arthropathy with ligamentum flavum infolding. Mild spinal canal stenosis. Moderate right and mild to moderate left neural foraminal stenosis.    L5-S1: Mild disc bulge eccentric to the right narrowing the right lateral recess. Moderate right and mild left facet arthropathy. No spinal canal stenosis. Moderate right and mild left neural foraminal stenosis.  Impression: Impression:  1.Multiple enhancing lesions within lumbar spine most prominent at L3, most compatible with osseous metastasis. There is an associated mild compression deformity at L3, likely a pathological fracture.  2.Mild to moderate multilevel degenerative changes of lumbar spine as described above.    Electronically Signed: Ruddy Rodriguez    7/18/2023 3:32 PM EDT    Workstation ID: [id number]  CT Abdomen Pelvis With Contrast  Narrative: CT ABDOMEN PELVIS W CONTRAST    Date of Exam: 7/18/2023 1:08 PM EDT    Indication: Low back pain with reported lesion to the bar spine at outside 20.  Weight loss and poor appetite.  Admitted 1 month ago side hospital for \"abdominal infection \".    Comparison: June 7, 2023    Technique: Axial CT images were obtained of the abdomen and pelvis following the uneventful intravenous administration of 85 mL Isovue-370. Reconstructed coronal and sagittal images were also obtained. Automated exposure control and " iterative   construction methods were used.    Findings:  Hypodense lesions within the liver appear increased in size and number. An index lesion within the left hepatic lobe on image 33 measures 3.6 x 3.1 cm, previously 1.1 x 1.0 cm. The gallbladder, adrenal glands, right kidney, spleen, and pancreas are   unremarkable. There are small left renal cyst.    The stomach appears normal. The small bowel appears normal in caliber and configuration. The colon appears normal. The appendix appears normal. There is no ascites or loculated collection. No abnormally enlarged lymph nodes are identified.    The rectum and urinary bladder are unremarkable. The prostate is enlarged measuring 4.8 cm in transverse dimension.    Several osseous metastases have increased in size, for example an index lesion involving the right L3 vertebral body measuring 4.9 cm, previously 3.2 cm. Another metastasis is seen within the left ischium.  Impression: Impression:  1.Progressive hypodense lesions within liver and progressive osseous metastasis, compatible with disease progression.  2.No acute process identified within abdomen/pelvis.  3.Enlarged prostate.    Electronically Signed: Ruddy Rodriguez    7/18/2023 2:17 PM EDT    Workstation ID: [id number]  CT Angiogram Chest  Narrative: CT ANGIOGRAM CHEST    Date of Exam: 7/18/2023 1:08 PM EDT    Indication: Per family, oxygen level 80% this morning.  Weight loss over the past 1 month.  Possible malignancy..    Comparison: None available.    Technique: Axial pulmonary arterial phase IV contrast enhanced CT angiogram of the chest per PE protocol. Two-dimensional reconstructions were postprocessed. Axial IV contrast-enhanced CT of the abdomen and pelvis with multiplanar reconstruction.    Findings:  CTA chest: There is no pathologic axillary adenopathy or other worrisome body wall soft tissue finding in the chest. Trace pleural effusion is present on the right. There is no pericardial effusion.  Atherosclerotic, nonaneurysmal thoracic aorta. The   pulmonary arteries are patent and well opacified, without evidence of filling defect concerning for acute pulmonary embolus. There is no distinct pathologic mediastinal adenopathy. Evaluation of the osseous structures demonstrates no evidence of acute   fracture or aggressive osseous lesion. Evaluation of the lung fields demonstrates fairly extensive confluent consolidation in the right lower lobe, with some evidence of endobronchial obstruction, with underlying mass not entirely excluded. The remaining   lung fields demonstrate moderate emphysema and mild diffuse peribronchial groundglass opacity, suggesting component of bronchiolitis.  Impression: Impression:  Prominent consolidation is noted in the right lower lobe, somewhat masslike with evidence of endobronchial obstruction, concerning for underlying possible primary or metastatic neoplasm. There is otherwise no suspicious thoracic adenopathy.    Diffuse areas of centrilobular groundglass opacity are present, suggesting component of bronchiolitis. No additional acute findings are present. There is no acute pulmonary embolus.    Electronically Signed: Eder Mireles    7/18/2023 2:06 PM EDT    Workstation ID: [id number]          Assessment & Plan     Phuc Bowden is a 82 y.o. male with presumed metastatic lung cancer.    Agree with the previously noted plan of no biopsy and palliative radiotherapy.  Discussed potential expectations and prognosis with his children at the bedside.  Hospice care appropriate.    We will sign off, please call with further questions.           Kelley Armenta MD    7/19/2023

## 2023-07-20 LAB
ANION GAP SERPL CALCULATED.3IONS-SCNC: 14 MMOL/L (ref 5–15)
BUN SERPL-MCNC: 15 MG/DL (ref 8–23)
BUN/CREAT SERPL: 31.9 (ref 7–25)
CALCIUM SPEC-SCNC: 9.3 MG/DL (ref 8.6–10.5)
CHLORIDE SERPL-SCNC: 102 MMOL/L (ref 98–107)
CO2 SERPL-SCNC: 20 MMOL/L (ref 22–29)
CREAT SERPL-MCNC: 0.47 MG/DL (ref 0.76–1.27)
DEPRECATED RDW RBC AUTO: 43.1 FL (ref 37–54)
EGFRCR SERPLBLD CKD-EPI 2021: 104.4 ML/MIN/1.73
ERYTHROCYTE [DISTWIDTH] IN BLOOD BY AUTOMATED COUNT: 13.8 % (ref 12.3–15.4)
GLUCOSE SERPL-MCNC: 99 MG/DL (ref 65–99)
HCT VFR BLD AUTO: 34.9 % (ref 37.5–51)
HGB BLD-MCNC: 11.6 G/DL (ref 13–17.7)
INR PPP: 1.73 (ref 0.89–1.12)
MCH RBC QN AUTO: 28.6 PG (ref 26.6–33)
MCHC RBC AUTO-ENTMCNC: 33.2 G/DL (ref 31.5–35.7)
MCV RBC AUTO: 86.2 FL (ref 79–97)
PLATELET # BLD AUTO: 211 10*3/MM3 (ref 140–450)
PMV BLD AUTO: 9.6 FL (ref 6–12)
POTASSIUM SERPL-SCNC: 3 MMOL/L (ref 3.5–5.2)
POTASSIUM SERPL-SCNC: 4 MMOL/L (ref 3.5–5.2)
PROTHROMBIN TIME: 20.4 SECONDS (ref 12.2–14.5)
RBC # BLD AUTO: 4.05 10*6/MM3 (ref 4.14–5.8)
SODIUM SERPL-SCNC: 136 MMOL/L (ref 136–145)
WBC NRBC COR # BLD: 12.76 10*3/MM3 (ref 3.4–10.8)

## 2023-07-20 PROCEDURE — 85610 PROTHROMBIN TIME: CPT | Performed by: STUDENT IN AN ORGANIZED HEALTH CARE EDUCATION/TRAINING PROGRAM

## 2023-07-20 PROCEDURE — 97535 SELF CARE MNGMENT TRAINING: CPT | Performed by: OCCUPATIONAL THERAPIST

## 2023-07-20 PROCEDURE — G0378 HOSPITAL OBSERVATION PER HR: HCPCS

## 2023-07-20 PROCEDURE — 99214 OFFICE O/P EST MOD 30 MIN: CPT | Performed by: INTERNAL MEDICINE

## 2023-07-20 PROCEDURE — 80048 BASIC METABOLIC PNL TOTAL CA: CPT | Performed by: INTERNAL MEDICINE

## 2023-07-20 PROCEDURE — 85027 COMPLETE CBC AUTOMATED: CPT | Performed by: INTERNAL MEDICINE

## 2023-07-20 PROCEDURE — 97166 OT EVAL MOD COMPLEX 45 MIN: CPT | Performed by: OCCUPATIONAL THERAPIST

## 2023-07-20 PROCEDURE — 84132 ASSAY OF SERUM POTASSIUM: CPT | Performed by: NURSE PRACTITIONER

## 2023-07-20 PROCEDURE — 99232 SBSQ HOSP IP/OBS MODERATE 35: CPT | Performed by: INTERNAL MEDICINE

## 2023-07-20 PROCEDURE — 97162 PT EVAL MOD COMPLEX 30 MIN: CPT

## 2023-07-20 RX ORDER — LORAZEPAM 0.5 MG/1
0.25 TABLET ORAL EVERY 4 HOURS PRN
Status: DISCONTINUED | OUTPATIENT
Start: 2023-07-20 | End: 2023-07-20

## 2023-07-20 RX ORDER — OXYCODONE AND ACETAMINOPHEN 7.5; 325 MG/1; MG/1
1 TABLET ORAL EVERY 4 HOURS PRN
Qty: 30 TABLET | Refills: 0 | Status: SHIPPED | OUTPATIENT
Start: 2023-07-20

## 2023-07-20 RX ORDER — POTASSIUM CHLORIDE 750 MG/1
40 CAPSULE, EXTENDED RELEASE ORAL EVERY 4 HOURS
Status: ACTIVE | OUTPATIENT
Start: 2023-07-20 | End: 2023-07-21

## 2023-07-20 RX ORDER — LORAZEPAM 0.5 MG/1
0.5 TABLET ORAL EVERY 4 HOURS PRN
Status: DISCONTINUED | OUTPATIENT
Start: 2023-07-20 | End: 2023-07-21 | Stop reason: HOSPADM

## 2023-07-20 RX ORDER — NALOXONE HYDROCHLORIDE 4 MG/.1ML
SPRAY NASAL
Qty: 2 EACH | Refills: 0 | Status: SHIPPED | OUTPATIENT
Start: 2023-07-20

## 2023-07-20 RX ORDER — LORAZEPAM 0.5 MG/1
0.5 TABLET ORAL EVERY 4 HOURS PRN
Qty: 30 TABLET | Refills: 0 | Status: SHIPPED | OUTPATIENT
Start: 2023-07-20

## 2023-07-20 RX ADMIN — POTASSIUM CHLORIDE 40 MEQ: 750 CAPSULE, EXTENDED RELEASE ORAL at 18:40

## 2023-07-20 RX ADMIN — METOPROLOL TARTRATE 25 MG: 25 TABLET, FILM COATED ORAL at 09:18

## 2023-07-20 RX ADMIN — Medication 5 MG: at 22:13

## 2023-07-20 RX ADMIN — Medication 10 ML: at 21:48

## 2023-07-20 RX ADMIN — LORAZEPAM 0.5 MG: 0.5 TABLET ORAL at 18:52

## 2023-07-20 RX ADMIN — Medication 10 ML: at 09:19

## 2023-07-20 RX ADMIN — PRAVASTATIN SODIUM 40 MG: 40 TABLET ORAL at 21:48

## 2023-07-20 RX ADMIN — DILTIAZEM HYDROCHLORIDE 120 MG: 120 CAPSULE, COATED, EXTENDED RELEASE ORAL at 09:18

## 2023-07-20 RX ADMIN — FAMOTIDINE 20 MG: 20 TABLET, FILM COATED ORAL at 09:18

## 2023-07-20 RX ADMIN — LORAZEPAM 0.25 MG: 0.5 TABLET ORAL at 05:35

## 2023-07-20 RX ADMIN — WARFARIN SODIUM 3.5 MG: 2.5 TABLET ORAL at 18:41

## 2023-07-20 RX ADMIN — POTASSIUM CHLORIDE 40 MEQ: 750 CAPSULE, EXTENDED RELEASE ORAL at 12:37

## 2023-07-20 RX ADMIN — DOXYCYCLINE 100 MG: 100 CAPSULE ORAL at 09:18

## 2023-07-20 RX ADMIN — FOLIC ACID 1 MG: 1 TABLET ORAL at 09:18

## 2023-07-20 RX ADMIN — SENNOSIDES AND DOCUSATE SODIUM 2 TABLET: 50; 8.6 TABLET ORAL at 09:18

## 2023-07-20 RX ADMIN — LORAZEPAM 0.5 MG: 0.5 TABLET ORAL at 12:37

## 2023-07-20 RX ADMIN — OXYCODONE HYDROCHLORIDE AND ACETAMINOPHEN 1 TABLET: 7.5; 325 TABLET ORAL at 05:35

## 2023-07-20 RX ADMIN — OXYCODONE HYDROCHLORIDE AND ACETAMINOPHEN 1 TABLET: 7.5; 325 TABLET ORAL at 12:37

## 2023-07-20 RX ADMIN — OXYCODONE HYDROCHLORIDE AND ACETAMINOPHEN 1 TABLET: 7.5; 325 TABLET ORAL at 22:13

## 2023-07-20 RX ADMIN — LOSARTAN POTASSIUM 25 MG: 25 TABLET, FILM COATED ORAL at 09:18

## 2023-07-20 RX ADMIN — FAMOTIDINE 20 MG: 20 TABLET, FILM COATED ORAL at 17:30

## 2023-07-20 RX ADMIN — Medication 1 CAPSULE: at 09:18

## 2023-07-20 NOTE — PLAN OF CARE
Goal Outcome Evaluation:  Plan of Care Reviewed With: patient, daughter        Progress: no change  Outcome Evaluation: Patient presents with weakness, balance deficits, cognitive impairments, and decreased trunk control affecting functional mobility. Skilled IP PT services warranted to address deficits and support return to baseline. Pt's plan is home with hospice per daughter. Recommend 24/7 supervision, HHPT, and a front wheeled walker at discharge.      Anticipated Discharge Disposition (PT): home with 24/7 care, home with home health

## 2023-07-20 NOTE — PROGRESS NOTES
[facility] Medicine Services  PROGRESS NOTE    Patient Name: Phuc Bowden  : 1940  MRN: [id number]    Date of Admission: 2023  Primary Care Physician: Blayne Adan MD    Subjective   Subjective     CC:  Leg pain    HPI:  No acute events.  Patient resting and did not wake.  Family at bedside.  Plan on going home with hospice tomorrow    ROS:  Unable to obtain  Objective   Objective     Vital Signs:   Temp:  [94.7 °F (34.8 °C)-98 °F (36.7 °C)] 94.7 °F (34.8 °C)  Heart Rate:  [112-145] 131  Resp:  [16-19] 18  BP: ()/(63-86) 106/86     Physical Exam:  Constitutional: frail; up in the chair sleeping  HENT: NCAT, mucous membranes moist  Respiratory: normal effort and rate   Cardiovascular: tachy on tele   Musculoskeletal: No bilateral ankle edema  Psychiatric: calm  Neurologic: strength symmetric in all extremities, Cranial Nerves grossly intact to confrontation  Skin: No rashes    Results Reviewed:  LAB RESULTS:      Lab 23  0747 23  0656 23  1534 23  1331 23  1251   WBC 12.76* 12.67*  --   --  16.55*   HEMOGLOBIN 11.6* 11.7*  --   --  13.7   HEMATOCRIT 34.9* 34.6*  --   --  42.1   PLATELETS 211 206  --   --  284   NEUTROS ABS  --  10.63*  --   --  13.99*   IMMATURE GRANS (ABS)  --  0.16*  --   --  0.42*   LYMPHS ABS  --  0.78  --   --  0.87   MONOS ABS  --  1.07*  --   --  1.21*   EOS ABS  --  0.00  --   --  0.01   MCV 86.2 87.8  --   --  90.0   PROCALCITONIN  --   --   --  0.27*  --    LACTATE  --   --  1.4  --  2.6*   PROTIME 20.4* 35.3*  --   --  53.3*         Lab 23  0747 23  1804 23  0656 23  1331 23  1253   SODIUM 136  --  137 136  --    POTASSIUM 3.0* 3.7 3.6 4.1  --    CHLORIDE 102  --  102 97*  --    CO2 20.0*  --  18.0* 22.0  --    ANION GAP 14.0  --  17.0* 17.0*  --    BUN 15  --  15 17  --    CREATININE 0.47*  --  0.56* 0.98 0.90   EGFR 104.4  --  99.0 77.5  --    GLUCOSE 99  --  100* 115*  --     CALCIUM 9.3  --  8.9 10.3  --    MAGNESIUM  --   --  1.8  --   --          Lab 07/19/23  0656 07/18/23  1331   TOTAL PROTEIN 5.7* 7.7   ALBUMIN 2.9* 3.6   GLOBULIN 2.8 4.1   ALT (SGPT) 12 16   AST (SGOT) 24 28   BILIRUBIN 1.6* 2.0*   ALK PHOS 230* 270*         Lab 07/20/23  0747 07/19/23  0656 07/18/23  1331 07/18/23  1251   PROBNP  --   --  11,337.0*  --    HSTROP T  --   --  21*  --    PROTIME 20.4* 35.3*  --  53.3*   INR 1.73* 3.49*  --  5.91*                 Brief Urine Lab Results  (Last result in the past 365 days)        Color   Clarity   Blood   Leuk Est   Nitrite   Protein   CREAT   Urine HCG        07/18/23 1548 Yellow   Clear   Trace   Negative   Negative   Trace                   Microbiology Results Abnormal       Procedure Component Value - Date/Time    Blood Culture - Blood, Arm, Left [[id number]]  (Normal) Collected: 07/18/23 1530    Lab Status: Preliminary result Specimen: Blood from Arm, Left Updated: 07/19/23 1600     Blood Culture No growth at 24 hours    Blood Culture - Blood, Arm, Right [[id number]]  (Normal) Collected: 07/18/23 1535    Lab Status: Preliminary result Specimen: Blood from Arm, Right Updated: 07/19/23 1600     Blood Culture No growth at 24 hours    MRSA Screen, PCR (Inpatient) - Swab, Nares [[id number]]  (Normal) Collected: 07/19/23 0542    Lab Status: Final result Specimen: Swab from Nares Updated: 07/19/23 0840     MRSA PCR Negative    Narrative:      The negative predictive value of this diagnostic test is high and should only be used to consider de-escalating anti-MRSA therapy. A positive result may indicate colonization with MRSA and must be correlated clinically.  MRSA Negative    S. Pneumo Ag Urine or CSF - Urine, Urine, Clean Catch [[id number]]  (Normal) Collected: 07/18/23 1548    Lab Status: Final result Specimen: Urine, Clean Catch Updated: 07/19/23 0117     Strep Pneumo Ag Negative    Legionella Antigen, Urine - Urine, Urine, Clean Catch [[id number]]  (Normal) Collected:  07/18/23 1548    Lab Status: Final result Specimen: Urine, Clean Catch Updated: 07/19/23 0117     LEGIONELLA ANTIGEN, URINE Negative    Respiratory Panel PCR w/COVID-19(SARS-CoV-2) CASSIDY/TAMELA/ARTURO/PAD/COR/MAD/GRETCHEN In-House, NP Swab in UTM/VTM, 3-4 HR TAT - Swab, Nasopharynx [[id number]]  (Normal) Collected: 07/18/23 1619    Lab Status: Final result Specimen: Swab from Nasopharynx Updated: 07/18/23 1716     ADENOVIRUS, PCR Not Detected     Coronavirus 229E Not Detected     Coronavirus HKU1 Not Detected     Coronavirus NL63 Not Detected     Coronavirus OC43 Not Detected     COVID19 Not Detected     Human Metapneumovirus Not Detected     Human Rhinovirus/Enterovirus Not Detected     Influenza A PCR Not Detected     Influenza B PCR Not Detected     Parainfluenza Virus 1 Not Detected     Parainfluenza Virus 2 Not Detected     Parainfluenza Virus 3 Not Detected     Parainfluenza Virus 4 Not Detected     RSV, PCR Not Detected     Bordetella pertussis pcr Not Detected     Bordetella parapertussis PCR Not Detected     Chlamydophila pneumoniae PCR Not Detected     Mycoplasma pneumo by PCR Not Detected    Narrative:      In the setting of a positive respiratory panel with a viral infection PLUS a negative procalcitonin without other underlying concern for bacterial infection, consider observing off antibiotics or discontinuation of antibiotics and continue supportive care. If the respiratory panel is positive for atypical bacterial infection (Bordetella pertussis, Chlamydophila pneumoniae, or Mycoplasma pneumoniae), consider antibiotic de-escalation to target atypical bacterial infection.            No radiology results from the last 24 hrs        Current medications:  Scheduled Meds:cefTRIAXone, 1,000 mg, Intravenous, Q24H  dilTIAZem CD, 120 mg, Oral, Daily  doxycycline, 100 mg, Oral, Q12H  famotidine, 20 mg, Oral, BID AC  folic acid, 1 mg, Oral, Daily  lactobacillus acidophilus, 1 capsule, Oral, Daily  losartan, 25 mg, Oral,  Daily  metoprolol tartrate, 25 mg, Oral, Q12H  Pharmacy Meds to Bed Consult, , Does not apply, Daily  potassium chloride ER, 40 mEq, Oral, Q4H  pravastatin, 40 mg, Oral, Nightly  senna-docusate sodium, 2 tablet, Oral, BID  sodium chloride, 10 mL, Intravenous, Q12H  warfarin, 3.5 mg, Oral, Daily      Continuous Infusions:Pharmacy to dose warfarin,       PRN Meds:.  acetaminophen    aluminum-magnesium hydroxide-simethicone    senna-docusate sodium **AND** polyethylene glycol **AND** bisacodyl **AND** bisacodyl    Calcium Replacement - Follow Nurse / BPA Driven Protocol    LORazepam    Magnesium Standard Dose Replacement - Follow Nurse / BPA Driven Protocol    melatonin    Morphine **AND** naloxone    ondansetron    oxyCODONE-acetaminophen    Pharmacy to dose warfarin    Phosphorus Replacement - Follow Nurse / BPA Driven Protocol    Potassium Replacement - Follow Nurse / BPA Driven Protocol    sodium chloride    sodium chloride    sodium chloride    Assessment & Plan   Assessment & Plan     Active Hospital Problems    Diagnosis  POA    **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]  Yes    Right lower lobe lung mass [R91.8]  Unknown    Metastasis [C79.9]  Unknown    Coronary artery disease with history of myocardial infarction without history of CABG [I25.10, I25.2]  Not Applicable    COPD (chronic obstructive pulmonary disease) [J44.9]  Unknown    Essential hypertension [I10]  Unknown    Hyperlipidemia [E78.5]  Unknown    Functional assessment declined [Z53.20]  Not Applicable    PAF (paroxysmal atrial fibrillation) [I48.0]  Unknown    Pneumonia of right lung due to infectious organism [J18.9]  Unknown    Supratherapeutic INR [R79.1]  Unknown    Lactic acidosis [E87.20]  Unknown    Leukocytosis [D72.829]  Unknown    CHF (congestive heart failure) [I50.9]  Unknown      Resolved Hospital Problems   No resolved problems to display.        Brief Hospital Course to date:  Phuc Bowden is a 82 y.o. male with pmh significant for CAD s/p  MI and CABG, PAF on warfarin, skin cancer, HTN, HLD, hearing loss, previous tobacco use long term with COPD, GERD, presents with worsening low back pain x 2 months.  Patient had a recent fall and evaluation at that time showed concerns for bony mets.  Imaging also with liver mets as well as a right lung mass.  Patient was to have a biopsy at  however this was not done due to warfarin.  He has had progressive pain and weakness prompting evaluation.     Intractable Back Pain with functional decline/ FTT  Right lung mass with liver/ spinal mets  -- recent discovery of lung mass in June upon OSH ED with pending work up through [facility], however pain and debility increasing  -Oncology consulted.  Reviewed treatment be palliative in nature.  Family has opted to not undergo biopsy of right lung mass.  Patient received 1 dose of palliative radiation on 7/19.  Palliative care was consulted and medications adjusted for comfort.  -Plan for discharge home with outpatient hospice 7/21     Right sided pneumonia, post obstructive  Leukocytosis, lactic acidosis  -- zosyn and vanc started in ED. then continued on Rocephin and Doxy.  -Blood cultures no growth to date.  Strep and Legionella negative.  MRSA negative.  Respiratory panel negative.  -We will discontinue antibiotics with comfort focused plan.     PAF  Supratherapeutic INR  -Warfarin held for supratherapeutic INR  -- continue metoprolol and cardizem     HTN  HLD  CAD s/p CABG  CHF  -- continued  losartan, metoprolol, pravastatin    Expected Discharge Location and Transportation: Home hospice  Expected Discharge   Expected Discharge Date: 7/21/2023; Expected Discharge Time:      DVT prophylaxis:  Medical DVT prophylaxis orders are present.     AM-PAC 6 Clicks Score (PT): 16 (07/20/23 7480)    CODE STATUS:   Code Status and Medical Interventions:   Ordered at: 07/20/23 2181     Level Of Support Discussed With:    Next of Kin (If No Surrogate)     Code Status (Patient has no  pulse and is not breathing):    No CPR (Do Not Attempt to Resuscitate)     Medical Interventions (Patient has pulse or is breathing):    Comfort Measures       Maria E Silva,   07/20/23

## 2023-07-20 NOTE — PLAN OF CARE
Goal Outcome Evaluation:  Plan of Care Reviewed With: patient, spouse, daughter, son        Progress: no change  Outcome Evaluation: Pt sat up in the chair today. Continues to get agitated  and restless. Pt reaching toward the ceiling this morning and mumbling. Continues on Ativan & Percocet. Plan is to discharge home tomorrow with hospice. Family to remain at bedside. Continue with POC

## 2023-07-20 NOTE — THERAPY EVALUATION
Patient Name: Phuc Bowden  : 1940    MRN: [id number]                              Today's Date: 2023       Admit Date: 2023    Visit Dx:     ICD-10-CM ICD-9-CM   1. Generalized weakness  R53.1 780.79   2. Right lower lobe lung mass  R91.8 786.6   3. Bronchiolitis  J21.9 466.19   4. Acute respiratory failure with hypoxia  J96.01 518.81   5. Generalized abdominal pain  R10.84 789.07   6. Acute midline low back pain without sciatica  M54.50 724.2   7. Cancer, metastatic to liver  C78.7 197.7   8. 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 Elevated INR  R79.1 790.92   10. Elevated lactic acid level  R79.89 276.2   11. Elevated brain natriuretic peptide (BNP) level  R79.89 790.99   12. Elevated bilirubin  R17 277.4   13. Impaired mobility and ADLs  Z74.09 V49.89    Z78.9      Patient Active Problem List   Diagnosis    Cancer associated pain    Right lower lobe lung mass    Metastasis    Coronary artery disease with history of myocardial infarction without history of CABG    COPD (chronic obstructive pulmonary disease)    Essential hypertension    Hyperlipidemia    Functional assessment declined    PAF (paroxysmal atrial fibrillation)    Pneumonia of right lung due to infectious organism    Supratherapeutic INR    Lactic acidosis    Leukocytosis    CHF (congestive heart failure)     Past Medical History:   Diagnosis Date    Anemia     Arthritis     Atrial fibrillation     Bowel obstruction     Bradycardia     CHF (congestive heart failure)     COPD (chronic obstructive pulmonary disease)     Coronary artery disease     GERD (gastroesophageal reflux disease)     Hearing loss     Hyperlipidemia     Injury of back     Myocardial infarction     Sciatica      Past Surgical History:   Procedure Laterality Date    CARDIAC SURGERY      2006    CORONARY ARTERY BYPASS GRAFT      EYE SURGERY      HEMORRHOIDECTOMY        General Information       Row  Name 07/20/23 0921          OT Time and Intention    Document Type evaluation  -SD     Mode of Treatment occupational therapy  -SD       Row Name 07/20/23 0921          General Information    Patient Profile Reviewed yes  -SD     Prior Level of Function independent:;all household mobility;bed mobility;feeding;grooming;min assist:;dressing;bathing  decline since end of June, prior to pt. was IND w/ADL's & functional mobility  -SD     Existing Precautions/Restrictions fall;shoulder;right;other (see comments)  torn rotator cuff in R shldr, painful to touch/use  -SD     Barriers to Rehab medically complex;hearing deficit  -SD       Row Name 07/20/23 0921          Living Environment    People in Home spouse  -SD       Row Name 07/20/23 0921          Home Main Entrance    Number of Stairs, Main Entrance other (see comments)  ramp  -SD       Row Name 07/20/23 0921          Stairs Within Home, Primary    Number of Stairs, Within Home, Primary none  -SD     Stair Railings, Within Home, Primary none  -SD       Row Name 07/20/23 0921          Cognition    Orientation Status (Cognition) oriented to;person;verbal cues/prompts needed for orientation;place;situation  -SD       Row Name 07/20/23 0921          Safety Issues, Functional Mobility    Safety Issues Affecting Function (Mobility) awareness of need for assistance;insight into deficits/self-awareness;safety precaution awareness;safety precautions follow-through/compliance;positioning of assistive device;sequencing abilities  -SD     Impairments Affecting Function (Mobility) balance;cognition;endurance/activity tolerance;pain;strength  -SD     Cognitive Impairments, Mobility Safety/Performance awareness, need for assistance;insight into deficits/self-awareness;safety precaution awareness;safety precaution follow-through;sequencing abilities  -SD               User Key  (r) = Recorded By, (t) = Taken By, (c) = Cosigned By      Initials Name Provider Type    Helen Jewell  SUZANNE Dwyer Occupational Therapist                     Mobility/ADL's       Row Name 07/20/23 0959          Bed Mobility    Bed Mobility rolling left;scooting/bridging;supine-sit  -SD     Rolling Left [place] (Bed Mobility) minimum assist (75% patient effort);verbal cues  -SD     Scooting/Bridging [place] (Bed Mobility) minimum assist (75% patient effort);verbal cues  -SD     Supine-Sit [place] (Bed Mobility) minimum assist (75% patient effort);verbal cues  -SD     Bed Mobility, Safety Issues decreased use of arms for pushing/pulling;impaired trunk control for bed mobility  -SD     Assistive Device (Bed Mobility) bed rails;head of bed elevated  -SD       Row Name 07/20/23 0959          Transfers    Transfers sit-stand transfer;stand-sit transfer;bed-chair transfer  -SD       Row Name 07/20/23 0959          Bed-Chair Transfer    Bed-Chair [place] (Transfers) minimum assist (75% patient effort);2 person assist;verbal cues  -SD     Assistive Device (Bed-Chair Transfers) walker, front-wheeled  -SD       Row Name 07/20/23 0959          Sit-Stand Transfer    Sit-Stand [place] (Transfers) minimum assist (75% patient effort);2 person assist;verbal cues  -SD     Assistive Device (Sit-Stand Transfers) walker, front-wheeled  -SD       Row Name 07/20/23 0959          Stand-Sit Transfer    Stand-Sit [place] (Transfers) minimum assist (75% patient effort);2 person assist;verbal cues  -SD     Assistive Device (Stand-Sit Transfers) walker, front-wheeled  -SD       Row Name 07/20/23 0959          Functional Mobility    Functional Mobility- Ind. Level minimum assist (75% patient effort);2 person assist required;verbal cues required  -SD     Functional Mobility- Device walker, front-wheeled  -SD     Functional Mobility-Distance (Feet) 75  -SD     Functional Mobility- Safety Issues balance decreased during turns;step length decreased;loses balance backward  -SD       Row Name 07/20/23 0959           Activities of Daily Living    BADL Assessment/Intervention lower body dressing;grooming  -SD       Row Name 07/20/23 0959          Lower Body Dressing Assessment/Training    [place] Level (Lower Body Dressing) don;socks;maximum assist (25% patient effort);pants/bottoms;minimum assist (75% patient effort);other (see comments)  min A to pull pants up over hips, already partially on in the bed  -SD     Position (Lower Body Dressing) supported sitting;supported standing  -SD       Row Name 07/20/23 0959          Grooming Assessment/Training    [place] Level (Grooming) wash face, hands;set up  -SD     Position (Grooming) edge of bed sitting  -SD               User Key  (r) = Recorded By, (t) = Taken By, (c) = Cosigned By      Initials Name Provider Type    Helen Jewell OT Occupational Therapist                   Obj/Interventions       Row Name 07/20/23 1004          Sensory Assessment (Somatosensory)    Sensory Assessment (Somatosensory) UE sensation intact  -SD       Row Name 07/20/23 1004          Vision Assessment/Intervention    Visual Impairment/Limitations WFL  -SD       [facility] Name 07/20/23 1004          Range of Motion Comprehensive    General Range of Motion bilateral upper extremity ROM WFL  -SD     Comment, General Range of Motion R UE AROM: shldr flex/abd limited to 90 degrees d/t rotator cuff injury, otherwise WFL for R UE; L UE WFL  -SD       [facility] Name 07/20/23 1004          Strength Comprehensive (MMT)    Comment, General Manual Muscle Testing (MMT) Assessment R UE Strength: 4/5 proximally, 4+/5 distally; L UE Strength: 5/5 grossly  -SD       [facility] Name 07/20/23 1004          Balance    Balance Assessment sitting static balance;sitting dynamic balance;sit to stand dynamic balance;standing static balance;standing dynamic balance  -SD     Static Sitting Balance minimal assist  -SD     Dynamic Sitting Balance moderate assist  -SD     Position, Sitting Balance supported;sitting edge of bed   -SD     Sit to Stand Dynamic Balance minimal assist;2-person assist;verbal cues  -SD     Static Standing Balance minimal assist;2-person assist  -SD     Dynamic Standing Balance minimal assist;2-person assist  -SD     Position/Device Used, Standing Balance supported;walker, front-wheeled  -SD               User Key  (r) = Recorded By, (t) = Taken By, (c) = Cosigned By      Initials Name Provider Type    SD Helen Trinh, OT Occupational Therapist                   Goals/Plan       Row Name 07/20/23 1010          Bed Mobility Goal 1 (OT)    Activity/Assistive Device (Bed Mobility Goal 1, OT) sit to supine/supine to sit  -SD     [place] Level/Cues Needed (Bed Mobility Goal 1, OT) contact guard required  -SD     Time Frame (Bed Mobility Goal 1, OT) long term goal (LTG);10 days  -SD     Progress/Outcomes (Bed Mobility Goal 1, OT) goal ongoing  -SD       Row Name 07/20/23 1010          Transfer Goal 1 (OT)    Activity/Assistive Device (Transfer Goal 1, OT) sit-to-stand/stand-to-sit;bed-to-chair/chair-to-bed;toilet;shower chair;walker, rolling  -SD     [place] Level/Cues Needed (Transfer Goal 1, OT) minimum assist (75% or more patient effort)  -SD     Time Frame (Transfer Goal 1, OT) long term goal (LTG);10 days  -SD     Progress/Outcome (Transfer Goal 1, OT) goal ongoing  -SD       Row Name 07/20/23 1010          Dressing Goal 1 (OT)    Activity/Device (Dressing Goal 1, OT) upper body dressing;lower body dressing  -SD     [place]/Cues Needed (Dressing Goal 1, OT) minimum assist (75% or more patient effort);moderate assist (50-74% patient effort);verbal cues required  -SD     Time Frame (Dressing Goal 1, OT) long term goal (LTG);10 days  -SD     Progress/Outcome (Dressing Goal 1, OT) goal ongoing  -SD       Row Name 07/20/23 1010          Therapy Assessment/Plan (OT)    Planned Therapy Interventions (OT) activity tolerance training;adaptive equipment training;BADL retraining;cognitive/visual  perception retraining;occupation/activity based interventions;patient/caregiver education/training;ROM/therapeutic exercise;strengthening exercise;transfer/mobility retraining  -SD               User Key  (r) = Recorded By, (t) = Taken By, (c) = Cosigned By      Initials Name Provider Type    Helen Jewell, OT Occupational Therapist                   Clinical Impression       Row Name 07/20/23 1006          Pain Assessment    Pretreatment Pain Rating 0/10 - no pain  -SD     Posttreatment Pain Rating 0/10 - no pain  -SD       Row Name 07/20/23 1006          Plan of Care Review    Plan of Care Reviewed With patient;daughter  -SD     Progress no change  -SD     Outcome Evaluation OT IE completed. Pt. presents below functional baseline in balance, strength, cognition, activity tolerance, and ADL/functional mobility independence. OT skilled services are warranted to return pt. to PLOF. Pt. to d/c home tomorrow with Hospice, per pt.'s dtr.  -SD       Row Name 07/20/23 1006          Therapy Assessment/Plan (OT)    Rehab Potential (OT) other (see comments)  pt. going home with Hospice  -SD     Criteria for Skilled Therapeutic Interventions Met (OT) yes;meets criteria;skilled treatment is necessary  -SD     Therapy Frequency (OT) daily  -SD       Row Name 07/20/23 1006          Therapy Plan Review/Discharge Plan (OT)    Equipment Needs Upon Discharge (OT) shower chair  -SD     Anticipated Discharge Disposition (OT) other (see comments)  Hospice @home  -SD       Row Name 07/20/23 1006          Vital Signs    Pre Systolic BP Rehab 95  -SD     Pre Treatment Diastolic BP 73  -SD     Post Systolic BP Rehab 96  -SD     Post Treatment Diastolic BP 54  -SD     Posttreatment Heart Rate (beats/min) 149  -SD     O2 Delivery Pre Treatment room air  -SD     O2 Delivery Intra Treatment room air  -SD     Post SpO2 (%) 94  -SD     O2 Delivery Post Treatment room air  -SD     Pre Patient Position Supine  -SD     Intra Patient  Position Standing  -SD     Post Patient Position Sitting  -SD       Row Name 07/20/23 1006          Positioning and Restraints    Pre-Treatment Position in bed  -SD     Post Treatment Position chair  -SD     In Chair notified nsg;reclined;call light within reach;encouraged to call for assist;exit alarm on;with family/caregiver;waffle cushion;on mechanical lift sling;legs elevated  -SD               User Key  (r) = Recorded By, (t) = Taken By, (c) = Cosigned By      Initials Name Provider Type    Helen Jewell OT Occupational Therapist                   Outcome Measures       Row Name 07/20/23 0921          How much help from another is currently needed...    Putting on and taking off regular lower body clothing? 2  -SD     Bathing (including washing, rinsing, and drying) 2  -SD     Toileting (which includes using toilet bed pan or urinal) 2  -SD     Putting on and taking off regular upper body clothing 3  -SD     Taking care of personal grooming (such as brushing teeth) 3  -SD     Eating meals 3  -SD     AM-PAC 6 Clicks Score (OT) 15  -SD       Row Name 07/20/23 0921          Functional Assessment    Outcome Measure Options AM-PAC 6 Clicks Daily Activity (OT)  -SD               User Key  (r) = Recorded By, (t) = Taken By, (c) = Cosigned By      Initials Name Provider Type    Helen Jewell OT Occupational Therapist                    Occupational Therapy Education       Title: PT OT SLP Therapies (Done)       Topic: Occupational Therapy (Done)       Point: ADL training (Done)       Description:   Instruct learner(s) on proper safety adaptation and remediation techniques during self care or transfers.   Instruct in proper use of assistive devices.                  Learning Progress Summary             Patient Acceptance, E, VU,NR by SD at 7/20/2023 1013    Comment: Pt. & dtr. educated on role of OT and adaptive equipment needs for transition home with Hospice. Dtr. verbalized understanding.    Family Acceptance, E, VU,NR by SD at 7/20/2023 1013    Comment: Pt. & dtr. educated on role of OT and adaptive equipment needs for transition home with Hospice. Dtr. verbalized understanding.                         Point: Home exercise program (Done)       Description:   Instruct learner(s) on appropriate technique for monitoring, assisting and/or progressing therapeutic exercises/activities.                  Learning Progress Summary             Patient Acceptance, E, VU,NR by SD at 7/20/2023 1013    Comment: Pt. & dtr. educated on role of OT and adaptive equipment needs for transition home with Hospice. Dtr. verbalized understanding.   Family Acceptance, E, VU,NR by SD at 7/20/2023 1013    Comment: Pt. & dtr. educated on role of OT and adaptive equipment needs for transition home with Hospice. Dtr. verbalized understanding.                         Point: Precautions (Done)       Description:   Instruct learner(s) on prescribed precautions during self-care and functional transfers.                  Learning Progress Summary             Patient Acceptance, E, VU,NR by SD at 7/20/2023 1013    Comment: Pt. & dtr. educated on role of OT and adaptive equipment needs for transition home with Hospice. Dtr. verbalized understanding.   Family Acceptance, E, VU,NR by SD at 7/20/2023 1013    Comment: Pt. & dtr. educated on role of OT and adaptive equipment needs for transition home with Hospice. Dtr. verbalized understanding.                         Point: Body mechanics (Done)       Description:   Instruct learner(s) on proper positioning and spine alignment during self-care, functional mobility activities and/or exercises.                  Learning Progress Summary             Patient Acceptance, E, VU,NR by SD at 7/20/2023 1013    Comment: Pt. & dtr. educated on role of OT and adaptive equipment needs for transition home with Hospice. Dtr. verbalized understanding.   Family Acceptance, E, VU,NR by SD at 7/20/2023 1013     Comment: Pt. & dtr. educated on role of OT and adaptive equipment needs for transition home with Hospice. Dtr. verbalized understanding.                                         User Key       Initials Effective Dates Name Provider Type Discipline    SD 07/11/23 -  Helen Trinh, OT Occupational Therapist OT                  OT Recommendation and Plan  Planned Therapy Interventions (OT): activity tolerance training, adaptive equipment training, BADL retraining, cognitive/visual perception retraining, occupation/activity based interventions, patient/caregiver education/training, ROM/therapeutic exercise, strengthening exercise, transfer/mobility retraining  Therapy Frequency (OT): daily  Plan of Care Review  Plan of Care Reviewed With: patient, daughter  Progress: no change  Outcome Evaluation: OT IE completed. Pt. presents below functional baseline in balance, strength, cognition, activity tolerance, and ADL/functional mobility independence. OT skilled services are warranted to return pt. to PLOF. Pt. to d/c home tomorrow with Hospice, per pt.'s dtr.     Time Calculation:   Evaluation Complexity (OT)  Review Occupational Profile/Medical/Therapy History Complexity: expanded/moderate complexity  Assessment, Occupational Performance/Identification of Deficit Complexity: 5 or more performance deficits  Clinical Decision Making Complexity (OT): detailed assessment/moderate complexity  Overall Complexity of Evaluation (OT): moderate complexity     Time Calculation- OT       Row Name 07/20/23 1014             Time Calculation- OT    OT Received On 07/20/23  -SD      OT Goal Re-Cert Due Date 07/30/23  -SD         Timed Charges    61419 - OT Self Care/Mgmt Minutes 8  -SD         Untimed Charges    OT Eval/Re-eval Minutes 46  -SD         Total Minutes    Timed Charges Total Minutes 8  -SD      Untimed Charges Total Minutes 46  -SD       Total Minutes 54  -SD                User Key  (r) = Recorded By, (t) = Taken  By, (c) = Cosigned By      Initials Name Provider Type    Helen Jewell, OT Occupational Therapist                  Therapy Charges for Today       Code Description Service Date Service Provider Modifiers Qty    84455775698 HC OT SELF CARE/MGMT/TRAIN EA 15 MIN 7/20/2023 Helen Trinh OT GO 1    55982489333 HC OT EVAL MOD COMPLEXITY 4 7/20/2023 Helen Trinh OT GO 1                 Helen Trinh OT  7/20/2023

## 2023-07-20 NOTE — PROGRESS NOTES
Continued Stay Note  [facility]     Patient Name: Phuc Bowden  MRN: [id number]  Today's Date: 7/20/2023    Admit Date: 7/18/2023    Plan: Home with [facility]   Discharge Plan       Row Name 07/20/23 1252       Plan    Plan Home with [facility]    Plan Comments Visit made to pt, pt sitting in recliner, pt's daughter Shellie, present. Plan remains for pt to discharge home tomorrow afternoon, Shellie will transport in private vehicle. Dr. Silva arrived during the visit, Dr. Silva agreeable with tomorrow's discharge, stated will send the scripts for pt's comfort meds to meds to bed tomorrow, to be delivered to pt prior to discharge. Shellie asking for a walker with wheels to be delivered with the equipment tomorrow, update hospice DME on need for the walker. Will continue to follow. Please call [phone] if can be of further assistance.                   Discharge Codes    No documentation.                 Expected Discharge Date and Time       Expected Discharge Date Expected Discharge Time    Jul 25, 2023               Socorro Ruiz RN

## 2023-07-20 NOTE — THERAPY EVALUATION
Patient Name: Phuc Bowden  : 1940    MRN: [id number]                              Today's Date: 2023       Admit Date: 2023    Visit Dx:     ICD-10-CM ICD-9-CM   1. Generalized weakness  R53.1 780.79   2. Right lower lobe lung mass  R91.8 786.6   3. Bronchiolitis  J21.9 466.19   4. Acute respiratory failure with hypoxia  J96.01 518.81   5. Generalized abdominal pain  R10.84 789.07   6. Acute midline low back pain without sciatica  M54.50 724.2   7. Cancer, metastatic to liver  C78.7 197.7   8. 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 Elevated INR  R79.1 790.92   10. Elevated lactic acid level  R79.89 276.2   11. Elevated brain natriuretic peptide (BNP) level  R79.89 790.99   12. Elevated bilirubin  R17 277.4   13. Impaired mobility and ADLs  Z74.09 V49.89    Z78.9      Patient Active Problem List   Diagnosis    Cancer associated pain    Right lower lobe lung mass    Metastasis    Coronary artery disease with history of myocardial infarction without history of CABG    COPD (chronic obstructive pulmonary disease)    Essential hypertension    Hyperlipidemia    Functional assessment declined    PAF (paroxysmal atrial fibrillation)    Pneumonia of right lung due to infectious organism    Supratherapeutic INR    Lactic acidosis    Leukocytosis    CHF (congestive heart failure)     Past Medical History:   Diagnosis Date    Anemia     Arthritis     Atrial fibrillation     Bowel obstruction     Bradycardia     CHF (congestive heart failure)     COPD (chronic obstructive pulmonary disease)     Coronary artery disease     GERD (gastroesophageal reflux disease)     Hearing loss     Hyperlipidemia     Injury of back     Myocardial infarction     Sciatica      Past Surgical History:   Procedure Laterality Date    CARDIAC SURGERY      2006    CORONARY ARTERY BYPASS GRAFT      EYE SURGERY      HEMORRHOIDECTOMY        General Information       Row  Name 07/20/23 0922          Physical Therapy Time and Intention    Document Type evaluation  -NS     Mode of Treatment physical therapy  -NS       Row Name 07/20/23 0922          General Information    Patient Profile Reviewed yes  -NS     Prior Level of Function independent:;all household mobility;gait;transfer;bed mobility;min assist:;ADL's  recent functional decline, recently has been using standard walker instead of SPC.  -NS     Existing Precautions/Restrictions fall;other (see comments)  R RTC injury  -NS     Barriers to Rehab medically complex;hearing deficit  -NS       Row Name 07/20/23 0922          Living Environment    People in Home spouse  -NS       Row Name 07/20/23 0922          Home Main Entrance    Number of Stairs, Main Entrance other (see comments)  ramp to enter  -NS       Row Name 07/20/23 0922          Stairs Within Home, Primary    Number of Stairs, Within Home, Primary none  -NS       Row Name 07/20/23 0922          Cognition    Orientation Status (Cognition) oriented to;person;verbal cues/prompts needed for orientation;place;time  -NS       Row Name 07/20/23 0922          Safety Issues, Functional Mobility    Safety Issues Affecting Function (Mobility) insight into deficits/self-awareness;safety precaution awareness;safety precautions follow-through/compliance;sequencing abilities  -NS     Impairments Affecting Function (Mobility) balance;cognition;endurance/activity tolerance;coordination;motor planning;postural/trunk control;strength;range of motion (ROM);pain  -NS     Cognitive Impairments, Mobility Safety/Performance problem-solving/reasoning;safety precaution awareness;safety precaution follow-through;sequencing abilities;judgment;insight into deficits/self-awareness;awareness, need for assistance  -NS               User Key  (r) = Recorded By, (t) = Taken By, (c) = Cosigned By      Initials Name Provider Type    Marzena Mclaughlin PT Physical Therapist                   Mobility        Row Name 07/20/23 0922          Bed Mobility    Bed Mobility supine-sit  -NS     Supine-Sit [place] (Bed Mobility) minimum assist (75% patient effort);verbal cues  -NS     Assistive Device (Bed Mobility) bed rails;head of bed elevated  -NS       Row Name 07/20/23 0922          Transfers    Comment, (Transfers) Cues for hand placement. Strong posterior lean upon initial stand but able to correct with time and verbal/visual cues.  -NS       Row Name 07/20/23 0922          Sit-Stand Transfer    Sit-Stand [place] (Transfers) minimum assist (75% patient effort);2 person assist;verbal cues  -NS     Assistive Device (Sit-Stand Transfers) walker, front-wheeled  -NS       Row Name 07/20/23 0922          Gait/Stairs (Locomotion)    [place] Level (Gait) minimum assist (75% patient effort);2 person assist;verbal cues  -NS     Assistive Device (Gait) walker, front-wheeled  -NS     Distance in Feet (Gait) 60  -NS     Deviations/Abnormal Patterns (Gait) [place] decreased;festinating/shuffling;gait speed decreased;stride length decreased;base of support, narrow  -NS     Bilateral Gait Deviations forward flexed posture;heel strike decreased  -NS     Comment, (Gait/Stairs) Patient ambulated at slow pace, demonstrating short shuffled steps. He required cues for safety with turns. Mild posterior lean noted during ambulation.  -NS               User Key  (r) = Recorded By, (t) = Taken By, (c) = Cosigned By      Initials Name Provider Type    Marzena Mclaughlin PT Physical Therapist                   Obj/Interventions       Row Name 07/20/23 0922          Range of Motion Comprehensive    General Range of Motion bilateral lower extremity ROM WFL  -NS       Row Name 07/20/23 0922          Strength Comprehensive (MMT)    General Manual Muscle Testing (MMT) Assessment lower extremity strength deficits identified  -NS     Comment, General Manual Muscle Testing (MMT) Assessment BLEs: grossly 4-/5  -NS       Row Name 07/20/23 0922           Balance    Balance Assessment sitting static balance;sitting dynamic balance;standing static balance;standing dynamic balance  -NS     Static Sitting Balance moderate assist  -NS     Dynamic Sitting Balance moderate assist  -NS     Position, Sitting Balance unsupported;sitting edge of bed  -NS     Static Standing Balance minimal assist;2-person assist  -NS     Dynamic Standing Balance minimal assist;2-person assist  -NS     Position/Device Used, Standing Balance supported;walker, front-wheeled  -NS     Comment, Balance Initially requiring Mod A for sitting EOB due to posterior lean, able to progress to Min A.  -NS       Row Name 07/20/23 0922          Sensory Assessment (Somatosensory)    Sensory Assessment (Somatosensory) LE sensation intact  -NS               User Key  (r) = Recorded By, (t) = Taken By, (c) = Cosigned By      Initials Name Provider Type    Marzena Mclaughlin, PT Physical Therapist                   Goals/Plan       Row Name 07/20/23 0922          Bed Mobility Goal 1 (PT)    Activity/Assistive Device (Bed Mobility Goal 1, PT) sit to supine/supine to sit  -NS     [place] Level/Cues Needed (Bed Mobility Goal 1, PT) standby assist  -NS     Time Frame (Bed Mobility Goal 1, PT) long term goal (LTG);2 weeks  -NS       Row Name 07/20/23 0922          Transfer Goal 1 (PT)    Activity/Assistive Device (Transfer Goal 1, PT) sit-to-stand/stand-to-sit;bed-to-chair/chair-to-bed  -NS     [place] Level/Cues Needed (Transfer Goal 1, PT) standby assist  -NS     Time Frame (Transfer Goal 1, PT) long term goal (LTG);2 weeks  -NS       Row Name 07/20/23 0922          Gait Training Goal 1 (PT)    Activity/Assistive Device (Gait Training Goal 1, PT) gait (walking locomotion);assistive device use  -NS     [place] Level (Gait Training Goal 1, PT) standby assist  -NS     Distance (Gait Training Goal 1, PT) 200  -NS     Time Frame (Gait Training Goal 1, PT) long term goal (LTG);2 weeks  -[facility]  Name 07/20/23 0922          Therapy Assessment/Plan (PT)    Planned Therapy Interventions (PT) balance training;bed mobility training;gait training;home exercise program;neuromuscular re-education;patient/family education;strengthening;transfer training  -NS               User Key  (r) = Recorded By, (t) = Taken By, (c) = Cosigned By      Initials Name Provider Type    Marzena Mclaughlin, PT Physical Therapist                   Clinical Impression       Row Name 07/20/23 0922          Pain    Pretreatment Pain Rating 0/10 - no pain  -NS     Posttreatment Pain Rating 0/10 - no pain  -NS     Pre/Posttreatment Pain Comment Patient reported mild pain with turning to sit in chair but otherwise reported no pain with activity.  -NS       Row Name 07/20/23 0922          Plan of Care Review    Plan of Care Reviewed With patient;daughter  -NS     Progress no change  -NS     Outcome Evaluation Patient presents with weakness, balance deficits, cognitive impairments, and decreased trunk control affecting functional mobility. Skilled IP PT services warranted to address deficits and support return to baseline. Pt's plan is home with hospice per daughter. Recommend 24/7 supervision, HHPT, and a front wheeled walker at discharge.  -NS       Row Name 07/20/23 0922          Therapy Assessment/Plan (PT)    Patient/Family Therapy Goals Statement (PT) to get stronger  -NS     Rehab Potential (PT) good, to achieve stated therapy goals  -NS     Criteria for Skilled Interventions Met (PT) yes  -NS     Therapy Frequency (PT) daily  -NS       Row Name 07/20/23 0922          Vital Signs    Pre Systolic BP Rehab 95  -NS     Pre Treatment Diastolic BP 73  -NS     Post Systolic BP Rehab 96  -NS     Post Treatment Diastolic BP 57  -NS     Pretreatment Heart Rate (beats/min) 133   a- fib  -NS     Posttreatment Heart Rate (beats/min) 140   a fib  -NS     Pre SpO2 (%) 95  -NS     O2 Delivery Pre Treatment room air  -NS     Post SpO2 (%) 93  -NS     O2  Delivery Post Treatment room air  -NS     Pre Patient Position Supine  -NS     Intra Patient Position Standing  -NS     Post Patient Position Sitting  -NS       Row Name 07/20/23 0922          Positioning and Restraints    Pre-Treatment Position in bed  -NS     Post Treatment Position chair  -NS     In Chair notified nsg;reclined;call light within reach;encouraged to call for assist;exit alarm on;on mechanical lift sling;waffle cushion;heels elevated;legs elevated;with family/caregiver  -NS               User Key  (r) = Recorded By, (t) = Taken By, (c) = Cosigned By      Initials Name Provider Type    Marzena Mclaughlin, SIDRA Physical Therapist                   Outcome Measures       Row Name 07/20/23 0922          How much help from another person do you currently need...    Turning from your back to your side while in flat bed without using bedrails? 3  -NS     Moving from lying on back to sitting on the side of a flat bed without bedrails? 2  -NS     Moving to and from a bed to a chair (including a wheelchair)? 3  -NS     Standing up from a chair using your arms (e.g., wheelchair, bedside chair)? 3  -NS     Climbing 3-5 steps with a railing? 2  -NS     To walk in hospital room? 3  -NS     AM-PAC 6 Clicks Score (PT) 16  -NS     Highest level of mobility 5 --> Static standing  -NS       Row Name 07/20/23 0922 07/20/23 0921       Functional Assessment    Outcome Measure Options AM-PAC 6 Clicks Basic Mobility (PT)  -NS AM-PAC 6 Clicks Daily Activity (OT)  -SD              User Key  (r) = Recorded By, (t) = Taken By, (c) = Cosigned By      Initials Name Provider Type    Helen Jewell, OT Occupational Therapist    Marzena Mclaughlin, PT Physical Therapist                                 Physical Therapy Education       Title: PT OT SLP Therapies (In Progress)       Topic: Physical Therapy (In Progress)       Point: Mobility training (Done)       Learning Progress Summary             Patient Acceptance, E, VU,NR  by NS at 7/20/2023 1017    Comment: safety with ambulation and transfers, benefits of RW for home.   Family Acceptance, E, VU,NR by NS at 7/20/2023 1017    Comment: safety with ambulation and transfers, benefits of RW for home.                         Point: Home exercise program (Not Started)       Learner Progress:  Not documented in this visit.              Point: Body mechanics (Done)       Learning Progress Summary             Patient Acceptance, E, VU,NR by NS at 7/20/2023 1017    Comment: safety with ambulation and transfers, benefits of RW for home.   Family Acceptance, E, VU,NR by NS at 7/20/2023 1017    Comment: safety with ambulation and transfers, benefits of RW for home.                         Point: Precautions (Done)       Learning Progress Summary             Patient Acceptance, E, VU,NR by NS at 7/20/2023 1017    Comment: safety with ambulation and transfers, benefits of RW for home.   Family Acceptance, E, VU,NR by NS at 7/20/2023 1017    Comment: safety with ambulation and transfers, benefits of RW for home.                                         User Key       Initials Effective Dates Name Provider Type Discipline    NS 06/16/21 -  Marzena Tucker, PT Physical Therapist PT                  PT Recommendation and Plan  Planned Therapy Interventions (PT): balance training, bed mobility training, gait training, home exercise program, neuromuscular re-education, patient/family education, strengthening, transfer training  Plan of Care Reviewed With: patient, daughter  Progress: no change  Outcome Evaluation: Patient presents with weakness, balance deficits, cognitive impairments, and decreased trunk control affecting functional mobility. Skilled IP PT services warranted to address deficits and support return to baseline. Pt's plan is home with hospice per daughter. Recommend 24/7 supervision, HHPT, and a front wheeled walker at discharge.     Time Calculation:   PT Evaluation Complexity  History, PT  Evaluation Complexity: 3 or more personal factors and/or comorbidities  Examination of Body Systems (PT Eval Complexity): total of 4 or more elements  Clinical Presentation (PT Evaluation Complexity): evolving  Clinical Decision Making (PT Evaluation Complexity): moderate complexity  Overall Complexity (PT Evaluation Complexity): moderate complexity     PT Charges       Row Name 07/20/23 0922             Time Calculation    Start Time 0922  -NS      PT Received On 07/20/23  -NS      PT Goal Re-Cert Due Date 07/30/23  -NS         Untimed Charges    PT Eval/Re-eval Minutes 48  -NS         Total Minutes    Untimed Charges Total Minutes 48  -NS       Total Minutes 48  -NS                User Key  (r) = Recorded By, (t) = Taken By, (c) = Cosigned By      Initials Name Provider Type    Marzena Mclaughlin, PT Physical Therapist                  Therapy Charges for Today       Code Description Service Date Service Provider Modifiers Qty    88321527353 HC PT EVAL MOD COMPLEXITY 4 7/20/2023 Marzena Tucker, PT GP 1            PT G-Codes  Outcome Measure Options: AM-PAC 6 Clicks Basic Mobility (PT)  AM-PAC 6 Clicks Score (PT): 16  AM-PAC 6 Clicks Score (OT): 15  PT Discharge Summary  Anticipated Discharge Disposition (PT): home with 24/7 care, home with home health    Marzena Tucker PT  7/20/2023

## 2023-07-20 NOTE — PLAN OF CARE
Problem: Pain Acute  Goal: Acceptable Pain Control and Functional Ability  Intervention: Optimize Psychosocial Wellbeing  Recent Flowsheet Documentation  Taken 7/20/2023 1525 by Shila Andersen RN  Supportive Measures:   active listening utilized   decision-making supported   goal-setting facilitated   verbalization of feelings encouraged   problem-solving facilitated     Problem: Palliative Care  Goal: Enhanced Quality of Life  Intervention: Optimize Psychosocial Wellbeing  Flowsheets (Taken 7/20/2023 1525)  Supportive Measures:   active listening utilized   decision-making supported   goal-setting facilitated   verbalization of feelings encouraged   problem-solving facilitated  Family/Support System Care:   caregiver stress acknowledged   support provided   involvement promoted   presence promoted   Goal Outcome Evaluation:  Plan of Care Reviewed With: patient, daughter, son        Progress: no change  Outcome Evaluation: Pt is up in the chair. Pt was restless and has not slept in several nights. Pt had episodes of halluncinations last night seeing spiders and bugs. Pt denies halluinations today.  Pt is up to the bsc with assist. Pt had a continent loose bm.  Goal is comfort and family agreeable to d/c tele and pulse ox and change to comfort measures. Dr Silva in agreement. Plans for home with hospice tomorrow. Advised hospice liason that pt will need rolling walker at home and pull ups. Family reports pt was checked for urine retention last night due to agitation. Pt has sleep deprivation/delirium vs terminal restlessness. RN reports pt is reaching in the air. Pt states he is ready to go home.  Ativan 0.25 mg not effective.  Ativan 0.5mg ordered for agitation/restlessness. Pain controlled with prn percocet. Pt reports mild pain with standing.  Ativan and percocet escribed for home. Pt may need prn haldol for hallucinations if they continue.  Dtr Shellie reports ativan did help with hallucinations.

## 2023-07-20 NOTE — PROGRESS NOTES
"Pharmacy Consult  -  Warfarin    Phuc Bowden is a  82 y.o. male   Height - 177.8 cm (70\")  Weight - 69.9 kg (154 lb)    Consulting Provider: Charis Iniguez APRN   Indication: Afib   Goal INR: 2-3  Home Regimen:   - warfarin 3.5mg daily     Bridge Therapy: No     Drug-Drug Interactions with current regimen:  Pravastatin (PTA med) - may enhance AC effects of warfarin   Zosyn - may enhance AC effects of warfarin    Warfarin Dosing During Admission:  Date  7/18 7/19 7/20         INR  5.91 3.49 1.73         Dose  HOLD  PO Vit K 5mg  0 3.5mg            Education Provided: patient hard of hearing. Warfarin education provided on 7/19/23 verbally and in writing.  Discussed effects of warfarin, importance of checking INR, drug-drug and drug-food interactions, and signs/symptoms of bleeding and clotting.  Patient/family verbalized understanding through teach back.  All pertinent questions were answered.        Discharge Follow up:   Following Provider -   Blayne Adan MD, [facility]   Follow up time range or appointment - 2-3 days after hospital discharge    Labs:  Results from last 7 days   Lab Units 07/20/23  0747 07/19/23  0656 07/18/23  1251   INR  1.73* 3.49* 5.91*   HEMOGLOBIN g/dL 11.6* 11.7* 13.7   HEMATOCRIT % 34.9* 34.6* 42.1     Results from last 7 days   Lab Units 07/20/23  0747 07/19/23  1804 07/19/23  0656 07/18/23  1331   SODIUM mmol/L 136  --  137 136   POTASSIUM mmol/L 3.0* 3.7 3.6 4.1   CHLORIDE mmol/L 102  --  102 97*   CO2 mmol/L 20.0*  --  18.0* 22.0   BUN mg/dL 15  --  15 17   CREATININE mg/dL 0.47*  --  0.56* 0.98   CALCIUM mg/dL 9.3  --  8.9 10.3   BILIRUBIN mg/dL  --   --  1.6* 2.0*   ALK PHOS U/L  --   --  230* 270*   ALT (SGPT) U/L  --   --  12 16   AST (SGOT) U/L  --   --  24 28   GLUCOSE mg/dL 99  --  100* 115*     Current dietary intake: 7/19 meals no documented   Diet Order   Procedures    Diet: Regular/House Diet; Texture: Regular Texture (IDDSI 7); Fluid Consistency: Thin (IDDSI 0) "     Assessment/Plan:   Pharmacy to dose warfarin for Afib.   Goal INR 2-3.  7/20 INR - 1.73, decreased from 3.49  7/20 H/H - 11.6/34.9    Vitamin K 5mg 7/18 x1 (expect effect for 5-7 days)  Will restart warfarin 3.5mg daily   Monitor s/s bleeding, clinical status, dietary intake and drug-drug interactions  Follow daily INR and adjust dose accordingly    Thanks  Solomon Mendoza [facility]   7/20/2023  11:16 EDT

## 2023-07-20 NOTE — PLAN OF CARE
Goal Outcome Evaluation:  Plan of Care Reviewed With: daughter, patient        Progress: no change  Outcome Evaluation: Pt remains confused. APRN notified to receive extra dose of Ativan last night. Pt slept on off during the night. Pt given PRN Percocet, Melatonin, and Ativan last night. Pt to be d/c home with hospice on 7/21.

## 2023-07-20 NOTE — PLAN OF CARE
Problem: Adult Inpatient Plan of Care  Goal: Plan of Care Review  Recent Flowsheet Documentation  Taken 7/20/2023 1006 by Helen Trinh OT  Progress: no change  Plan of Care Reviewed With:   patient   daughter  Outcome Evaluation: OT IE completed. Pt. presents below functional baseline in balance, strength, cognition, activity tolerance, and ADL/functional mobility independence. OT skilled services are warranted to return pt. to PLOF. Pt. to d/c home tomorrow with Hospice, per pt.'s dtr.   Goal Outcome Evaluation:  Plan of Care Reviewed With: patient, daughter        Progress: no change  Outcome Evaluation: OT IE completed. Pt. presents below functional baseline in balance, strength, cognition, activity tolerance, and ADL/functional mobility independence. OT skilled services are warranted to return pt. to PLOF. Pt. to d/c home tomorrow with Hospice, per pt.'s dtr.      Anticipated Discharge Disposition (OT): other (see comments) (Hospice @home)

## 2023-07-20 NOTE — PROGRESS NOTES
"Palliative Care Daily Progress Note     C/C: Patient denies pain, appears restless.     S: Medical record reviewed. Follow up visit for medication effectiveness and GOC in the context of complex medical decision making. Events noted. Patient sitting up in chair, attempting to get out of chair, asking about going home, easily reoriented. Family at bedside. Daughter reports patient's pain to left hip and right shoulder improved with Percocet 7/5-325mg PO. Patient with hallucinations which appear to be improved with Ativan per daughter. Patient with one palliative radiation treatment to left hip/L2-3 yesterday.     ROS: +pain, left leg and right shoulder, pain with movement. +decreased PO intake. +debility, increased weakness and pain limiting mobility. Denies N/V, diarrhea, constipation, anxiety. ROS limited by patient's hearing deficit.      O: Code Status:   Code Status and Medical Interventions:   Ordered at: 07/18/23 1604     Medical Intervention Limits:    NO intubation (DNI)    NO cardioversion     Code Status (Patient has no pulse and is not breathing):    No CPR (Do Not Attempt to Resuscitate)     Medical Interventions (Patient has pulse or is breathing):    Limited Support      Advanced Directives: Advance Directive Status: Patient does not have advance directive   Goals of Care: Ongoing.   Palliative Performance Scale Score: 30%     BP 95/73 (BP Location: Left arm, Patient Position: Lying)   Pulse 112   Temp 97.5 °F (36.4 °C) (Oral)   Resp 18   Ht 177.8 cm (70\")   Wt 69.9 kg (154 lb)   SpO2 95%   BMI 22.10 kg/m²   No intake or output data in the 24 hours ending 07/20/23 1134    PE:  General Appearance:    Patient sitting in chair, awake, alert, [place], frail, A/C will appearing, cooperative, NAD   HEENT:    NC/AT, EOMI, anicteric, MMM, face relaxed   Neck:   supple, trachea midline, no JVD   Lungs:     CTA bilat, diminished in bases; respirations regular, even and unlabored; RR 16-18 on exam    Heart:    " RRR, normal S1 and S2, no M/R/G,    Abdomen:     Normal bowel sounds, soft, nontender, nondistended   G/U:   Deferred   MSK/Extremities:   Wasting, no edema   Pulses:   Pulses palpable and equal bilaterally   Skin:   Warm, dry   Neurologic:   [place], A/Ox1, pleasantly confused, reoriented easily, [place]   Psych:   intermittent agitation, appropriate     Meds: Reviewed and changes noted    Labs:   Results from last 7 days   Lab Units 07/20/23  0747   WBC 10*3/mm3 12.76*   HEMOGLOBIN g/dL 11.6*   HEMATOCRIT % 34.9*   PLATELETS 10*3/mm3 211     Results from last 7 days   Lab Units 07/20/23  0747   SODIUM mmol/L 136   POTASSIUM mmol/L 3.0*   CHLORIDE mmol/L 102   CO2 mmol/L 20.0*   BUN mg/dL 15   CREATININE mg/dL 0.47*   GLUCOSE mg/dL 99   CALCIUM mg/dL 9.3     Results from last 7 days   Lab Units 07/20/23  0747 07/19/23  1804 07/19/23  0656   SODIUM mmol/L 136  --  137   POTASSIUM mmol/L 3.0*   < > 3.6   CHLORIDE mmol/L 102  --  102   CO2 mmol/L 20.0*  --  18.0*   BUN mg/dL 15  --  15   CREATININE mg/dL 0.47*  --  0.56*   CALCIUM mg/dL 9.3  --  8.9   BILIRUBIN mg/dL  --   --  1.6*   ALK PHOS U/L  --   --  230*   ALT (SGPT) U/L  --   --  12   AST (SGOT) U/L  --   --  24   GLUCOSE mg/dL 99  --  100*    < > = values in this interval not displayed.     Imaging Results (Last 72 Hours)       Procedure Component Value Units Date/Time    MRI Lumbar Spine With & Without Contrast [[id number]] Collected: 07/18/23 1507     Updated: 07/18/23 1535    Narrative:        MRI LUMBAR SPINE W WO CONTRAST    Date of Exam: 7/18/2023 2:27 PM EDT    Indication: Low back pain.  Bilateral lower extremity weakness and decreased sensation.  Reported lesion in the lumbar spine from outside hospital..     Comparison: CT abdomen pelvis from June 7, 2023    Technique:  Routine multiplanar/multisequence sequence images of the lumbar spine were obtained before and after the uneventful administration of 14 mL Multihance.        Findings:  The alignment is  anatomic. The vertebral body heights appear normal. There are multiple T1 hypointense enhancing lesions most prominent within the L3 vertebral body most compatible with metastasis. There is an associated mild compression deformity at L3,   likely a pathological fracture. No epidural spread of tumor is identified. There are degenerative endplate changes at L3-4 and L4-5. There is disc desiccation at all levels, with moderate degenerative loss of disc height at L4-5. The conus terminates at   the T12-L1 level. The posterior paravertebral soft tissues are unremarkable.    L1-2: Mild disc bulge. Mild bilateral facet arthropathy. No spinal canal stenosis. No neural foraminal stenosis.    L2-3: Mild disc bulge eccentric to the left. Mild right and moderate facet arthropathy with ligamentum flavum infolding. Mild spinal canal stenosis. Mild right and mild to moderate left neural foraminal stenosis.    L3-4: Mild disc bulge. Moderate bilateral facet arthropathy with ligamentum flavum infolding. Mild to moderate spinal canal stenosis. Mild to moderate right and moderate left neural foramen stenosis.    L4-5: Moderate disc bulge. Moderate bilateral facet arthropathy with ligamentum flavum infolding. Mild spinal canal stenosis. Moderate right and mild to moderate left neural foraminal stenosis.    L5-S1: Mild disc bulge eccentric to the right narrowing the right lateral recess. Moderate right and mild left facet arthropathy. No spinal canal stenosis. Moderate right and mild left neural foraminal stenosis.      Impression:      Impression:  1.Multiple enhancing lesions within lumbar spine most prominent at L3, most compatible with osseous metastasis. There is an associated mild compression deformity at L3, likely a pathological fracture.  2.Mild to moderate multilevel degenerative changes of lumbar spine as described above.        Electronically Signed: Ruddy Rodriguez    7/18/2023 3:32 PM EDT    Workstation ID: [id number]    CT  "Abdomen Pelvis With Contrast [[id number]] Collected: 07/18/23 1405     Updated: 07/18/23 1420    Narrative:      CT ABDOMEN PELVIS W CONTRAST    Date of Exam: 7/18/2023 1:08 PM EDT    Indication: Low back pain with reported lesion to the bar spine at outside 20.  Weight loss and poor appetite.  Admitted 1 month ago side hospital for \"abdominal infection \".    Comparison: June 7, 2023    Technique: Axial CT images were obtained of the abdomen and pelvis following the uneventful intravenous administration of 85 mL Isovue-370. Reconstructed coronal and sagittal images were also obtained. Automated exposure control and iterative   construction methods were used.      Findings:  Hypodense lesions within the liver appear increased in size and number. An index lesion within the left hepatic lobe on image 33 measures 3.6 x 3.1 cm, previously 1.1 x 1.0 cm. The gallbladder, adrenal glands, right kidney, spleen, and pancreas are   unremarkable. There are small left renal cyst.    The stomach appears normal. The small bowel appears normal in caliber and configuration. The colon appears normal. The appendix appears normal. There is no ascites or loculated collection. No abnormally enlarged lymph nodes are identified.    The rectum and urinary bladder are unremarkable. The prostate is enlarged measuring 4.8 cm in transverse dimension.    Several osseous metastases have increased in size, for example an index lesion involving the right L3 vertebral body measuring 4.9 cm, previously 3.2 cm. Another metastasis is seen within the left ischium.      Impression:      Impression:  1.Progressive hypodense lesions within liver and progressive osseous metastasis, compatible with disease progression.  2.No acute process identified within abdomen/pelvis.  3.Enlarged prostate.        Electronically Signed: Ruddy Rodriguez    7/18/2023 2:17 PM EDT    Workstation ID: [id number]    CT Angiogram Chest [[id number]] Collected: 07/18/23 1340     " Updated: 07/18/23 1409    Narrative:      CT ANGIOGRAM CHEST    Date of Exam: 7/18/2023 1:08 PM EDT    Indication: Per family, oxygen level 80% this morning.  Weight loss over the past 1 month.  Possible malignancy..    Comparison: None available.    Technique: Axial pulmonary arterial phase IV contrast enhanced CT angiogram of the chest per PE protocol. Two-dimensional reconstructions were postprocessed. Axial IV contrast-enhanced CT of the abdomen and pelvis with multiplanar reconstruction.    Findings:  CTA chest: There is no pathologic axillary adenopathy or other worrisome body wall soft tissue finding in the chest. Trace pleural effusion is present on the right. There is no pericardial effusion. Atherosclerotic, nonaneurysmal thoracic aorta. The   pulmonary arteries are patent and well opacified, without evidence of filling defect concerning for acute pulmonary embolus. There is no distinct pathologic mediastinal adenopathy. Evaluation of the osseous structures demonstrates no evidence of acute   fracture or aggressive osseous lesion. Evaluation of the lung fields demonstrates fairly extensive confluent consolidation in the right lower lobe, with some evidence of endobronchial obstruction, with underlying mass not entirely excluded. The remaining   lung fields demonstrate moderate emphysema and mild diffuse peribronchial groundglass opacity, suggesting component of bronchiolitis.      Impression:      Impression:  Prominent consolidation is noted in the right lower lobe, somewhat masslike with evidence of endobronchial obstruction, concerning for underlying possible primary or metastatic neoplasm. There is otherwise no suspicious thoracic adenopathy.    Diffuse areas of centrilobular groundglass opacity are present, suggesting component of bronchiolitis. No additional acute findings are present. There is no acute pulmonary embolus.        Electronically Signed: Eder Mireles    7/18/2023 2:06 PM EDT     Workstation ID: [id number]                  Diagnostics: Reviewed    A:   Cancer associated pain    Right lower lobe lung mass    Metastasis    Coronary artery disease with history of myocardial infarction without history of CABG    COPD (chronic obstructive pulmonary disease)    Essential hypertension    Hyperlipidemia    Functional assessment declined    PAF (paroxysmal atrial fibrillation)    Pneumonia of right lung due to infectious organism    Supratherapeutic INR    Lactic acidosis    Leukocytosis    CHF (congestive heart failure)     82 y.o. 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 Agitation/Hallucinations -increased Lorazepam to 0.5mg PO q 4 hours prn agitation/hallucinations     3. Debility -due to disease progression, PT/OT unlikely to improve debility     4. Decreased PO intake -offer frequent snacks and supplements  -ordered Boost Plus vanilla or strawberry per patient preference  -SLP evaluation with regular texture, thin liquids     5. Constipation -prevention  -continue bowel regimen     6. GOC -DNR/DNI/Comfort Measures -per discussion with family  -patient/family electing comfort focused plan of care    P: Follow up visit for symptom management. Patient with hallucinations and agitation requiring increase in dose of Ativan. Pain well managed with Percocet 7/5-325mg PO. Plan is home with hospice tomorrow. Scripts to be sent for Percocet and Ativan per Palliative MD to [facility]. All questions and concerns addressed.   Palliative Care Team will continue to follow patient. Please do not hesitate to contact us regarding further sx mgmt or GOC needs.  Jazmine Jamison, APRN  7/20/2023  Time spent: 35 minutes

## 2023-07-21 VITALS
OXYGEN SATURATION: 97 % | RESPIRATION RATE: 16 BRPM | DIASTOLIC BLOOD PRESSURE: 56 MMHG | HEART RATE: 98 BPM | HEIGHT: 70 IN | SYSTOLIC BLOOD PRESSURE: 70 MMHG | TEMPERATURE: 96.1 F | BODY MASS INDEX: 22.05 KG/M2 | WEIGHT: 154 LBS

## 2023-07-21 PROBLEM — E87.20 LACTIC ACIDOSIS: Status: RESOLVED | Noted: 2023-07-18 | Resolved: 2023-07-21

## 2023-07-21 PROBLEM — I50.9 CHF (CONGESTIVE HEART FAILURE): Status: RESOLVED | Noted: 2023-07-18 | Resolved: 2023-07-21

## 2023-07-21 PROBLEM — D72.829 LEUKOCYTOSIS: Status: RESOLVED | Noted: 2023-07-18 | Resolved: 2023-07-21

## 2023-07-21 PROBLEM — J18.9 PNEUMONIA OF RIGHT LUNG DUE TO INFECTIOUS ORGANISM: Status: RESOLVED | Noted: 2023-07-18 | Resolved: 2023-07-21

## 2023-07-21 PROBLEM — J18.9 PNA (PNEUMONIA): Status: RESOLVED | Noted: 2023-07-21 | Resolved: 2023-07-21

## 2023-07-21 PROBLEM — J18.9 PNA (PNEUMONIA): Status: ACTIVE | Noted: 2023-07-21

## 2023-07-21 LAB
INR PPP: 1.73 (ref 0.89–1.12)
PROTHROMBIN TIME: 20.4 SECONDS (ref 12.2–14.5)

## 2023-07-21 PROCEDURE — 85610 PROTHROMBIN TIME: CPT | Performed by: STUDENT IN AN ORGANIZED HEALTH CARE EDUCATION/TRAINING PROGRAM

## 2023-07-21 PROCEDURE — 99239 HOSP IP/OBS DSCHRG MGMT >30: CPT | Performed by: INTERNAL MEDICINE

## 2023-07-21 RX ORDER — DILTIAZEM HYDROCHLORIDE 120 MG/1
120 CAPSULE, COATED, EXTENDED RELEASE ORAL DAILY
Qty: 30 CAPSULE | Refills: 0 | Status: SHIPPED | OUTPATIENT
Start: 2023-07-21

## 2023-07-21 RX ORDER — LOSARTAN POTASSIUM 25 MG/1
25 TABLET ORAL DAILY
Qty: 30 TABLET | Refills: 0 | Status: SHIPPED | OUTPATIENT
Start: 2023-07-21

## 2023-07-21 RX ADMIN — Medication 10 ML: at 08:35

## 2023-07-21 RX ADMIN — Medication 1 CAPSULE: at 08:34

## 2023-07-21 RX ADMIN — FOLIC ACID 1 MG: 1 TABLET ORAL at 08:34

## 2023-07-21 RX ADMIN — SENNOSIDES AND DOCUSATE SODIUM 2 TABLET: 50; 8.6 TABLET ORAL at 08:34

## 2023-07-21 RX ADMIN — OXYCODONE HYDROCHLORIDE AND ACETAMINOPHEN 1 TABLET: 7.5; 325 TABLET ORAL at 08:51

## 2023-07-21 RX ADMIN — DILTIAZEM HYDROCHLORIDE 120 MG: 120 CAPSULE, COATED, EXTENDED RELEASE ORAL at 08:34

## 2023-07-21 RX ADMIN — LORAZEPAM 0.5 MG: 0.5 TABLET ORAL at 10:35

## 2023-07-21 RX ADMIN — FAMOTIDINE 20 MG: 20 TABLET, FILM COATED ORAL at 08:34

## 2023-07-21 NOTE — PROGRESS NOTES
"Pharmacy Consult  -  Warfarin    Phuc Bowden is a  82 y.o. male   Height - 177.8 cm (70\")  Weight - 69.9 kg (154 lb)    Consulting Provider: Charis Iniguez APRN   Indication: Afib   Goal INR: 2-3  Home Regimen:   - warfarin 3.5mg daily     Bridge Therapy: No     Drug-Drug Interactions with current regimen:  Pravastatin (PTA med) - may enhance AC effects of warfarin       Warfarin Dosing During Admission:  Date  7/18 7/19 7/20 7/21        INR  5.91 3.49 1.73 1.73        Dose  HOLD  PO Vit K 5mg  0 3.5mg 3.5mg           Education Provided: patient hard of hearing. Warfarin education provided on 7/19/23 verbally and in writing.  Discussed effects of warfarin, importance of checking INR, drug-drug and drug-food interactions, and signs/symptoms of bleeding and clotting.  Patient/family verbalized understanding through teach back.  All pertinent questions were answered.        Discharge Follow up:   Following Provider -   Blayne Adan MD, [facility]   Follow up time range or appointment - 2-3 days after hospital discharge    Labs:  Results from last 7 days   Lab Units 07/21/23  0740 07/20/23  0747 07/19/23  0656 07/18/23  1251   INR  1.73* 1.73* 3.49* 5.91*   HEMOGLOBIN g/dL  --  11.6* 11.7* 13.7   HEMATOCRIT %  --  34.9* 34.6* 42.1     Results from last 7 days   Lab Units 07/20/23  2235 07/20/23  0747 07/19/23  1804 07/19/23  0656 07/18/23  1331   SODIUM mmol/L  --  136  --  137 136   POTASSIUM mmol/L 4.0 3.0* 3.7 3.6 4.1   CHLORIDE mmol/L  --  102  --  102 97*   CO2 mmol/L  --  20.0*  --  18.0* 22.0   BUN mg/dL  --  15  --  15 17   CREATININE mg/dL  --  0.47*  --  0.56* 0.98   CALCIUM mg/dL  --  9.3  --  8.9 10.3   BILIRUBIN mg/dL  --   --   --  1.6* 2.0*   ALK PHOS U/L  --   --   --  230* 270*   ALT (SGPT) U/L  --   --   --  12 16   AST (SGOT) U/L  --   --   --  24 28   GLUCOSE mg/dL  --  99  --  100* 115*     Current dietary intake: 7/20 0% of documented meals  Diet Order   Procedures    Diet: Regular/House " Diet; Texture: Regular Texture (IDDSI 7); Fluid Consistency: Thin (IDDSI 0)     Assessment/Plan:   Pharmacy to dose warfarin for Afib.   Goal INR 2-3.  7/21 INR - 1.73, unchanged  7/20 H/H - 11.6/34.9    Vitamin K 5mg 7/18 x1 (expect effect for 5-7 days)  Will continue warfarin 3.5mg daily   Monitor s/s bleeding, clinical status, dietary intake and drug-drug interactions  Follow daily INR and adjust dose accordingly    Thanks  Solomon Mendoza [facility]   7/21/2023  08:30 EDT

## 2023-07-21 NOTE — DISCHARGE PLACEMENT REQUEST
"Nayeli Acuna (82 y.o. Male)   Discharge Summary      Date of Birth   1940    Social Security Number       Address   [address]  [facility] [address]    Home Phone   [phone]    MRN   [id number]       Sikhism   None    Marital Status                               Admission Date   23    Admission Type   Emergency    Admitting Provider   Maria E Silva DO    Attending Provider   Maria E Silva DO    Department, Room/Bed   [facility] 6B, N645/1       Discharge Date       Discharge Disposition   Hospice/Home    Discharge Destination                                 Attending Provider: Maria E Silva DO    Allergies: Bee Venom    Isolation: None   Infection: None   Code Status: No CPR    Ht: 177.8 cm (70\")   Wt: 69.9 kg (154 lb)    Admission Cmt: None   Principal Problem: 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]                   Active Insurance as of 2023       Primary Coverage       Payor Plan Insurance Group Employer/Plan Group    ANTHEM MEDICARE REPLACEMENT ANTHEM MEDICARE ADVANTAGE [id number]       Payor Plan Address Payor Plan Phone Number Payor Plan Fax Number Effective Dates    [address] [phone]  2022 - None Entered    [facility] [address]         Subscriber Name Subscriber Birth Date Member ID       NAYELI ACUNA 1940 [id number]                     Emergency Contacts        (Rel.) Home Phone Work Phone Mobile Phone    Michelle Acuna (Spouse) [phone] -- --    Shellie Fowler (Daughter) [phone] -- --                 Discharge Summary        Maria E Silva DO at 23 [address] [place] [address] Medicine Services  DISCHARGE SUMMARY    Patient Name: Nayeli Acuna  : 1940  MRN: [id number]    Date of Admission: 2023  1:26 PM  Date of Discharge:  23  Primary Care Physician: Blayne Adan MD    Consults       Date and Time Order Name Status Description    2023 12:34 AM Inpatient " Radiation Oncology Consult Completed     7/19/2023 12:34 AM Inpatient Hematology & Oncology Consult      7/18/2023  5:55 PM Inpatient Palliative Care MD Consult Completed             Hospital Course     Presenting Problem: pain    Active Hospital Problems    Diagnosis  POA    **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]  Yes    Right lower lobe lung mass [R91.8]  Yes    Metastasis [C79.9]  Yes    Coronary artery disease with history of myocardial infarction without history of CABG [I25.10, I25.2]  Not Applicable    COPD (chronic obstructive pulmonary disease) [J44.9]  Yes    Essential hypertension [I10]  Yes    Hyperlipidemia [E78.5]  Yes    Functional assessment declined [Z53.20]  Not Applicable    PAF (paroxysmal atrial fibrillation) [I48.0]  Yes    Supratherapeutic INR [R79.1]  Yes      Resolved Hospital Problems    Diagnosis Date Resolved POA    PNA (pneumonia) [J18.9] 07/21/2023 Yes    Pneumonia of right lung due to infectious organism [J18.9] 07/21/2023 Yes    Lactic acidosis [E87.20] 07/21/2023 Yes    Leukocytosis [D72.829] 07/21/2023 Unknown    CHF (congestive heart failure) [I50.9] 07/21/2023 Unknown          Hospital Course:  Phuc Bowden is a 82 y.o. male with pmh significant for CAD s/p MI and CABG, PAF on warfarin, skin cancer, HTN, HLD, hearing loss, previous tobacco use long term with COPD, GERD, presents with worsening low back pain x 2 months.  Patient had a recent fall and evaluation at that time showed concerns for bony mets.  Imaging also with liver mets as well as a right lung mass.  Patient was to have a biopsy at  however this was not done due to warfarin.  He has had progressive pain and weakness prompting evaluation.     Intractable Back Pain with functional decline/ FTT  Right lung mass with liver/ spinal mets  -- recent discovery of lung mass in June upon OSH ED with pending work up through [facility], however pain and debility increasing  -Oncology consulted.  Reviewed treatment be palliative in  nature.  Family has opted to not undergo biopsy of right lung mass.  Patient received 1 dose of palliative radiation on 7/19 to L2-3 and left hip.  Palliative care was consulted and medications adjusted for comfort.  Hospice was consulted and patient was discharged home with hospice.     Right sided pneumonia, post obstructive  Leukocytosis, lactic acidosis  -- zosyn and vanc started in ED. then continued on Rocephin and Doxy.  -Blood cultures no growth to date.  Strep and Legionella negative.  MRSA negative.  Respiratory panel negative.  -We will discontinue antibiotics with comfort focused plan.     PAF  Supratherapeutic INR  -Continued metoprolol and diltiazem.  Discontinue warfarin on discharge     HTN  HLD  CAD s/p CABG  CHF  -- continued  losartan, metoprolol, pravastatin     Discharge Follow Up Recommendations for outpatient labs/diagnostics:  Home hospice to resume care  PCP Dr. Adan as needed    Day of Discharge     HPI:   Difficulty overnight with restlessness.  Currently more calm.  Daughter and wife at bedside.  Concerned about how they are going to get patient home for 2-hour drive with his restlessness.  Reviewed plans for home hospice and they are all agreeable to this.    Review of Systems  Unable to obtain due to mental status    Vital Signs:   Temp:  [94.7 °F (34.8 °C)-96.4 °F (35.8 °C)] 96.1 °F (35.6 °C)  Heart Rate:  [] 98  Resp:  [16-19] 16  BP: ()/(63-87) 113/87      Physical Exam:  Constitutional: restless  HENT: NCAT, mucous membranes moist  Respiratory: increased effort; on room air  Cardiovascular: tachy; irregular  Gastrointestinal: Positive bowel sounds, soft, nontender, nondistended  Musculoskeletal: No bilateral ankle edema  Psychiatric: somewhat restless.   Neurologic: strength symmetric in all extremities, Cranial Nerves grossly intact to confrontation, speech clear  Skin: No rashes      Pertinent  and/or Most Recent Results     LAB RESULTS:      Lab 07/20/23  0747  07/19/23  0656 07/18/23  1534 07/18/23  1331 07/18/23  1251   WBC 12.76* 12.67*  --   --  16.55*   HEMOGLOBIN 11.6* 11.7*  --   --  13.7   HEMATOCRIT 34.9* 34.6*  --   --  42.1   PLATELETS 211 206  --   --  284   NEUTROS ABS  --  10.63*  --   --  13.99*   IMMATURE GRANS (ABS)  --  0.16*  --   --  0.42*   LYMPHS ABS  --  0.78  --   --  0.87   MONOS ABS  --  1.07*  --   --  1.21*   EOS ABS  --  0.00  --   --  0.01   MCV 86.2 87.8  --   --  90.0   PROCALCITONIN  --   --   --  0.27*  --    LACTATE  --   --  1.4  --  2.6*   PROTIME 20.4* 35.3*  --   --  53.3*         Lab 07/20/23  2235 07/20/23  0747 07/19/23  1804 07/19/23  0656 07/18/23  1331 07/18/23  1253   SODIUM  --  136  --  137 136  --    POTASSIUM 4.0 3.0* 3.7 3.6 4.1  --    CHLORIDE  --  102  --  102 97*  --    CO2  --  20.0*  --  18.0* 22.0  --    ANION GAP  --  14.0  --  17.0* 17.0*  --    BUN  --  15  --  15 17  --    CREATININE  --  0.47*  --  0.56* 0.98 0.90   EGFR  --  104.4  --  99.0 77.5  --    GLUCOSE  --  99  --  100* 115*  --    CALCIUM  --  9.3  --  8.9 10.3  --    MAGNESIUM  --   --   --  1.8  --   --          Lab 07/19/23  0656 07/18/23  1331   TOTAL PROTEIN 5.7* 7.7   ALBUMIN 2.9* 3.6   GLOBULIN 2.8 4.1   ALT (SGPT) 12 16   AST (SGOT) 24 28   BILIRUBIN 1.6* 2.0*   ALK PHOS 230* 270*         Lab 07/20/23  0747 07/19/23  0656 07/18/23  1331 07/18/23  1251   PROBNP  --   --  11,337.0*  --    HSTROP T  --   --  21*  --    PROTIME 20.4* 35.3*  --  53.3*   INR 1.73* 3.49*  --  5.91*                 Brief Urine Lab Results  (Last result in the past 365 days)        Color   Clarity   Blood   Leuk Est   Nitrite   Protein   CREAT   Urine HCG        07/18/23 1548 Yellow   Clear   Trace   Negative   Negative   Trace                 Microbiology Results (last 10 days)       Procedure Component Value - Date/Time    MRSA Screen, PCR (Inpatient) - Swab, Nares [[id number]]  (Normal) Collected: 07/19/23 0542    Lab Status: Final result Specimen: Swab from Nares  Updated: 07/19/23 0840     MRSA PCR Negative    Narrative:      The negative predictive value of this diagnostic test is high and should only be used to consider de-escalating anti-MRSA therapy. A positive result may indicate colonization with MRSA and must be correlated clinically.  MRSA Negative    Respiratory Panel PCR w/COVID-19(SARS-CoV-2) CASSIDY/TAMELA/ARTURO/PAD/COR/MAD/GRETCHEN In-House, NP Swab in UTM/VTM, 3-4 HR TAT - Swab, Nasopharynx [[id number]]  (Normal) Collected: 07/18/23 1619    Lab Status: Final result Specimen: Swab from Nasopharynx Updated: 07/18/23 1716     ADENOVIRUS, PCR Not Detected     Coronavirus 229E Not Detected     Coronavirus HKU1 Not Detected     Coronavirus NL63 Not Detected     Coronavirus OC43 Not Detected     COVID19 Not Detected     Human Metapneumovirus Not Detected     Human Rhinovirus/Enterovirus Not Detected     Influenza A PCR Not Detected     Influenza B PCR Not Detected     Parainfluenza Virus 1 Not Detected     Parainfluenza Virus 2 Not Detected     Parainfluenza Virus 3 Not Detected     Parainfluenza Virus 4 Not Detected     RSV, PCR Not Detected     Bordetella pertussis pcr Not Detected     Bordetella parapertussis PCR Not Detected     Chlamydophila pneumoniae PCR Not Detected     Mycoplasma pneumo by PCR Not Detected    Narrative:      In the setting of a positive respiratory panel with a viral infection PLUS a negative procalcitonin without other underlying concern for bacterial infection, consider observing off antibiotics or discontinuation of antibiotics and continue supportive care. If the respiratory panel is positive for atypical bacterial infection (Bordetella pertussis, Chlamydophila pneumoniae, or Mycoplasma pneumoniae), consider antibiotic de-escalation to target atypical bacterial infection.    S. Pneumo Ag Urine or CSF - Urine, Urine, Clean Catch [[id number]]  (Normal) Collected: 07/18/23 1548    Lab Status: Final result Specimen: Urine, Clean Catch Updated: 07/19/23 0117  "    Strep Pneumo Ag Negative    Legionella Antigen, Urine - Urine, Urine, Clean Catch [[id number]]  (Normal) Collected: 07/18/23 1548    Lab Status: Final result Specimen: Urine, Clean Catch Updated: 07/19/23 0117     LEGIONELLA ANTIGEN, URINE Negative    Blood Culture - Blood, Arm, Right [[id number]]  (Normal) Collected: 07/18/23 1535    Lab Status: Preliminary result Specimen: Blood from Arm, Right Updated: 07/20/23 1600     Blood Culture No growth at 2 days    Blood Culture - Blood, Arm, Left [[id number]]  (Normal) Collected: 07/18/23 1530    Lab Status: Preliminary result Specimen: Blood from Arm, Left Updated: 07/20/23 1600     Blood Culture No growth at 2 days            CT Abdomen Pelvis With Contrast    Result Date: 7/18/2023  CT ABDOMEN PELVIS W CONTRAST Date of Exam: 7/18/2023 1:08 PM EDT Indication: Low back pain with reported lesion to the bar spine at outside 20.  Weight loss and poor appetite.  Admitted 1 month ago side hospital for \"abdominal infection \". Comparison: June 7, 2023 Technique: Axial CT images were obtained of the abdomen and pelvis following the uneventful intravenous administration of 85 mL Isovue-370. Reconstructed coronal and sagittal images were also obtained. Automated exposure control and iterative construction methods were used. Findings: Hypodense lesions within the liver appear increased in size and number. An index lesion within the left hepatic lobe on image 33 measures 3.6 x 3.1 cm, previously 1.1 x 1.0 cm. The gallbladder, adrenal glands, right kidney, spleen, and pancreas are unremarkable. There are small left renal cyst. The stomach appears normal. The small bowel appears normal in caliber and configuration. The colon appears normal. The appendix appears normal. There is no ascites or loculated collection. No abnormally enlarged lymph nodes are identified. The rectum and urinary bladder are unremarkable. The prostate is enlarged measuring 4.8 cm in transverse dimension. " Several osseous metastases have increased in size, for example an index lesion involving the right L3 vertebral body measuring 4.9 cm, previously 3.2 cm. Another metastasis is seen within the left ischium.     Impression: 1.Progressive hypodense lesions within liver and progressive osseous metastasis, compatible with disease progression. 2.No acute process identified within abdomen/pelvis. 3.Enlarged prostate. Electronically Signed: Ruddy Rodriguez  7/18/2023 2:17 PM EDT  Workstation ID: [id number]    CT Angiogram Chest    Result Date: 7/18/2023  CT ANGIOGRAM CHEST Date of Exam: 7/18/2023 1:08 PM EDT Indication: Per family, oxygen level 80% this morning.  Weight loss over the past 1 month.  Possible malignancy.. Comparison: None available. Technique: Axial pulmonary arterial phase IV contrast enhanced CT angiogram of the chest per PE protocol. Two-dimensional reconstructions were postprocessed. Axial IV contrast-enhanced CT of the abdomen and pelvis with multiplanar reconstruction. Findings: CTA chest: There is no pathologic axillary adenopathy or other worrisome body wall soft tissue finding in the chest. Trace pleural effusion is present on the right. There is no pericardial effusion. Atherosclerotic, nonaneurysmal thoracic aorta. The pulmonary arteries are patent and well opacified, without evidence of filling defect concerning for acute pulmonary embolus. There is no distinct pathologic mediastinal adenopathy. Evaluation of the osseous structures demonstrates no evidence of acute fracture or aggressive osseous lesion. Evaluation of the lung fields demonstrates fairly extensive confluent consolidation in the right lower lobe, with some evidence of endobronchial obstruction, with underlying mass not entirely excluded. The remaining  lung fields demonstrate moderate emphysema and mild diffuse peribronchial groundglass opacity, suggesting component of bronchiolitis.     Impression: Prominent consolidation is noted in  the right lower lobe, somewhat masslike with evidence of endobronchial obstruction, concerning for underlying possible primary or metastatic neoplasm. There is otherwise no suspicious thoracic adenopathy. Diffuse areas of centrilobular groundglass opacity are present, suggesting component of bronchiolitis. No additional acute findings are present. There is no acute pulmonary embolus. Electronically Signed: Eder Mireles  7/18/2023 2:06 PM EDT  Workstation ID: [id number]    MRI Lumbar Spine With & Without Contrast    Result Date: 7/18/2023  MRI LUMBAR SPINE W WO CONTRAST Date of Exam: 7/18/2023 2:27 PM EDT Indication: Low back pain.  Bilateral lower extremity weakness and decreased sensation.  Reported lesion in the lumbar spine from outside hospital..  Comparison: CT abdomen pelvis from June 7, 2023 Technique:  Routine multiplanar/multisequence sequence images of the lumbar spine were obtained before and after the uneventful administration of 14 mL Multihance.  Findings: The alignment is anatomic. The vertebral body heights appear normal. There are multiple T1 hypointense enhancing lesions most prominent within the L3 vertebral body most compatible with metastasis. There is an associated mild compression deformity at L3,  likely a pathological fracture. No epidural spread of tumor is identified. There are degenerative endplate changes at L3-4 and L4-5. There is disc desiccation at all levels, with moderate degenerative loss of disc height at L4-5. The conus terminates at  the T12-L1 level. The posterior paravertebral soft tissues are unremarkable. L1-2: Mild disc bulge. Mild bilateral facet arthropathy. No spinal canal stenosis. No neural foraminal stenosis. L2-3: Mild disc bulge eccentric to the left. Mild right and moderate facet arthropathy with ligamentum flavum infolding. Mild spinal canal stenosis. Mild right and mild to moderate left neural foraminal stenosis. L3-4: Mild disc bulge. Moderate bilateral facet  arthropathy with ligamentum flavum infolding. Mild to moderate spinal canal stenosis. Mild to moderate right and moderate left neural foramen stenosis. L4-5: Moderate disc bulge. Moderate bilateral facet arthropathy with ligamentum flavum infolding. Mild spinal canal stenosis. Moderate right and mild to moderate left neural foraminal stenosis. L5-S1: Mild disc bulge eccentric to the right narrowing the right lateral recess. Moderate right and mild left facet arthropathy. No spinal canal stenosis. Moderate right and mild left neural foraminal stenosis.     Impression: 1.Multiple enhancing lesions within lumbar spine most prominent at L3, most compatible with osseous metastasis. There is an associated mild compression deformity at L3, likely a pathological fracture. 2.Mild to moderate multilevel degenerative changes of lumbar spine as described above. Electronically Signed: Ruddy Rodriguez  7/18/2023 3:32 PM EDT  Workstation ID: [id number]                 Plan for Follow-up of Pending Labs/Results:   Pending Labs       Order Current Status    Protime-INR In process    Blood Culture - Blood, Arm, Left Preliminary result    Blood Culture - Blood, Arm, Right Preliminary result          Discharge Details        Discharge Medications        New Medications        Instructions Start Date   LORazepam 0.5 MG tablet  Commonly known as: Ativan   0.5 mg, Oral, Every 4 Hours PRN      naloxone 4 MG/0.1ML nasal spray  Commonly known as: NARCAN   Call 911. Don't prime. [place] in 1 nostril for overdose. Repeat in 2-3 minutes in other nostril if no or minimal breathing/responsiveness.      oxyCODONE-acetaminophen 7.5-325 MG per tablet  Commonly known as: Percocet   Take 1 tablet by mouth Every 4 (Four) Hours As Needed for pain             Changes to Medications        Instructions Start Date   losartan 25 MG tablet  Commonly known as: COZAAR  What changed: how much to take   25 mg, Oral, Daily      metoprolol tartrate 25 MG  tablet  Commonly known as: LOPRESSOR  What changed:   how much to take  when to take this   25 mg, Oral, 2 Times Daily             Continue These Medications        Instructions Start Date   dilTIAZem  MG 24 hr capsule  Commonly known as: Cardizem CD   120 mg, Oral, Daily      nitroglycerin 0.4 MG SL tablet  Commonly known as: NITROSTAT   0.4 mg, Sublingual, Every 5 Minutes PRN, Take no more than 3 doses in 15 minutes.              Stop These Medications      digoxin 125 MCG tablet  Commonly known as: LANOXIN     finasteride 5 MG tablet  Commonly known as: PROSCAR     folic acid 1 MG tablet  Commonly known as: FOLVITE     HYDROcodone-acetaminophen  MG per tablet  Commonly known as: NORCO     montelukast 10 MG tablet  Commonly known as: SINGULAIR     pravastatin 40 MG tablet  Commonly known as: PRAVACHOL     tamsulosin 0.4 MG capsule 24 hr capsule  Commonly known as: FLOMAX     traMADol 50 MG tablet  Commonly known as: ULTRAM     warfarin 4 MG tablet  Commonly known as: COUMADIN              Allergies   Allergen Reactions    Bee Venom Anaphylaxis         Discharge Disposition:  Hospice/Home    Diet:  Hospital:  Diet Order   Procedures    Diet: Regular/House Diet; Texture: Regular Texture (IDDSI 7); Fluid Consistency: Thin (IDDSI 0)       Activity:  Activity Instructions       Activity as Tolerated              Restrictions or Other Recommendations:         CODE STATUS:    Code Status and Medical Interventions:   Ordered at: 07/20/23 1134     Level Of Support Discussed With:    Next of Kin (If No Surrogate)     Code Status (Patient has no pulse and is not breathing):    No CPR (Do Not Attempt to Resuscitate)     Medical Interventions (Patient has pulse or is breathing):    Comfort Measures       No future appointments.    Additional Instructions for the Follow-ups that You Need to Schedule       Discharge Follow-up with PCP   As directed       Currently Documented PCP:    Blayne Adan MD    PCP Phone  Number:    [phone]     Follow Up Details: PCP Dr. Adan as needed                       Maria E Silva DO  07/21/23      Time Spent on Discharge:  I spent  35  minutes on this discharge activity which included: face-to-face encounter with the patient, reviewing the data in the system, coordination of the care with the nursing staff as well as consultants, documentation, and entering orders.            Electronically signed by Maria E Silva DO at 07/21/23 0810

## 2023-07-21 NOTE — PLAN OF CARE
Goal Outcome Evaluation:              Outcome Evaluation: PRN pain medication x1 this shift. Discharge home with hospice today.

## 2023-07-21 NOTE — PROGRESS NOTES
Continued Stay Note  [facility]     Patient Name: Phuc Bowden  MRN: [id number]  Today's Date: 7/21/2023    Admit Date: 7/18/2023    Plan: Home with [facility]   Discharge Plan       Row Name 07/21/23 1127       Plan    Plan Home with [facility]    Final Discharge Disposition Code 50 - home with hospice    Final Note Visit made to pt, pt alert, fidgety, pt's daughter and spouse present. Plan remains for pt to discharge home today, daughter and spouse will transport pt via private vehicle. Staff nurse stated pt recently had dose of pain medication, will administer the ativan prior to discharge. The ativan and oxycodone delivered to pt's room by pharmacy, medications given to pt's daughter to be taken home. Instructed daughter to call hospice 24 hr number to initiate hospice services when pt arrives home. Please call [phone] if can be of further assistance.                   Discharge Codes    No documentation.                 Expected Discharge Date and Time       Expected Discharge Date Expected Discharge Time    Jul 21, 2023               Socorro Ruiz RN

## 2023-07-21 NOTE — DISCHARGE SUMMARY
[facility] Medicine Services  DISCHARGE SUMMARY    Patient Name: Phuc Bowden  : 1940  MRN: [id number]    Date of Admission: 2023  1:26 PM  Date of Discharge:  23  Primary Care Physician: Blayne Adan MD    Consults       Date and Time Order Name Status Description    2023 12:34 AM Inpatient Radiation Oncology Consult Completed     2023 12:34 AM Inpatient Hematology & Oncology Consult      2023  5:55 PM Inpatient Palliative Care MD Consult Completed             Hospital Course     Presenting Problem: pain    Active Hospital Problems    Diagnosis  POA    **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]  Yes    Right lower lobe lung mass [R91.8]  Yes    Metastasis [C79.9]  Yes    Coronary artery disease with history of myocardial infarction without history of CABG [I25.10, I25.2]  Not Applicable    COPD (chronic obstructive pulmonary disease) [J44.9]  Yes    Essential hypertension [I10]  Yes    Hyperlipidemia [E78.5]  Yes    Functional assessment declined [Z53.20]  Not Applicable    PAF (paroxysmal atrial fibrillation) [I48.0]  Yes    Supratherapeutic INR [R79.1]  Yes      Resolved Hospital Problems    Diagnosis Date Resolved POA    PNA (pneumonia) [J18.9] 2023 Yes    Pneumonia of right lung due to infectious organism [J18.9] 2023 Yes    Lactic acidosis [E87.20] 2023 Yes    Leukocytosis [D72.829] 2023 Unknown    CHF (congestive heart failure) [I50.9] 2023 Unknown          Hospital Course:  Phuc Bowden is a 82 y.o. male with pmh significant for CAD s/p MI and CABG, PAF on warfarin, skin cancer, HTN, HLD, hearing loss, previous tobacco use long term with COPD, GERD, presents with worsening low back pain x 2 months.  Patient had a recent fall and evaluation at that time showed concerns for bony mets.  Imaging also with liver mets as well as a right lung mass.  Patient was to have a biopsy at  however this was not done due to warfarin.   He has had progressive pain and weakness prompting evaluation.     Intractable Back Pain with functional decline/ FTT  Right lung mass with liver/ spinal mets  -- recent discovery of lung mass in June upon OSH ED with pending work up through [facility], however pain and debility increasing  -Oncology consulted.  Reviewed treatment be palliative in nature.  Family has opted to not undergo biopsy of right lung mass.  Patient received 1 dose of palliative radiation on 7/19 to L2-3 and left hip.  Palliative care was consulted and medications adjusted for comfort.  Hospice was consulted and patient was discharged home with hospice.     Right sided pneumonia, post obstructive  Leukocytosis, lactic acidosis  -- zosyn and vanc started in ED. then continued on Rocephin and Doxy.  -Blood cultures no growth to date.  Strep and Legionella negative.  MRSA negative.  Respiratory panel negative.  -We will discontinue antibiotics with comfort focused plan.     PAF  Supratherapeutic INR  -Continued metoprolol and diltiazem.  Discontinue warfarin on discharge     HTN  HLD  CAD s/p CABG  CHF  -- continued  losartan, metoprolol, pravastatin     Discharge Follow Up Recommendations for outpatient labs/diagnostics:  Home hospice to resume care  PCP Dr. Adan as needed    Day of Discharge     HPI:   Difficulty overnight with restlessness.  Currently more calm.  Daughter and wife at bedside.  Concerned about how they are going to get patient home for 2-hour drive with his restlessness.  Reviewed plans for home hospice and they are all agreeable to this.    Review of Systems  Unable to obtain due to mental status    Vital Signs:   Temp:  [94.7 °F (34.8 °C)-96.4 °F (35.8 °C)] 96.1 °F (35.6 °C)  Heart Rate:  [] 98  Resp:  [16-19] 16  BP: ()/(63-87) 113/87      Physical Exam:  Constitutional: restless  HENT: NCAT, mucous membranes moist  Respiratory: increased effort; on room air  Cardiovascular: tachy; irregular  Gastrointestinal:  Positive bowel sounds, soft, nontender, nondistended  Musculoskeletal: No bilateral ankle edema  Psychiatric: somewhat restless.   Neurologic: strength symmetric in all extremities, Cranial Nerves grossly intact to confrontation, speech clear  Skin: No rashes      Pertinent  and/or Most Recent Results     LAB RESULTS:      Lab 07/20/23  0747 07/19/23  0656 07/18/23  1534 07/18/23  1331 07/18/23  1251   WBC 12.76* 12.67*  --   --  16.55*   HEMOGLOBIN 11.6* 11.7*  --   --  13.7   HEMATOCRIT 34.9* 34.6*  --   --  42.1   PLATELETS 211 206  --   --  284   NEUTROS ABS  --  10.63*  --   --  13.99*   IMMATURE GRANS (ABS)  --  0.16*  --   --  0.42*   LYMPHS ABS  --  0.78  --   --  0.87   MONOS ABS  --  1.07*  --   --  1.21*   EOS ABS  --  0.00  --   --  0.01   MCV 86.2 87.8  --   --  90.0   PROCALCITONIN  --   --   --  0.27*  --    LACTATE  --   --  1.4  --  2.6*   PROTIME 20.4* 35.3*  --   --  53.3*         Lab 07/20/23  2235 07/20/23  0747 07/19/23  1804 07/19/23  0656 07/18/23  1331 07/18/23  1253   SODIUM  --  136  --  137 136  --    POTASSIUM 4.0 3.0* 3.7 3.6 4.1  --    CHLORIDE  --  102  --  102 97*  --    CO2  --  20.0*  --  18.0* 22.0  --    ANION GAP  --  14.0  --  17.0* 17.0*  --    BUN  --  15  --  15 17  --    CREATININE  --  0.47*  --  0.56* 0.98 0.90   EGFR  --  104.4  --  99.0 77.5  --    GLUCOSE  --  99  --  100* 115*  --    CALCIUM  --  9.3  --  8.9 10.3  --    MAGNESIUM  --   --   --  1.8  --   --          Lab 07/19/23  0656 07/18/23  1331   TOTAL PROTEIN 5.7* 7.7   ALBUMIN 2.9* 3.6   GLOBULIN 2.8 4.1   ALT (SGPT) 12 16   AST (SGOT) 24 28   BILIRUBIN 1.6* 2.0*   ALK PHOS 230* 270*         Lab 07/20/23  0747 07/19/23  0656 07/18/23  1331 07/18/23  1251   PROBNP  --   --  11,337.0*  --    HSTROP T  --   --  21*  --    PROTIME 20.4* 35.3*  --  53.3*   INR 1.73* 3.49*  --  5.91*                 Brief Urine Lab Results  (Last result in the past 365 days)        Color   Clarity   Blood   Leuk Est   Nitrite    Protein   CREAT   Urine HCG        07/18/23 1548 Yellow   Clear   Trace   Negative   Negative   Trace                 Microbiology Results (last 10 days)       Procedure Component Value - Date/Time    MRSA Screen, PCR (Inpatient) - Swab, Nares [[id number]]  (Normal) Collected: 07/19/23 0542    Lab Status: Final result Specimen: Swab from Nares Updated: 07/19/23 0840     MRSA PCR Negative    Narrative:      The negative predictive value of this diagnostic test is high and should only be used to consider de-escalating anti-MRSA therapy. A positive result may indicate colonization with MRSA and must be correlated clinically.  MRSA Negative    Respiratory Panel PCR w/COVID-19(SARS-CoV-2) CASSIDY/TAMELA/ARTURO/PAD/COR/MAD/GRETCHEN In-House, NP Swab in UTM/VTM, 3-4 HR TAT - Swab, Nasopharynx [[id number]]  (Normal) Collected: 07/18/23 1619    Lab Status: Final result Specimen: Swab from Nasopharynx Updated: 07/18/23 1716     ADENOVIRUS, PCR Not Detected     Coronavirus 229E Not Detected     Coronavirus HKU1 Not Detected     Coronavirus NL63 Not Detected     Coronavirus OC43 Not Detected     COVID19 Not Detected     Human Metapneumovirus Not Detected     Human Rhinovirus/Enterovirus Not Detected     Influenza A PCR Not Detected     Influenza B PCR Not Detected     Parainfluenza Virus 1 Not Detected     Parainfluenza Virus 2 Not Detected     Parainfluenza Virus 3 Not Detected     Parainfluenza Virus 4 Not Detected     RSV, PCR Not Detected     Bordetella pertussis pcr Not Detected     Bordetella parapertussis PCR Not Detected     Chlamydophila pneumoniae PCR Not Detected     Mycoplasma pneumo by PCR Not Detected    Narrative:      In the setting of a positive respiratory panel with a viral infection PLUS a negative procalcitonin without other underlying concern for bacterial infection, consider observing off antibiotics or discontinuation of antibiotics and continue supportive care. If the respiratory panel is positive for atypical bacterial  "infection (Bordetella pertussis, Chlamydophila pneumoniae, or Mycoplasma pneumoniae), consider antibiotic de-escalation to target atypical bacterial infection.    S. Pneumo Ag Urine or CSF - Urine, Urine, Clean Catch [[id number]]  (Normal) Collected: 07/18/23 1548    Lab Status: Final result Specimen: Urine, Clean Catch Updated: 07/19/23 0117     Strep Pneumo Ag Negative    Legionella Antigen, Urine - Urine, Urine, Clean Catch [[id number]]  (Normal) Collected: 07/18/23 1548    Lab Status: Final result Specimen: Urine, Clean Catch Updated: 07/19/23 0117     LEGIONELLA ANTIGEN, URINE Negative    Blood Culture - Blood, Arm, Right [[id number]]  (Normal) Collected: 07/18/23 1535    Lab Status: Preliminary result Specimen: Blood from Arm, Right Updated: 07/20/23 1600     Blood Culture No growth at 2 days    Blood Culture - Blood, Arm, Left [[id number]]  (Normal) Collected: 07/18/23 1530    Lab Status: Preliminary result Specimen: Blood from Arm, Left Updated: 07/20/23 1600     Blood Culture No growth at 2 days            CT Abdomen Pelvis With Contrast    Result Date: 7/18/2023  CT ABDOMEN PELVIS W CONTRAST Date of Exam: 7/18/2023 1:08 PM EDT Indication: Low back pain with reported lesion to the bar spine at outside 20.  Weight loss and poor appetite.  Admitted 1 month ago side hospital for \"abdominal infection \". Comparison: June 7, 2023 Technique: Axial CT images were obtained of the abdomen and pelvis following the uneventful intravenous administration of 85 mL Isovue-370. Reconstructed coronal and sagittal images were also obtained. Automated exposure control and iterative construction methods were used. Findings: Hypodense lesions within the liver appear increased in size and number. An index lesion within the left hepatic lobe on image 33 measures 3.6 x 3.1 cm, previously 1.1 x 1.0 cm. The gallbladder, adrenal glands, right kidney, spleen, and pancreas are unremarkable. There are small left renal cyst. The stomach " appears normal. The small bowel appears normal in caliber and configuration. The colon appears normal. The appendix appears normal. There is no ascites or loculated collection. No abnormally enlarged lymph nodes are identified. The rectum and urinary bladder are unremarkable. The prostate is enlarged measuring 4.8 cm in transverse dimension. Several osseous metastases have increased in size, for example an index lesion involving the right L3 vertebral body measuring 4.9 cm, previously 3.2 cm. Another metastasis is seen within the left ischium.     Impression: 1.Progressive hypodense lesions within liver and progressive osseous metastasis, compatible with disease progression. 2.No acute process identified within abdomen/pelvis. 3.Enlarged prostate. Electronically Signed: Ruddy Rodriguez  7/18/2023 2:17 PM EDT  Workstation ID: [id number]    CT Angiogram Chest    Result Date: 7/18/2023  CT ANGIOGRAM CHEST Date of Exam: 7/18/2023 1:08 PM EDT Indication: Per family, oxygen level 80% this morning.  Weight loss over the past 1 month.  Possible malignancy.. Comparison: None available. Technique: Axial pulmonary arterial phase IV contrast enhanced CT angiogram of the chest per PE protocol. Two-dimensional reconstructions were postprocessed. Axial IV contrast-enhanced CT of the abdomen and pelvis with multiplanar reconstruction. Findings: CTA chest: There is no pathologic axillary adenopathy or other worrisome body wall soft tissue finding in the chest. Trace pleural effusion is present on the right. There is no pericardial effusion. Atherosclerotic, nonaneurysmal thoracic aorta. The pulmonary arteries are patent and well opacified, without evidence of filling defect concerning for acute pulmonary embolus. There is no distinct pathologic mediastinal adenopathy. Evaluation of the osseous structures demonstrates no evidence of acute fracture or aggressive osseous lesion. Evaluation of the lung fields demonstrates fairly extensive  confluent consolidation in the right lower lobe, with some evidence of endobronchial obstruction, with underlying mass not entirely excluded. The remaining  lung fields demonstrate moderate emphysema and mild diffuse peribronchial groundglass opacity, suggesting component of bronchiolitis.     Impression: Prominent consolidation is noted in the right lower lobe, somewhat masslike with evidence of endobronchial obstruction, concerning for underlying possible primary or metastatic neoplasm. There is otherwise no suspicious thoracic adenopathy. Diffuse areas of centrilobular groundglass opacity are present, suggesting component of bronchiolitis. No additional acute findings are present. There is no acute pulmonary embolus. Electronically Signed: Eder Mireles  7/18/2023 2:06 PM EDT  Workstation ID: [id number]    MRI Lumbar Spine With & Without Contrast    Result Date: 7/18/2023  MRI LUMBAR SPINE W WO CONTRAST Date of Exam: 7/18/2023 2:27 PM EDT Indication: Low back pain.  Bilateral lower extremity weakness and decreased sensation.  Reported lesion in the lumbar spine from outside hospital..  Comparison: CT abdomen pelvis from June 7, 2023 Technique:  Routine multiplanar/multisequence sequence images of the lumbar spine were obtained before and after the uneventful administration of 14 mL Multihance.  Findings: The alignment is anatomic. The vertebral body heights appear normal. There are multiple T1 hypointense enhancing lesions most prominent within the L3 vertebral body most compatible with metastasis. There is an associated mild compression deformity at L3,  likely a pathological fracture. No epidural spread of tumor is identified. There are degenerative endplate changes at L3-4 and L4-5. There is disc desiccation at all levels, with moderate degenerative loss of disc height at L4-5. The conus terminates at  the T12-L1 level. The posterior paravertebral soft tissues are unremarkable. L1-2: Mild disc bulge. Mild  bilateral facet arthropathy. No spinal canal stenosis. No neural foraminal stenosis. L2-3: Mild disc bulge eccentric to the left. Mild right and moderate facet arthropathy with ligamentum flavum infolding. Mild spinal canal stenosis. Mild right and mild to moderate left neural foraminal stenosis. L3-4: Mild disc bulge. Moderate bilateral facet arthropathy with ligamentum flavum infolding. Mild to moderate spinal canal stenosis. Mild to moderate right and moderate left neural foramen stenosis. L4-5: Moderate disc bulge. Moderate bilateral facet arthropathy with ligamentum flavum infolding. Mild spinal canal stenosis. Moderate right and mild to moderate left neural foraminal stenosis. L5-S1: Mild disc bulge eccentric to the right narrowing the right lateral recess. Moderate right and mild left facet arthropathy. No spinal canal stenosis. Moderate right and mild left neural foraminal stenosis.     Impression: 1.Multiple enhancing lesions within lumbar spine most prominent at L3, most compatible with osseous metastasis. There is an associated mild compression deformity at L3, likely a pathological fracture. 2.Mild to moderate multilevel degenerative changes of lumbar spine as described above. Electronically Signed: Ruddy Rodriguez  7/18/2023 3:32 PM EDT  Workstation ID: [id number]                 Plan for Follow-up of Pending Labs/Results:   Pending Labs       Order Current Status    Protime-INR In process    Blood Culture - Blood, Arm, Left Preliminary result    Blood Culture - Blood, Arm, Right Preliminary result          Discharge Details        Discharge Medications        New Medications        Instructions Start Date   LORazepam 0.5 MG tablet  Commonly known as: Ativan   0.5 mg, Oral, Every 4 Hours PRN      naloxone 4 MG/0.1ML nasal spray  Commonly known as: NARCAN   Call 911. Don't prime. [place] in 1 nostril for overdose. Repeat in 2-3 minutes in other nostril if no or minimal breathing/responsiveness.       oxyCODONE-acetaminophen 7.5-325 MG per tablet  Commonly known as: Percocet   Take 1 tablet by mouth Every 4 (Four) Hours As Needed for pain             Changes to Medications        Instructions Start Date   losartan 25 MG tablet  Commonly known as: COZAAR  What changed: how much to take   25 mg, Oral, Daily      metoprolol tartrate 25 MG tablet  Commonly known as: LOPRESSOR  What changed:   how much to take  when to take this   25 mg, Oral, 2 Times Daily             Continue These Medications        Instructions Start Date   dilTIAZem  MG 24 hr capsule  Commonly known as: Cardizem CD   120 mg, Oral, Daily      nitroglycerin 0.4 MG SL tablet  Commonly known as: NITROSTAT   0.4 mg, Sublingual, Every 5 Minutes PRN, Take no more than 3 doses in 15 minutes.              Stop These Medications      digoxin 125 MCG tablet  Commonly known as: LANOXIN     finasteride 5 MG tablet  Commonly known as: PROSCAR     folic acid 1 MG tablet  Commonly known as: FOLVITE     HYDROcodone-acetaminophen  MG per tablet  Commonly known as: NORCO     montelukast 10 MG tablet  Commonly known as: SINGULAIR     pravastatin 40 MG tablet  Commonly known as: PRAVACHOL     tamsulosin 0.4 MG capsule 24 hr capsule  Commonly known as: FLOMAX     traMADol 50 MG tablet  Commonly known as: ULTRAM     warfarin 4 MG tablet  Commonly known as: COUMADIN              Allergies   Allergen Reactions    Bee Venom Anaphylaxis         Discharge Disposition:  Hospice/Home    Diet:  Hospital:  Diet Order   Procedures    Diet: Regular/House Diet; Texture: Regular Texture (IDDSI 7); Fluid Consistency: Thin (IDDSI 0)       Activity:  Activity Instructions       Activity as Tolerated              Restrictions or Other Recommendations:         CODE STATUS:    Code Status and Medical Interventions:   Ordered at: 07/20/23 1134     Level Of Support Discussed With:    Next of Kin (If No Surrogate)     Code Status (Patient has no pulse and is not breathing):     No CPR (Do Not Attempt to Resuscitate)     Medical Interventions (Patient has pulse or is breathing):    Comfort Measures       No future appointments.    Additional Instructions for the Follow-ups that You Need to Schedule       Discharge Follow-up with PCP   As directed       Currently Documented PCP:    Blayne Adan MD    PCP Phone Number:    [phone]     Follow Up Details: PCP Dr. Adan as needed                       Maria E Silva DO  07/21/23      Time Spent on Discharge:  I spent  35  minutes on this discharge activity which included: face-to-face encounter with the patient, reviewing the data in the system, coordination of the care with the nursing staff as well as consultants, documentation, and entering orders.

## 2023-07-21 NOTE — PAYOR COMM NOTE
"[id number]  Nayeli Acuna (82 y.o. Male)       Date of Birth   1940    Social Security Number       Address   [address]  [facility] [address]    Home Phone   [phone]    MRN   [id number]       Christianity   None    Marital Status                               Admission Date   23    Admission Type   Emergency    Admitting Provider   Maria E Silva DO    Attending Provider   Maria E Silva DO    Department, Room/Bed   [facility] 6B, N645/1       Discharge Date       Discharge Disposition       Discharge Destination                                 Attending Provider: Maria E Silva DO    Allergies: Bee Venom    Isolation: None   Infection: None   Code Status: No CPR    Ht: 177.8 cm (70\")   Wt: 69.9 kg (154 lb)    Admission Cmt: None   Principal Problem: 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]                   Active Insurance as of 2023       Primary Coverage       Payor Plan Insurance Group Employer/Plan Group    ANTHEM MEDICARE REPLACEMENT ANTHEM MEDICARE ADVANTAGE [id number]       Payor Plan Address Payor Plan Phone Number Payor Plan Fax Number Effective Dates    [address] [phone]  2022 - None Entered    [facility] [address]         Subscriber Name Subscriber Birth Date Member ID       NAYELI ACUNA 1940 [id number]                     Emergency Contacts        (Rel.) Home Phone Work Phone Mobile Phone    Michelle Acuna (Spouse) [phone] -- --    Shellie Fowler (Daughter) [phone] -- --                 History & Physical        Vy Alston MD at 23 [address] [place] [address] Medicine Services  HISTORY AND PHYSICAL    Patient Name: Nayeli Acuna  : 1940  MRN: [id number]  Primary Care Physician: Blayne Adan MD  Date of admission: 2023    Subjective  Subjective     Chief Complaint:  Intractable back pain    HPI:  Nayeli Acuna is a 82 y.o. male with pmh significant for CAD s/p MI and CABG, " PAF on warfarin, skin cancer, HTN, HLD, hearing loss, previous tobacco use long term with COPD, GERD, presents with worsening low back pain x 2 months. Patient seen OSH couple of weeks ago for pain, falls and weakness. He was treated for nonspecific abdominal infection and during work up found right lung mass with probable mets to liver/ spine. He was to have follow up for biopsy with  group home which was not performed due to warfarin use. He cancelled follow up due to severe back pain and significant decline in mobility. Patient reports within a weeks time has gone from cane to walker and now unable to stand without heavy assist. He has also lost 15 lbs in past month due to loss of appetite.   CT chest and abd do confirm RLL consolidation and probable mass, hypodense lesions in the liver and osseous mets.        Review of Systems   Constitutional:  Positive for activity change, appetite change and unexpected weight change. Negative for fever.   HENT: Negative.     Respiratory: Negative.     Cardiovascular: Negative.    Gastrointestinal:  Positive for abdominal distention.   Genitourinary: Negative.    Musculoskeletal:  Positive for back pain and gait problem.   Skin: Negative.    Neurological:  Positive for weakness.   Hematological:  Bruises/bleeds easily.   Psychiatric/Behavioral: Negative.            Personal History     Past Medical History:   Diagnosis Date    Anemia     Arthritis     Atrial fibrillation     Bowel obstruction     Bradycardia     CHF (congestive heart failure)     COPD (chronic obstructive pulmonary disease)     Coronary artery disease     GERD (gastroesophageal reflux disease)     Hearing loss     Hyperlipidemia     Injury of back     Myocardial infarction     Sciatica          Oncology Problem List:  Metastasis (07/18/2023; Status: Active)       Past Surgical History:   Procedure Laterality Date    CARDIAC SURGERY      2006    CORONARY ARTERY BYPASS GRAFT      EYE SURGERY      HEMORRHOIDECTOMY          Family History:  family history includes Lung cancer in his brother and sister.     Social History:    Social History     Social History Narrative    Not on file       Medications:  dilTIAZem CD, fish oil, folic acid, losartan, metoprolol tartrate, nitroglycerin, pravastatin, raNITIdine, traMADol, and warfarin    Allergies   Allergen Reactions    Bee Venom Anaphylaxis       Objective  Objective     Vital Signs:   Temp:  [97.5 °F (36.4 °C)] 97.5 °F (36.4 °C)  Heart Rate:  [72-89] 72  Resp:  [16] 16  BP: (121-147)/(69-98) 121/69    Physical Exam   Constitutional: Awake, alert, frail, very Native  Eyes: PERRLA, sclerae anicteric, no conjunctival injection  HENT: NCAT, mucous membranes moist  Neck: Supple, no thyromegaly, trachea midline  Respiratory: diminished throughout, nonlabored respirations   Cardiovascular: RRR, no murmurs  Gastrointestinal: Positive bowel sounds, soft, nontender, nondistended  Musculoskeletal: No bilateral ankle edema, no clubbing or cyanosis to extremities  Psychiatric: Appropriate affect, cooperative  Neurologic: Oriented x 3, general weakness, symmetric,  speech clear  Skin: No rashes      Result Review:  I have personally reviewed the results from the time of this admission to 7/18/2023 16:23 EDT and agree with these findings:  [x]  Laboratory list / accordion  []  Microbiology  [x]  Radiology  [x]  EKG/Telemetry   []  Cardiology/Vascular   []  Pathology  []  Old records  []  Other:  Most notable findings include:     LAB RESULTS:      Lab 07/18/23  1534 07/18/23  1331 07/18/23  1251   WBC  --   --  16.55*   HEMOGLOBIN  --   --  13.7   HEMATOCRIT  --   --  42.1   PLATELETS  --   --  284   NEUTROS ABS  --   --  13.99*   IMMATURE GRANS (ABS)  --   --  0.42*   LYMPHS ABS  --   --  0.87   MONOS ABS  --   --  1.21*   EOS ABS  --   --  0.01   MCV  --   --  90.0   PROCALCITONIN  --  0.27*  --    LACTATE 1.4  --  2.6*   PROTIME  --   --  53.3*         Lab 07/18/23  1331 07/18/23  1253   SODIUM  "136  --    POTASSIUM 4.1  --    CHLORIDE 97*  --    CO2 22.0  --    ANION GAP 17.0*  --    BUN 17  --    CREATININE 0.98 0.90   EGFR 77.5  --    GLUCOSE 115*  --    CALCIUM 10.3  --          Lab 07/18/23  1331   TOTAL PROTEIN 7.7   ALBUMIN 3.6   GLOBULIN 4.1   ALT (SGPT) 16   AST (SGOT) 28   BILIRUBIN 2.0*   ALK PHOS 270*         Lab 07/18/23  1331 07/18/23  1251   PROBNP 11,337.0*  --    HSTROP T 21*  --    PROTIME  --  53.3*   INR  --  5.91*                 Brief Urine Lab Results  (Last result in the past 365 days)        Color   Clarity   Blood   Leuk Est   Nitrite   Protein   CREAT   Urine HCG        07/18/23 1548 Yellow   Clear   Trace   Negative   Negative   Trace                 Microbiology Results (last 10 days)       ** No results found for the last 240 hours. **            CT Abdomen Pelvis With Contrast    Result Date: 7/18/2023  CT ABDOMEN PELVIS W CONTRAST Date of Exam: 7/18/2023 1:08 PM EDT Indication: Low back pain with reported lesion to the bar spine at outside 20.  Weight loss and poor appetite.  Admitted 1 month ago side hospital for \"abdominal infection \". Comparison: June 7, 2023 Technique: Axial CT images were obtained of the abdomen and pelvis following the uneventful intravenous administration of 85 mL Isovue-370. Reconstructed coronal and sagittal images were also obtained. Automated exposure control and iterative construction methods were used. Findings: Hypodense lesions within the liver appear increased in size and number. An index lesion within the left hepatic lobe on image 33 measures 3.6 x 3.1 cm, previously 1.1 x 1.0 cm. The gallbladder, adrenal glands, right kidney, spleen, and pancreas are unremarkable. There are small left renal cyst. The stomach appears normal. The small bowel appears normal in caliber and configuration. The colon appears normal. The appendix appears normal. There is no ascites or loculated collection. No abnormally enlarged lymph nodes are identified. The " rectum and urinary bladder are unremarkable. The prostate is enlarged measuring 4.8 cm in transverse dimension. Several osseous metastases have increased in size, for example an index lesion involving the right L3 vertebral body measuring 4.9 cm, previously 3.2 cm. Another metastasis is seen within the left ischium.     Impression: Impression: 1.Progressive hypodense lesions within liver and progressive osseous metastasis, compatible with disease progression. 2.No acute process identified within abdomen/pelvis. 3.Enlarged prostate. Electronically Signed: Ruddy Rodriguez  7/18/2023 2:17 PM EDT  Workstation ID: [id number]    CT Angiogram Chest    Result Date: 7/18/2023  CT ANGIOGRAM CHEST Date of Exam: 7/18/2023 1:08 PM EDT Indication: Per family, oxygen level 80% this morning.  Weight loss over the past 1 month.  Possible malignancy.. Comparison: None available. Technique: Axial pulmonary arterial phase IV contrast enhanced CT angiogram of the chest per PE protocol. Two-dimensional reconstructions were postprocessed. Axial IV contrast-enhanced CT of the abdomen and pelvis with multiplanar reconstruction. Findings: CTA chest: There is no pathologic axillary adenopathy or other worrisome body wall soft tissue finding in the chest. Trace pleural effusion is present on the right. There is no pericardial effusion. Atherosclerotic, nonaneurysmal thoracic aorta. The pulmonary arteries are patent and well opacified, without evidence of filling defect concerning for acute pulmonary embolus. There is no distinct pathologic mediastinal adenopathy. Evaluation of the osseous structures demonstrates no evidence of acute fracture or aggressive osseous lesion. Evaluation of the lung fields demonstrates fairly extensive confluent consolidation in the right lower lobe, with some evidence of endobronchial obstruction, with underlying mass not entirely excluded. The remaining  lung fields demonstrate moderate emphysema and mild diffuse  peribronchial groundglass opacity, suggesting component of bronchiolitis.     Impression: Impression: Prominent consolidation is noted in the right lower lobe, somewhat masslike with evidence of endobronchial obstruction, concerning for underlying possible primary or metastatic neoplasm. There is otherwise no suspicious thoracic adenopathy. Diffuse areas of centrilobular groundglass opacity are present, suggesting component of bronchiolitis. No additional acute findings are present. There is no acute pulmonary embolus. Electronically Signed: Eder Mireles  7/18/2023 2:06 PM EDT  Workstation ID: [id number]    MRI Lumbar Spine With & Without Contrast    Result Date: 7/18/2023  MRI LUMBAR SPINE W WO CONTRAST Date of Exam: 7/18/2023 2:27 PM EDT Indication: Low back pain.  Bilateral lower extremity weakness and decreased sensation.  Reported lesion in the lumbar spine from outside hospital..  Comparison: CT abdomen pelvis from June 7, 2023 Technique:  Routine multiplanar/multisequence sequence images of the lumbar spine were obtained before and after the uneventful administration of 14 mL Multihance.  Findings: The alignment is anatomic. The vertebral body heights appear normal. There are multiple T1 hypointense enhancing lesions most prominent within the L3 vertebral body most compatible with metastasis. There is an associated mild compression deformity at L3,  likely a pathological fracture. No epidural spread of tumor is identified. There are degenerative endplate changes at L3-4 and L4-5. There is disc desiccation at all levels, with moderate degenerative loss of disc height at L4-5. The conus terminates at  the T12-L1 level. The posterior paravertebral soft tissues are unremarkable. L1-2: Mild disc bulge. Mild bilateral facet arthropathy. No spinal canal stenosis. No neural foraminal stenosis. L2-3: Mild disc bulge eccentric to the left. Mild right and moderate facet arthropathy with ligamentum flavum infolding. Mild  spinal canal stenosis. Mild right and mild to moderate left neural foraminal stenosis. L3-4: Mild disc bulge. Moderate bilateral facet arthropathy with ligamentum flavum infolding. Mild to moderate spinal canal stenosis. Mild to moderate right and moderate left neural foramen stenosis. L4-5: Moderate disc bulge. Moderate bilateral facet arthropathy with ligamentum flavum infolding. Mild spinal canal stenosis. Moderate right and mild to moderate left neural foraminal stenosis. L5-S1: Mild disc bulge eccentric to the right narrowing the right lateral recess. Moderate right and mild left facet arthropathy. No spinal canal stenosis. Moderate right and mild left neural foraminal stenosis.     Impression: Impression: 1.Multiple enhancing lesions within lumbar spine most prominent at L3, most compatible with osseous metastasis. There is an associated mild compression deformity at L3, likely a pathological fracture. 2.Mild to moderate multilevel degenerative changes of lumbar spine as described above. Electronically Signed: Ruddy Rodriguez  7/18/2023 3:32 PM EDT  Workstation ID: [id number]         Assessment & Plan  Assessment & Plan       Cancer associated pain    Right lower lobe lung mass    Metastasis    Coronary artery disease with history of myocardial infarction without history of CABG    COPD (chronic obstructive pulmonary disease)    Essential hypertension    Hyperlipidemia    Functional assessment declined    PAF (paroxysmal atrial fibrillation)    Pneumonia of right lung due to infectious organism    Supratherapeutic INR    Lactic acidosis    Leukocytosis    CHF (congestive heart failure)      Intractable Back Pain with functional decline/ FTT  Right lung mass with liver/ spinal mets  -- recent discovery of lung mass in June upon OSH ED with pending work up through [facility], however pain and debility increasing  -- Radiation oncology consult for AM   -- Hem/Onc consulted  -- NPO after MN for possible biopsy with IR  (pending INR in AM)  -- start oxycodone and morphine prn pain (failed tramadol and norco 10/325 outpatient). Palliative consult in am for ongoing assistance upon dc  -- PT/OT/ nutrition assessment    Right sided pneumonia, post obstructive  Leukocytosis, lactic acidosis  -- zosyn and vanc started in ED. Continue zosyn. Check MRSA PCR  -- respiratory panel pending  -- blood cx pending  -- S pneumo and Legionella Antigen pending  -- repeat lactic pending    PAF  Supratherapeutic INR  -- hold warfarin, pharmacy consulted  -- vitamin K for INR 5.9; no evidence of bleeding; will need biopsy while inpatient  -- continue metoprolol and cardizem    HTN  HLD  CAD s/p CABG  CHF  -- continue losartan, metoprolol, pravastatin    DVT prophylaxis:  warfarin held    CODE STATUS:    Medical Intervention Limits: NO intubation (DNI); NO cardioversion  Code Status (Patient has no pulse and is not breathing): No CPR (Do Not Attempt to Resuscitate)  Medical Interventions (Patient has pulse or is breathing): Limited Support      Expected Discharge  Expected Discharge Date: 7/25/2023; Expected Discharge Time:       This note has been completed as part of a split-shared workflow.     Electronically signed by PILO Renae, 07/18/23, 4:12 PM EDT.          Attending   Admission Attestation       I have performed an independent face-to-face diagnostic evaluation including performing an independent physical examination as documented here.  The documented plan of care above was reviewed and developed with the advanced practice clinician (APC).      Brief Summary Statement:   Phuc Bowden is a 82 y.o. male with prior history of tobacco use disorder, CAD status post CABG (2006), A-fib on warfarin, CHF (data deficit), HTN, who presented for evaluation of pain in his back.  Of note, patient had a visit to the ER after falling from a ladder and imaging showed bilateral pleural effusions, right lower lobe consolidation versus mass, liver  lesions, spinal lesions.  He was seen at [facility] on 6/15 (Dr. Farrar) for this and interventional pulmonary was planning for bronchoscopy, possible biopsy and thoracentesis....INR 5.9 on admisison w/o evidence of bleeding.  Patient endorsed generalized back pain, bilateral leg pain, cough with streaks of blood in his sputum, weight loss, generalized weakness, malaise.  His daughter was present at bedside.    Remainder of detailed HPI is as noted by APC and has been reviewed and/or edited by me for completeness.    Attending Physical Exam:  Temp:  [97.5 °F (36.4 °C)] 97.5 °F (36.4 °C)  Heart Rate:  [] 105  Resp:  [16] 16  BP: (121-147)/(69-98) 121/81    Constitutional: Awake, alert  Eyes: PERRLA, sclerae anicteric, no conjunctival injection  HENT: NCAT, mucous membranes moist  Neck: Supple, no thyromegaly, no lymphadenopathy, trachea midline  Respiratory: Right lower lobe with crackles, diminished, nonlabored respirations   Cardiovascular: RRR, no murmurs, rubs, or gallops, palpable pedal pulses bilaterally  Gastrointestinal: Positive bowel sounds, soft, nontender, nondistended  Musculoskeletal: No bilateral ankle edema, no clubbing or cyanosis to extremities  Psychiatric: Appropriate affect, cooperative  Neurologic: Oriented x 3, strength symmetric in all extremities, Cranial Nerves grossly intact to confrontation, speech clear, very hard of hearing  Skin: No rashes      Brief Assessment/Plan :  See detailed assessment and plan developed with APC which I have reviewed and/or edited for completeness.            Vy Alston MD  23                      Electronically signed by Vy Alston MD at 23 [id number]          Physician Progress Notes (all)        Maria E Silva DO at 23 1546              [facility] Medicine Services  PROGRESS NOTE    Patient Name: Phuc Bowden  : 1940  MRN: [id number]    Date of Admission: 2023  Primary Care Physician:  Blayne Adan MD    Subjective   Subjective     CC:  Leg pain    HPI:  No acute events.  Patient resting and did not wake.  Family at bedside.  Plan on going home with hospice tomorrow    ROS:  Unable to obtain  Objective   Objective     Vital Signs:   Temp:  [94.7 °F (34.8 °C)-98 °F (36.7 °C)] 94.7 °F (34.8 °C)  Heart Rate:  [112-145] 131  Resp:  [16-19] 18  BP: ()/(63-86) 106/86     Physical Exam:  Constitutional: frail; up in the chair sleeping  HENT: NCAT, mucous membranes moist  Respiratory: normal effort and rate   Cardiovascular: tachy on tele   Musculoskeletal: No bilateral ankle edema  Psychiatric: calm  Neurologic: strength symmetric in all extremities, Cranial Nerves grossly intact to confrontation  Skin: No rashes    Results Reviewed:  LAB RESULTS:      Lab 07/20/23  0747 07/19/23  0656 07/18/23  1534 07/18/23  1331 07/18/23  1251   WBC 12.76* 12.67*  --   --  16.55*   HEMOGLOBIN 11.6* 11.7*  --   --  13.7   HEMATOCRIT 34.9* 34.6*  --   --  42.1   PLATELETS 211 206  --   --  284   NEUTROS ABS  --  10.63*  --   --  13.99*   IMMATURE GRANS (ABS)  --  0.16*  --   --  0.42*   LYMPHS ABS  --  0.78  --   --  0.87   MONOS ABS  --  1.07*  --   --  1.21*   EOS ABS  --  0.00  --   --  0.01   MCV 86.2 87.8  --   --  90.0   PROCALCITONIN  --   --   --  0.27*  --    LACTATE  --   --  1.4  --  2.6*   PROTIME 20.4* 35.3*  --   --  53.3*         Lab 07/20/23  0747 07/19/23  1804 07/19/23  0656 07/18/23  1331 07/18/23  1253   SODIUM 136  --  137 136  --    POTASSIUM 3.0* 3.7 3.6 4.1  --    CHLORIDE 102  --  102 97*  --    CO2 20.0*  --  18.0* 22.0  --    ANION GAP 14.0  --  17.0* 17.0*  --    BUN 15  --  15 17  --    CREATININE 0.47*  --  0.56* 0.98 0.90   EGFR 104.4  --  99.0 77.5  --    GLUCOSE 99  --  100* 115*  --    CALCIUM 9.3  --  8.9 10.3  --    MAGNESIUM  --   --  1.8  --   --          Lab 07/19/23  0656 07/18/23  1331   TOTAL PROTEIN 5.7* 7.7   ALBUMIN 2.9* 3.6   GLOBULIN 2.8 4.1   ALT (SGPT) 12 16    AST (SGOT) 24 28   BILIRUBIN 1.6* 2.0*   ALK PHOS 230* 270*         Lab 07/20/23  0747 07/19/23  0656 07/18/23  1331 07/18/23  1251   PROBNP  --   --  11,337.0*  --    HSTROP T  --   --  21*  --    PROTIME 20.4* 35.3*  --  53.3*   INR 1.73* 3.49*  --  5.91*                 Brief Urine Lab Results  (Last result in the past 365 days)        Color   Clarity   Blood   Leuk Est   Nitrite   Protein   CREAT   Urine HCG        07/18/23 1548 Yellow   Clear   Trace   Negative   Negative   Trace                   Microbiology Results Abnormal       Procedure Component Value - Date/Time    Blood Culture - Blood, Arm, Left [[id number]]  (Normal) Collected: 07/18/23 1530    Lab Status: Preliminary result Specimen: Blood from Arm, Left Updated: 07/19/23 1600     Blood Culture No growth at 24 hours    Blood Culture - Blood, Arm, Right [[id number]]  (Normal) Collected: 07/18/23 1535    Lab Status: Preliminary result Specimen: Blood from Arm, Right Updated: 07/19/23 1600     Blood Culture No growth at 24 hours    MRSA Screen, PCR (Inpatient) - Swab, Nares [[id number]]  (Normal) Collected: 07/19/23 0542    Lab Status: Final result Specimen: Swab from Nares Updated: 07/19/23 0840     MRSA PCR Negative    Narrative:      The negative predictive value of this diagnostic test is high and should only be used to consider de-escalating anti-MRSA therapy. A positive result may indicate colonization with MRSA and must be correlated clinically.  MRSA Negative    S. Pneumo Ag Urine or CSF - Urine, Urine, Clean Catch [[id number]]  (Normal) Collected: 07/18/23 1548    Lab Status: Final result Specimen: Urine, Clean Catch Updated: 07/19/23 0117     Strep Pneumo Ag Negative    Legionella Antigen, Urine - Urine, Urine, Clean Catch [[id number]]  (Normal) Collected: 07/18/23 1548    Lab Status: Final result Specimen: Urine, Clean Catch Updated: 07/19/23 0117     LEGIONELLA ANTIGEN, URINE Negative    Respiratory Panel PCR w/COVID-19(SARS-CoV-2)  CASSIDY/TAMELA/ARTURO/PAD/COR/MAD/GRETCHEN In-House, NP Swab in UTM/VTM, 3-4 HR TAT - Swab, Nasopharynx [[id number]]  (Normal) Collected: 07/18/23 1619    Lab Status: Final result Specimen: Swab from Nasopharynx Updated: 07/18/23 1716     ADENOVIRUS, PCR Not Detected     Coronavirus 229E Not Detected     Coronavirus HKU1 Not Detected     Coronavirus NL63 Not Detected     Coronavirus OC43 Not Detected     COVID19 Not Detected     Human Metapneumovirus Not Detected     Human Rhinovirus/Enterovirus Not Detected     Influenza A PCR Not Detected     Influenza B PCR Not Detected     Parainfluenza Virus 1 Not Detected     Parainfluenza Virus 2 Not Detected     Parainfluenza Virus 3 Not Detected     Parainfluenza Virus 4 Not Detected     RSV, PCR Not Detected     Bordetella pertussis pcr Not Detected     Bordetella parapertussis PCR Not Detected     Chlamydophila pneumoniae PCR Not Detected     Mycoplasma pneumo by PCR Not Detected    Narrative:      In the setting of a positive respiratory panel with a viral infection PLUS a negative procalcitonin without other underlying concern for bacterial infection, consider observing off antibiotics or discontinuation of antibiotics and continue supportive care. If the respiratory panel is positive for atypical bacterial infection (Bordetella pertussis, Chlamydophila pneumoniae, or Mycoplasma pneumoniae), consider antibiotic de-escalation to target atypical bacterial infection.            No radiology results from the last 24 hrs        Current medications:  Scheduled Meds:cefTRIAXone, 1,000 mg, Intravenous, Q24H  dilTIAZem CD, 120 mg, Oral, Daily  doxycycline, 100 mg, Oral, Q12H  famotidine, 20 mg, Oral, BID AC  folic acid, 1 mg, Oral, Daily  lactobacillus acidophilus, 1 capsule, Oral, Daily  losartan, 25 mg, Oral, Daily  metoprolol tartrate, 25 mg, Oral, Q12H  Pharmacy Meds to Bed Consult, , Does not apply, Daily  potassium chloride ER, 40 mEq, Oral, Q4H  pravastatin, 40 mg, Oral,  Nightly  senna-docusate sodium, 2 tablet, Oral, BID  sodium chloride, 10 mL, Intravenous, Q12H  warfarin, 3.5 mg, Oral, Daily      Continuous Infusions:Pharmacy to dose warfarin,       PRN Meds:.  acetaminophen    aluminum-magnesium hydroxide-simethicone    senna-docusate sodium **AND** polyethylene glycol **AND** bisacodyl **AND** bisacodyl    Calcium Replacement - Follow Nurse / BPA Driven Protocol    LORazepam    Magnesium Standard Dose Replacement - Follow Nurse / BPA Driven Protocol    melatonin    Morphine **AND** naloxone    ondansetron    oxyCODONE-acetaminophen    Pharmacy to dose warfarin    Phosphorus Replacement - Follow Nurse / BPA Driven Protocol    Potassium Replacement - Follow Nurse / BPA Driven Protocol    sodium chloride    sodium chloride    sodium chloride    Assessment & Plan   Assessment & Plan     Active Hospital Problems    Diagnosis  POA    **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]  Yes    Right lower lobe lung mass [R91.8]  Unknown    Metastasis [C79.9]  Unknown    Coronary artery disease with history of myocardial infarction without history of CABG [I25.10, I25.2]  Not Applicable    COPD (chronic obstructive pulmonary disease) [J44.9]  Unknown    Essential hypertension [I10]  Unknown    Hyperlipidemia [E78.5]  Unknown    Functional assessment declined [Z53.20]  Not Applicable    PAF (paroxysmal atrial fibrillation) [I48.0]  Unknown    Pneumonia of right lung due to infectious organism [J18.9]  Unknown    Supratherapeutic INR [R79.1]  Unknown    Lactic acidosis [E87.20]  Unknown    Leukocytosis [D72.829]  Unknown    CHF (congestive heart failure) [I50.9]  Unknown      Resolved Hospital Problems   No resolved problems to display.        Brief Hospital Course to date:  Phuc Bowden is a 82 y.o. male with pmh significant for CAD s/p MI and CABG, PAF on warfarin, skin cancer, HTN, HLD, hearing loss, previous tobacco use long term with COPD, GERD, presents with worsening low back pain x 2 months.   Patient had a recent fall and evaluation at that time showed concerns for bony mets.  Imaging also with liver mets as well as a right lung mass.  Patient was to have a biopsy at  however this was not done due to warfarin.  He has had progressive pain and weakness prompting evaluation.     Intractable Back Pain with functional decline/ FTT  Right lung mass with liver/ spinal mets  -- recent discovery of lung mass in June upon OSH ED with pending work up through [facility], however pain and debility increasing  -Oncology consulted.  Reviewed treatment be palliative in nature.  Family has opted to not undergo biopsy of right lung mass.  Patient received 1 dose of palliative radiation on 7/19.  Palliative care was consulted and medications adjusted for comfort.  -Plan for discharge home with outpatient hospice 7/21     Right sided pneumonia, post obstructive  Leukocytosis, lactic acidosis  -- zosyn and vanc started in ED. then continued on Rocephin and Doxy.  -Blood cultures no growth to date.  Strep and Legionella negative.  MRSA negative.  Respiratory panel negative.  -We will discontinue antibiotics with comfort focused plan.     PAF  Supratherapeutic INR  -Warfarin held for supratherapeutic INR  -- continue metoprolol and cardizem     HTN  HLD  CAD s/p CABG  CHF  -- continued  losartan, metoprolol, pravastatin    Expected Discharge Location and Transportation: Home hospice  Expected Discharge   Expected Discharge Date: 7/21/2023; Expected Discharge Time:      DVT prophylaxis:  Medical DVT prophylaxis orders are present.     AM-PAC 6 Clicks Score (PT): 16 (07/20/23 5926)    CODE STATUS:   Code Status and Medical Interventions:   Ordered at: 07/20/23 7667     Level Of Support Discussed With:    Next of Kin (If No Surrogate)     Code Status (Patient has no pulse and is not breathing):    No CPR (Do Not Attempt to Resuscitate)     Medical Interventions (Patient has pulse or is breathing):    Comfort Measures       Maria E  "DO Shira  07/20/23        Electronically signed by Maria E Silva DO at 07/20/23 1640       Jazmine Jamison APRN at 07/20/23 1133          Palliative Care Daily Progress Note     C/C: Patient denies pain, appears restless.     S: Medical record reviewed. Follow up visit for medication effectiveness and GOC in the context of complex medical decision making. Events noted. Patient sitting up in chair, attempting to get out of chair, asking about going home, easily reoriented. Family at bedside. Daughter reports patient's pain to left hip and right shoulder improved with Percocet 7/5-325mg PO. Patient with hallucinations which appear to be improved with Ativan per daughter. Patient with one palliative radiation treatment to left hip/L2-3 yesterday.     ROS: +pain, left leg and right shoulder, pain with movement. +decreased PO intake. +debility, increased weakness and pain limiting mobility. Denies N/V, diarrhea, constipation, anxiety. ROS limited by patient's hearing deficit.      O: Code Status:   Code Status and Medical Interventions:   Ordered at: 07/18/23 1604     Medical Intervention Limits:    NO intubation (DNI)    NO cardioversion     Code Status (Patient has no pulse and is not breathing):    No CPR (Do Not Attempt to Resuscitate)     Medical Interventions (Patient has pulse or is breathing):    Limited Support      Advanced Directives: Advance Directive Status: Patient does not have advance directive   Goals of Care: Ongoing.   Palliative Performance Scale Score: 30%     BP 95/73 (BP Location: Left arm, Patient Position: Lying)   Pulse 112   Temp 97.5 °F (36.4 °C) (Oral)   Resp 18   Ht 177.8 cm (70\")   Wt 69.9 kg (154 lb)   SpO2 95%   BMI 22.10 kg/m²   No intake or output data in the 24 hours ending 07/20/23 1134    PE:  General Appearance:    Patient sitting in chair, awake, alert, [place], frail, A/C will appearing, cooperative, NAD   HEENT:    NC/AT, EOMI, anicteric, MMM, face relaxed   Neck:   " supple, trachea midline, no JVD   Lungs:     CTA bilat, diminished in bases; respirations regular, even and unlabored; RR 16-18 on exam    Heart:    RRR, normal S1 and S2, no M/R/G,    Abdomen:     Normal bowel sounds, soft, nontender, nondistended   G/U:   Deferred   MSK/Extremities:   Wasting, no edema   Pulses:   Pulses palpable and equal bilaterally   Skin:   Warm, dry   Neurologic:   [place], A/Ox1, pleasantly confused, reoriented easily, [place]AG[place]   Psych:   intermittent agitation, appropriate     Meds: Reviewed and changes noted    Labs:   Results from last 7 days   Lab Units 07/20/23  0747   WBC 10*3/mm3 12.76*   HEMOGLOBIN g/dL 11.6*   HEMATOCRIT % 34.9*   PLATELETS 10*3/mm3 211     Results from last 7 days   Lab Units 07/20/23  0747   SODIUM mmol/L 136   POTASSIUM mmol/L 3.0*   CHLORIDE mmol/L 102   CO2 mmol/L 20.0*   BUN mg/dL 15   CREATININE mg/dL 0.47*   GLUCOSE mg/dL 99   CALCIUM mg/dL 9.3     Results from last 7 days   Lab Units 07/20/23  0747 07/19/23  1804 07/19/23  0656   SODIUM mmol/L 136  --  137   POTASSIUM mmol/L 3.0*   < > 3.6   CHLORIDE mmol/L 102  --  102   CO2 mmol/L 20.0*  --  18.0*   BUN mg/dL 15  --  15   CREATININE mg/dL 0.47*  --  0.56*   CALCIUM mg/dL 9.3  --  8.9   BILIRUBIN mg/dL  --   --  1.6*   ALK PHOS U/L  --   --  230*   ALT (SGPT) U/L  --   --  12   AST (SGOT) U/L  --   --  24   GLUCOSE mg/dL 99  --  100*    < > = values in this interval not displayed.     Imaging Results (Last 72 Hours)       Procedure Component Value Units Date/Time    MRI Lumbar Spine With & Without Contrast [[id number]] Collected: 07/18/23 1507     Updated: 07/18/23 1535    Narrative:        MRI LUMBAR SPINE W WO CONTRAST    Date of Exam: 7/18/2023 2:27 PM EDT    Indication: Low back pain.  Bilateral lower extremity weakness and decreased sensation.  Reported lesion in the lumbar spine from outside hospital..     Comparison: CT abdomen pelvis from June 7, 2023    Technique:  Routine multiplanar/multisequence sequence  images of the lumbar spine were obtained before and after the uneventful administration of 14 mL Multihance.        Findings:  The alignment is anatomic. The vertebral body heights appear normal. There are multiple T1 hypointense enhancing lesions most prominent within the L3 vertebral body most compatible with metastasis. There is an associated mild compression deformity at L3,   likely a pathological fracture. No epidural spread of tumor is identified. There are degenerative endplate changes at L3-4 and L4-5. There is disc desiccation at all levels, with moderate degenerative loss of disc height at L4-5. The conus terminates at   the T12-L1 level. The posterior paravertebral soft tissues are unremarkable.    L1-2: Mild disc bulge. Mild bilateral facet arthropathy. No spinal canal stenosis. No neural foraminal stenosis.    L2-3: Mild disc bulge eccentric to the left. Mild right and moderate facet arthropathy with ligamentum flavum infolding. Mild spinal canal stenosis. Mild right and mild to moderate left neural foraminal stenosis.    L3-4: Mild disc bulge. Moderate bilateral facet arthropathy with ligamentum flavum infolding. Mild to moderate spinal canal stenosis. Mild to moderate right and moderate left neural foramen stenosis.    L4-5: Moderate disc bulge. Moderate bilateral facet arthropathy with ligamentum flavum infolding. Mild spinal canal stenosis. Moderate right and mild to moderate left neural foraminal stenosis.    L5-S1: Mild disc bulge eccentric to the right narrowing the right lateral recess. Moderate right and mild left facet arthropathy. No spinal canal stenosis. Moderate right and mild left neural foraminal stenosis.      Impression:      Impression:  1.Multiple enhancing lesions within lumbar spine most prominent at L3, most compatible with osseous metastasis. There is an associated mild compression deformity at L3, likely a pathological fracture.  2.Mild to moderate multilevel degenerative  "changes of lumbar spine as described above.        Electronically Signed: Ruddy Rodriguez    7/18/2023 3:32 PM EDT    Workstation ID: [id number]    CT Abdomen Pelvis With Contrast [[id number]] Collected: 07/18/23 1405     Updated: 07/18/23 1420    Narrative:      CT ABDOMEN PELVIS W CONTRAST    Date of Exam: 7/18/2023 1:08 PM EDT    Indication: Low back pain with reported lesion to the bar spine at outside 20.  Weight loss and poor appetite.  Admitted 1 month ago side hospital for \"abdominal infection \".    Comparison: June 7, 2023    Technique: Axial CT images were obtained of the abdomen and pelvis following the uneventful intravenous administration of 85 mL Isovue-370. Reconstructed coronal and sagittal images were also obtained. Automated exposure control and iterative   construction methods were used.      Findings:  Hypodense lesions within the liver appear increased in size and number. An index lesion within the left hepatic lobe on image 33 measures 3.6 x 3.1 cm, previously 1.1 x 1.0 cm. The gallbladder, adrenal glands, right kidney, spleen, and pancreas are   unremarkable. There are small left renal cyst.    The stomach appears normal. The small bowel appears normal in caliber and configuration. The colon appears normal. The appendix appears normal. There is no ascites or loculated collection. No abnormally enlarged lymph nodes are identified.    The rectum and urinary bladder are unremarkable. The prostate is enlarged measuring 4.8 cm in transverse dimension.    Several osseous metastases have increased in size, for example an index lesion involving the right L3 vertebral body measuring 4.9 cm, previously 3.2 cm. Another metastasis is seen within the left ischium.      Impression:      Impression:  1.Progressive hypodense lesions within liver and progressive osseous metastasis, compatible with disease progression.  2.No acute process identified within abdomen/pelvis.  3.Enlarged " prostate.        Electronically Signed: Ruddy Michael    7/18/2023 2:17 PM EDT    Workstation ID: [id number]    CT Angiogram Chest [[id number]] Collected: 07/18/23 1340     Updated: 07/18/23 1409    Narrative:      CT ANGIOGRAM CHEST    Date of Exam: 7/18/2023 1:08 PM EDT    Indication: Per family, oxygen level 80% this morning.  Weight loss over the past 1 month.  Possible malignancy..    Comparison: None available.    Technique: Axial pulmonary arterial phase IV contrast enhanced CT angiogram of the chest per PE protocol. Two-dimensional reconstructions were postprocessed. Axial IV contrast-enhanced CT of the abdomen and pelvis with multiplanar reconstruction.    Findings:  CTA chest: There is no pathologic axillary adenopathy or other worrisome body wall soft tissue finding in the chest. Trace pleural effusion is present on the right. There is no pericardial effusion. Atherosclerotic, nonaneurysmal thoracic aorta. The   pulmonary arteries are patent and well opacified, without evidence of filling defect concerning for acute pulmonary embolus. There is no distinct pathologic mediastinal adenopathy. Evaluation of the osseous structures demonstrates no evidence of acute   fracture or aggressive osseous lesion. Evaluation of the lung fields demonstrates fairly extensive confluent consolidation in the right lower lobe, with some evidence of endobronchial obstruction, with underlying mass not entirely excluded. The remaining   lung fields demonstrate moderate emphysema and mild diffuse peribronchial groundglass opacity, suggesting component of bronchiolitis.      Impression:      Impression:  Prominent consolidation is noted in the right lower lobe, somewhat masslike with evidence of endobronchial obstruction, concerning for underlying possible primary or metastatic neoplasm. There is otherwise no suspicious thoracic adenopathy.    Diffuse areas of centrilobular groundglass opacity are present, suggesting component of  bronchiolitis. No additional acute findings are present. There is no acute pulmonary embolus.        Electronically Signed: Eder Mireles    7/18/2023 2:06 PM EDT    Workstation ID: [id number]                  Diagnostics: Reviewed    A:   Cancer associated pain    Right lower lobe lung mass    Metastasis    Coronary artery disease with history of myocardial infarction without history of CABG    COPD (chronic obstructive pulmonary disease)    Essential hypertension    Hyperlipidemia    Functional assessment declined    PAF (paroxysmal atrial fibrillation)    Pneumonia of right lung due to infectious organism    Supratherapeutic INR    Lactic acidosis    Leukocytosis    CHF (congestive heart failure)     82 y.o. 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 Agitation/Hallucinations -increased Lorazepam to 0.5mg PO q 4 hours prn agitation/hallucinations     3. Debility -due to disease progression, PT/OT unlikely to improve debility     4. Decreased PO intake -offer frequent snacks and supplements  -ordered Boost Plus vanilla or strawberry per patient preference  -SLP evaluation with regular texture, thin liquids     5. Constipation -prevention  -continue bowel regimen     6. GOC -DNR/DNI/Comfort Measures -per discussion with family  -patient/family electing comfort focused plan of care    P: Follow up visit for symptom management. Patient with hallucinations and agitation requiring increase in dose of Ativan. Pain well managed with Percocet 7/5-325mg PO. Plan is home with hospice tomorrow. Scripts to be sent for Percocet and Ativan per Palliative MD to [facility]. All questions and concerns addressed.   Palliative Care Team will continue to follow  patient. Please do not hesitate to contact us regarding further sx mgmt or GOC needs.  PILO Lorenzo  7/20/2023  Time spent: 35 minutes      Electronically signed by Jazmine Jamison APRN at 07/20/23 1251       Herlinda Olivier MD at 07/20/23 0905          DATE OF VISIT: 7/19/2023    Chief Complaint: Followup for metastatic right lower lobe lung cancer     SUBJECTIVE: The patient is laying comfortable in bed.  He had a rough night secondary to confusion and agitation. His daughter is at the bedside.    REVIEW OF SYSTEMS: All the other 9 systems are reviewed by me and negative  except what is mentioned in HPI.     Past History:  Medical History: has a past medical history of Anemia, Arthritis, Atrial fibrillation, Bowel obstruction, Bradycardia, CHF (congestive heart failure), COPD (chronic obstructive pulmonary disease), Coronary artery disease, GERD (gastroesophageal reflux disease), Hearing loss, Hyperlipidemia, Injury of back, Myocardial infarction, and Sciatica.   Surgical History: has a past surgical history that includes Coronary artery bypass graft; Cardiac surgery; Eye surgery; and Hemorrhoid surgery.   Family History: family history includes Lung cancer in his brother and sister.   Social History: reports that he has quit smoking. His smoking use included cigarettes. He has a 30.00 pack-year smoking history. He has never used smokeless tobacco. He reports that he does not drink alcohol and does not use drugs.    Medications Prior to Admission   Medication Sig Dispense Refill Last Dose    digoxin (LANOXIN) 125 MCG tablet Take 1 tablet by mouth Every Other Day.   7/17/2023 at 0900    finasteride (PROSCAR) 5 MG tablet Take 1 tablet by mouth Every Night.   7/17/2023 at 2300    folic acid (FOLVITE) 1 MG tablet Take 1 tablet by mouth Daily.   7/18/2023 at 0920    losartan (COZAAR) 25 MG tablet Take 80 mg by mouth Daily.   7/18/2023 at 0920    metoprolol tartrate (LOPRESSOR) 25 MG tablet Take 4 tablets by  mouth 2 (Two) Times a Day.   7/18/2023 at 0920    montelukast (SINGULAIR) 10 MG tablet Take 1 tablet by mouth 2 (Two) Times a Day.   7/18/2023 at 0920    pravastatin (PRAVACHOL) 40 MG tablet Take 1 tablet by mouth Daily.   7/17/2023 at 2300    tamsulosin (FLOMAX) 0.4 MG capsule 24 hr capsule Take 1 capsule by mouth Every Night.   7/17/2023 at 2300    warfarin (COUMADIN) 4 MG tablet Take 1 tablet by mouth Daily.   7/17/2023 at 2300    HYDROcodone-acetaminophen (NORCO)  MG per tablet Take 1 tablet by mouth Every 6 (Six) Hours As Needed for Moderate Pain.       nitroglycerin (NITROSTAT) 0.4 MG SL tablet Place 1 tablet under the tongue Every 5 (Five) Minutes As Needed for Chest Pain. Take no more than 3 doses in 15 minutes.   Unknown    traMADol (ULTRAM) 50 MG tablet Take 1 tablet by mouth 2 (Two) Times a Day As Needed for Moderate Pain.   Unknown      Allergies: Bee venom     Current Facility-Administered Medications:     acetaminophen (TYLENOL) tablet 650 mg, 650 mg, Oral, Q4H PRN, Charis Iniguez APRN    aluminum-magnesium hydroxide-simethicone (MAALOX MAX) 400-400-40 MG/5ML suspension 15 mL, 15 mL, Oral, Q6H PRN, Charis Iniguez APRN    sennosides-docusate (PERICOLACE) 8.6-50 MG per tablet 2 tablet, 2 tablet, Oral, BID, 2 tablet at 07/19/23 0958 **AND** polyethylene glycol (MIRALAX) packet 17 g, 17 g, Oral, Daily PRN **AND** bisacodyl (DULCOLAX) EC tablet 5 mg, 5 mg, Oral, Daily PRN **AND** bisacodyl (DULCOLAX) suppository 10 mg, 10 mg, Rectal, Daily PRN, Charis Iniguez R, APRN    Calcium Replacement - Follow Nurse / BPA Driven Protocol, , Does not apply, PRN, Charis Iniguez APRN    cefTRIAXone (ROCEPHIN) 1000 mg/100 mL 0.9% NS (MBP), 1,000 mg, Intravenous, Q24H, Maria E Silva DO    dilTIAZem CD (CARDIZEM CD) 24 hr capsule 120 mg, 120 mg, Oral, Daily, Charis Iniguez APRN, 120 mg at 07/19/23 0958    doxycycline (MONODOX) capsule 100 mg, 100 mg, Oral, Q12H, Maria E Silva DO, 100 mg at  07/19/23 0958    famotidine (PEPCID) tablet 20 mg, 20 mg, Oral, BID AC, Humberto Iniguezca DUGLAS, APRN, 20 mg at 07/19/23 0542    folic acid (FOLVITE) tablet 1 mg, 1 mg, Oral, Daily, Humberto Iniguezca R, APRN, 1 mg at 07/19/23 0958    lactobacillus acidophilus (RISAQUAD) capsule 1 capsule, 1 capsule, Oral, Daily, Humberto Iniguezca DUGLAS, APRN, 1 capsule at 07/19/23 0957    losartan (COZAAR) tablet 25 mg, 25 mg, Oral, Daily, Hira Charis R, APRN, 25 mg at 07/19/23 0958    Magnesium Standard Dose Replacement - Follow Nurse / BPA Driven Protocol, , Does not apply, PRN, Humberto Iniguezca DUGLAS, APRN    melatonin tablet 5 mg, 5 mg, Oral, Nightly PRN, Humberto Iniguezca DUGLAS, APRN, 5 mg at 07/18/23 2125    metoprolol tartrate (LOPRESSOR) tablet 25 mg, 25 mg, Oral, Q12H, Hira Charis R, APRN, 25 mg at 07/19/23 0958    morphine injection 2 mg, 2 mg, Intravenous, Q2H PRN **AND** naloxone (NARCAN) injection 0.4 mg, 0.4 mg, Intravenous, Q5 Min PRN, Jazmine Jamison APRN    ondansetron (ZOFRAN) injection 4 mg, 4 mg, Intravenous, Q6H PRN, Charis Iniguez, APRN    oxyCODONE-acetaminophen (PERCOCET) 7.5-325 MG per tablet 1 tablet, 1 tablet, Oral, Q4H PRN, Humberto Iniguezca R, APRN    Pharmacy to dose warfarin, , Does not apply, Continuous PRN, Charis Iniguez, APRN    Phosphorus Replacement - Follow Nurse / BPA Driven Protocol, , Does not apply, PRNHira Rebecca R, APRN    potassium chloride 10 mEq in 100 mL IVPB, 10 mEq, Intravenous, Q1H, Maria E Silva, , Last Rate: 100 mL/hr at 07/19/23 1143, 10 mEq at 07/19/23 1143    Potassium Replacement - Follow Nurse / BPA Driven Protocol, , Does not apply, PRN, Charis Iniguez, APRN    pravastatin (PRAVACHOL) tablet 40 mg, 40 mg, Oral, Nightly, Charis Iniguez APRN, 40 mg at 07/18/23 2125    sodium chloride 0.9 % flush 10 mL, 10 mL, Intravenous, PRN, Larry Johnson,     sodium chloride 0.9 % flush 10 mL, 10 mL, Intravenous, Q12H, Charis Iniguez APRN, 10 mL at 07/19/23 1001    sodium chloride  "0.9 % flush 10 mL, 10 mL, Intravenous, PRN, Iniguez, Charis R, APRN    sodium chloride 0.9 % infusion 40 mL, 40 mL, Intravenous, PRN, Iniguez, Charis R, APRN    warfarin (COUMADIN) (dosing per levels), , Does not apply, Daily, Vy Alston MD    PHYSICAL EXAMINATION:   /74 (BP Location: Left arm, Patient Position: Lying)   Pulse 114   Temp 97.9 °F (36.6 °C) (Oral)   Resp 18   Ht 177.8 cm (70\")   Wt 69.9 kg (154 lb)   SpO2 97%   BMI 22.10 kg/m²                ECOG Performance Status: 3 - Symptomatic, >50% confined to bed  GENERAL: Age appropriate. No acute distress.   NEURO/PSYCH: A&O x 3, strength 5/5 in all muscle groups  HEENT: Head atraumatic, normocephalic.   NECK: Supple. No JVD. No lymphadenopathy.   LUNGS: Clear to auscultation bilaterally. No wheezing. No rhonchi.   HEART: Regular rate and rhythm. S1, S2, no murmurs.   ABDOMEN: Soft, nontender, nondistended. Bowel sounds positive. No  hepatosplenomegaly.   EXTREMITIES: No clubbing, cyanosis, or edema.   SKIN: No rashes. No purpura.       Admission on 07/18/2023   Component Date Value Ref Range Status    QT Interval 07/18/2023 306  ms Preliminary    QTC Interval 07/18/2023 468  ms Preliminary    Protime 07/18/2023 53.3 (C)  12.2 - 14.5 Seconds Final    INR 07/18/2023 5.91 (C)  0.89 - 1.12 Final    Glucose 07/18/2023 115 (H)  65 - 99 mg/dL Final    BUN 07/18/2023 17  8 - 23 mg/dL Final    Creatinine 07/18/2023 0.98  0.76 - 1.27 mg/dL Final    Sodium 07/18/2023 136  136 - 145 mmol/L Final    Potassium 07/18/2023 4.1  3.5 - 5.2 mmol/L Final    Chloride 07/18/2023 97 (L)  98 - 107 mmol/L Final    CO2 07/18/2023 22.0  22.0 - 29.0 mmol/L Final    Calcium 07/18/2023 10.3  8.6 - 10.5 mg/dL Final    Total Protein 07/18/2023 7.7  6.0 - 8.5 g/dL Final    Albumin 07/18/2023 3.6  3.5 - 5.2 g/dL Final    ALT (SGPT) 07/18/2023 16  1 - 41 U/L Final    AST (SGOT) 07/18/2023 28  1 - 40 U/L Final    Alkaline Phosphatase 07/18/2023 270 (H)  39 - 117 U/L Final    " Total Bilirubin 07/18/2023 2.0 (H)  0.0 - 1.2 mg/dL Final    Globulin 07/18/2023 4.1  gm/dL Final    Calculated Result    A/G Ratio 07/18/2023 0.9  g/dL Final    BUN/Creatinine Ratio 07/18/2023 17.3  7.0 - 25.0 Final    Anion Gap 07/18/2023 17.0 (H)  5.0 - 15.0 mmol/L Final    eGFR 07/18/2023 77.5  >60.0 mL/min/1.73 Final    proBNP 07/18/2023 11,337.0 (H)  0.0 - 1,800.0 pg/mL Final    HS Troponin T 07/18/2023 21 (H)  <15 ng/L Final    Extra Tube 07/18/2023 Hold for add-ons.   Final    Auto resulted.    Extra Tube 07/18/2023 hold for add-on   Final    Auto resulted    Extra Tube 07/18/2023 Hold for add-ons.   Final    Auto resulted.    Extra Tube 07/18/2023 Hold for add-ons.   Final    Auto resulted.    Extra Tube 07/18/2023 Hold for add-ons.   Final    Auto resulted    WBC 07/18/2023 16.55 (H)  3.40 - 10.80 10*3/mm3 Final    RBC 07/18/2023 4.68  4.14 - 5.80 10*6/mm3 Final    Hemoglobin 07/18/2023 13.7  13.0 - 17.7 g/dL Final    Hematocrit 07/18/2023 42.1  37.5 - 51.0 % Final    MCV 07/18/2023 90.0  79.0 - 97.0 fL Final    MCH 07/18/2023 29.3  26.6 - 33.0 pg Final    MCHC 07/18/2023 32.5  31.5 - 35.7 g/dL Final    RDW 07/18/2023 14.0  12.3 - 15.4 % Final    RDW-SD 07/18/2023 45.5  37.0 - 54.0 fl Final    MPV 07/18/2023 9.3  6.0 - 12.0 fL Final    Platelets 07/18/2023 284  140 - 450 10*3/mm3 Final    Neutrophil % 07/18/2023 84.5 (H)  42.7 - 76.0 % Final    Lymphocyte % 07/18/2023 5.3 (L)  19.6 - 45.3 % Final    Monocyte % 07/18/2023 7.3  5.0 - 12.0 % Final    Eosinophil % 07/18/2023 0.1 (L)  0.3 - 6.2 % Final    Basophil % 07/18/2023 0.3  0.0 - 1.5 % Final    Immature Grans % 07/18/2023 2.5 (H)  0.0 - 0.5 % Final    Neutrophils, Absolute 07/18/2023 13.99 (H)  1.70 - 7.00 10*3/mm3 Final    Lymphocytes, Absolute 07/18/2023 0.87  0.70 - 3.10 10*3/mm3 Final    Monocytes, Absolute 07/18/2023 1.21 (H)  0.10 - 0.90 10*3/mm3 Final    Eosinophils, Absolute 07/18/2023 0.01  0.00 - 0.40 10*3/mm3 Final    Basophils, Absolute  07/18/2023 0.05  0.00 - 0.20 10*3/mm3 Final    Immature Grans, Absolute 07/18/2023 0.42 (H)  0.00 - 0.05 10*3/mm3 Final    nRBC 07/18/2023 0.0  0.0 - 0.2 /100 WBC Final    Creatine Kinase 07/18/2023 94  20 - 200 U/L Final    Lactate 07/18/2023 2.6 (C)  0.5 - 2.0 mmol/L Final    Falsely depressed results may occur on samples drawn from patients receiving N-Acetylcysteine (NAC) or Metamizole.    Procalcitonin 07/18/2023 0.27 (H)  0.00 - 0.25 ng/mL Final    Lactate 07/18/2023 1.4  0.5 - 2.0 mmol/L Final    Falsely depressed results may occur on samples drawn from patients receiving N-Acetylcysteine (NAC) or Metamizole.    Color, UA 07/18/2023 Yellow  Yellow, Straw Final    Appearance, UA 07/18/2023 Clear  Clear Final    pH, UA 07/18/2023 5.5  5.0 - 8.0 Final    Specific Gravity, UA 07/18/2023 1.073 (H)  1.001 - 1.030 Final    Glucose, UA 07/18/2023 Negative  Negative Final    Ketones, UA 07/18/2023 Negative  Negative Final    Bilirubin, UA 07/18/2023 Negative  Negative Final    Blood, UA 07/18/2023 Trace (A)  Negative Final    Protein, UA 07/18/2023 Trace (A)  Negative Final    Leuk Esterase, UA 07/18/2023 Negative  Negative Final    Nitrite, UA 07/18/2023 Negative  Negative Final    Urobilinogen, UA 07/18/2023 0.2 E.U./dL  0.2 - 1.0 E.U./dL Final    Strep Pneumo Ag 07/18/2023 Negative  Negative Final    LEGIONELLA ANTIGEN, URINE 07/18/2023 Negative  Negative Final    MRSA PCR 07/19/2023 Negative  Negative Final    ADENOVIRUS, PCR 07/18/2023 Not Detected  Not Detected Final    Coronavirus 229E 07/18/2023 Not Detected  Not Detected Final    Coronavirus HKU1 07/18/2023 Not Detected  Not Detected Final    Coronavirus NL63 07/18/2023 Not Detected  Not Detected Final    Coronavirus OC43 07/18/2023 Not Detected  Not Detected Final    COVID19 07/18/2023 Not Detected  Not Detected - Ref. Range Final    Human Metapneumovirus 07/18/2023 Not Detected  Not Detected Final    Human Rhinovirus/Enterovirus 07/18/2023 Not Detected  Not  Detected Final    Influenza A PCR 07/18/2023 Not Detected  Not Detected Final    Influenza B PCR 07/18/2023 Not Detected  Not Detected Final    Parainfluenza Virus 1 07/18/2023 Not Detected  Not Detected Final    Parainfluenza Virus 2 07/18/2023 Not Detected  Not Detected Final    Parainfluenza Virus 3 07/18/2023 Not Detected  Not Detected Final    Parainfluenza Virus 4 07/18/2023 Not Detected  Not Detected Final    RSV, PCR 07/18/2023 Not Detected  Not Detected Final    Bordetella pertussis pcr 07/18/2023 Not Detected  Not Detected Final    Bordetella parapertussis PCR 07/18/2023 Not Detected  Not Detected Final    Chlamydophila pneumoniae PCR 07/18/2023 Not Detected  Not Detected Final    Mycoplasma pneumo by PCR 07/18/2023 Not Detected  Not Detected Final    RBC, UA 07/18/2023 0-2  None Seen, 0-2 /HPF Final    WBC, UA 07/18/2023 3-5 (A)  None Seen, 0-2 /HPF Final    Urine culture not indicated.    Bacteria, UA 07/18/2023 None Seen  None Seen, Trace /HPF Final    Squamous Epithelial Cells, UA 07/18/2023 3-6 (A)  None Seen, 0-2 /HPF Final    Hyaline Casts, UA 07/18/2023 0-6  0 - 6 /LPF Final    Methodology 07/18/2023 Automated Microscopy   Final    Creatinine 07/18/2023 0.90  0.60 - 1.30 mg/dL Final    Serial Number: [id number]:  [id number]    WBC 07/19/2023 12.67 (H)  3.40 - 10.80 10*3/mm3 Final    RBC 07/19/2023 3.94 (L)  4.14 - 5.80 10*6/mm3 Final    Hemoglobin 07/19/2023 11.7 (L)  13.0 - 17.7 g/dL Final    Hematocrit 07/19/2023 34.6 (L)  37.5 - 51.0 % Final    MCV 07/19/2023 87.8  79.0 - 97.0 fL Final    MCH 07/19/2023 29.7  26.6 - 33.0 pg Final    MCHC 07/19/2023 33.8  31.5 - 35.7 g/dL Final    RDW 07/19/2023 13.9  12.3 - 15.4 % Final    RDW-SD 07/19/2023 44.1  37.0 - 54.0 fl Final    MPV 07/19/2023 9.9  6.0 - 12.0 fL Final    Platelets 07/19/2023 206  140 - 450 10*3/mm3 Final    Neutrophil % 07/19/2023 83.9 (H)  42.7 - 76.0 % Final    Lymphocyte % 07/19/2023 6.2 (L)  19.6 - 45.3 % Final    Monocyte %  07/19/2023 8.4  5.0 - 12.0 % Final    Eosinophil % 07/19/2023 0.0 (L)  0.3 - 6.2 % Final    Basophil % 07/19/2023 0.2  0.0 - 1.5 % Final    Immature Grans % 07/19/2023 1.3 (H)  0.0 - 0.5 % Final    Neutrophils, Absolute 07/19/2023 10.63 (H)  1.70 - 7.00 10*3/mm3 Final    Lymphocytes, Absolute 07/19/2023 0.78  0.70 - 3.10 10*3/mm3 Final    Monocytes, Absolute 07/19/2023 1.07 (H)  0.10 - 0.90 10*3/mm3 Final    Eosinophils, Absolute 07/19/2023 0.00  0.00 - 0.40 10*3/mm3 Final    Basophils, Absolute 07/19/2023 0.03  0.00 - 0.20 10*3/mm3 Final    Immature Grans, Absolute 07/19/2023 0.16 (H)  0.00 - 0.05 10*3/mm3 Final    nRBC 07/19/2023 0.0  0.0 - 0.2 /100 WBC Final    Glucose 07/19/2023 100 (H)  65 - 99 mg/dL Final    BUN 07/19/2023 15  8 - 23 mg/dL Final    Creatinine 07/19/2023 0.56 (L)  0.76 - 1.27 mg/dL Final    Sodium 07/19/2023 137  136 - 145 mmol/L Final    Potassium 07/19/2023 3.6  3.5 - 5.2 mmol/L Final    Slight hemolysis detected by analyzer. Results may be affected.    Chloride 07/19/2023 102  98 - 107 mmol/L Final    CO2 07/19/2023 18.0 (L)  22.0 - 29.0 mmol/L Final    Calcium 07/19/2023 8.9  8.6 - 10.5 mg/dL Final    Total Protein 07/19/2023 5.7 (L)  6.0 - 8.5 g/dL Final    Albumin 07/19/2023 2.9 (L)  3.5 - 5.2 g/dL Final    ALT (SGPT) 07/19/2023 12  1 - 41 U/L Final    AST (SGOT) 07/19/2023 24  1 - 40 U/L Final    Alkaline Phosphatase 07/19/2023 230 (H)  39 - 117 U/L Final    Total Bilirubin 07/19/2023 1.6 (H)  0.0 - 1.2 mg/dL Final    Globulin 07/19/2023 2.8  gm/dL Final    Calculated Result    A/G Ratio 07/19/2023 1.0  g/dL Final    BUN/Creatinine Ratio 07/19/2023 26.8 (H)  7.0 - 25.0 Final    Anion Gap 07/19/2023 17.0 (H)  5.0 - 15.0 mmol/L Final    eGFR 07/19/2023 99.0  >60.0 mL/min/1.73 Final    Magnesium 07/19/2023 1.8  1.6 - 2.4 mg/dL Final    Protime 07/19/2023 35.3 (H)  12.2 - 14.5 Seconds Final    INR 07/19/2023 3.49 (H)  0.89 - 1.12 Final       No results found.    ASSESSMENT: The patient is a  very pleasant 82 y.o. male  with metastatic right lower lobe lung cancer      PLAN:  1.  Metastatic right lower lobe lung cancer:  A.  The patient and his family has decided to proceed with palliative approach with symptoms management only.  I completely agree with his decision given his advanced stage disease, and poor performance status.  B.  I will sign off please call with questions.    2.  Symptomatic bone metastases:  A.  Patient agreed for palliative radiotherapy.    3.  Postobstructive pneumonia:  A.  Patient is on antibiotics    4.  Cancer related pain:  A.  Recommend hospice consult.    Herlinda Olivier MD  7/19/2023      Electronically signed by Herlinda Olivier MD at 07/20/23 0915       Kelley Armenta MD at 07/19/23 2918            Subjective     PROBLEM LIST:    Cancer associated pain    Right lower lobe lung mass    Metastasis    Coronary artery disease with history of myocardial infarction without history of CABG    COPD (chronic obstructive pulmonary disease)    Essential hypertension    Hyperlipidemia    Functional assessment declined    PAF (paroxysmal atrial fibrillation)    Pneumonia of right lung due to infectious organism    Supratherapeutic INR    Lactic acidosis    Leukocytosis    CHF (congestive heart failure)         CHIEF COMPLAINT: Metastatic cancer    HISTORY OF PRESENT ILLNESS:  The patient is a 82 y.o. male, referred  for evaluation of presumed metastatic lung cancer.  History mostly obtained from his daughter and son at the bedside.    He was fairly active and healthy until a few months ago when he started having pain.  He was found to have a lung mass with metastasis involving the liver and bone.    He received palliative radiotherapy to the spine today.    REVIEW OF SYSTEMS:  A 14 point review of systems was performed and is negative except as noted above.    Past Medical History:   Diagnosis Date    Anemia     Arthritis     Atrial fibrillation     Bowel obstruction     Bradycardia      CHF (congestive heart failure)     COPD (chronic obstructive pulmonary disease)     Coronary artery disease     GERD (gastroesophageal reflux disease)     Hearing loss     Hyperlipidemia     Injury of back     Myocardial infarction     Sciatica        No current facility-administered medications on file prior to encounter.     Current Outpatient Medications on File Prior to Encounter   Medication Sig Dispense Refill    digoxin (LANOXIN) 125 MCG tablet Take 1 tablet by mouth Every Other Day.      finasteride (PROSCAR) 5 MG tablet Take 1 tablet by mouth Every Night.      folic acid (FOLVITE) 1 MG tablet Take 1 tablet by mouth Daily.      losartan (COZAAR) 25 MG tablet Take 80 mg by mouth Daily.      metoprolol tartrate (LOPRESSOR) 25 MG tablet Take 4 tablets by mouth 2 (Two) Times a Day.      montelukast (SINGULAIR) 10 MG tablet Take 1 tablet by mouth 2 (Two) Times a Day.      pravastatin (PRAVACHOL) 40 MG tablet Take 1 tablet by mouth Daily.      tamsulosin (FLOMAX) 0.4 MG capsule 24 hr capsule Take 1 capsule by mouth Every Night.      warfarin (COUMADIN) 4 MG tablet Take 1 tablet by mouth Daily.      HYDROcodone-acetaminophen (NORCO)  MG per tablet Take 1 tablet by mouth Every 6 (Six) Hours As Needed for Moderate Pain.      nitroglycerin (NITROSTAT) 0.4 MG SL tablet Place 1 tablet under the tongue Every 5 (Five) Minutes As Needed for Chest Pain. Take no more than 3 doses in 15 minutes.      traMADol (ULTRAM) 50 MG tablet Take 1 tablet by mouth 2 (Two) Times a Day As Needed for Moderate Pain.         Allergies   Allergen Reactions    Bee Venom Anaphylaxis       Past Surgical History:   Procedure Laterality Date    CARDIAC SURGERY      2006    CORONARY ARTERY BYPASS GRAFT      EYE SURGERY      HEMORRHOIDECTOMY         OB History   No obstetric history on file.       Social History     Socioeconomic History    Marital status:    Tobacco Use    Smoking status: Former     Packs/day: 2.00     Years: 15.00      Pack years: 30.00     Types: Cigarettes    Smokeless tobacco: Never   Vaping Use    Vaping Use: Never used   Substance and Sexual Activity    Alcohol use: Never    Drug use: Never    Sexual activity: Defer       Family History   Problem Relation Age of Onset    Lung cancer Sister     Lung cancer Brother        Objective     Vitals:    07/19/23 0600 07/19/23 0742 07/19/23 0958 07/19/23 1203   BP:  107/69 111/75 129/74   BP Location:  Left arm  Left arm   Patient Position:  Lying  Lying   Pulse: 114  114 114   Resp:  18  18   Temp:  98.1 °F (36.7 °C)  97.9 °F (36.6 °C)   TempSrc:  Oral  Oral   SpO2: 96%   97%   Weight:       Height:                       Performance Status: 3  General: well appearing male in no acute distress  Neuro: Resighini  HEENT: sclerae anicteric, oropharynx clear  Lymphatics: no cervical, supraclavicular, or axillary adenopathy  Cardiovascular: regular rate and rhythm, no murmurs  Lungs:decreased right base  Abdomen: soft, nontender, nondistended.  No palpable organomegaly  Extremities: no lower extremity edema  Skin: no rashes, lesions, bruising, or petechiae  Psych: mood and affect appropriate    Lab Results   Component Value Date    WBC 12.67 (H) 07/19/2023    HGB 11.7 (L) 07/19/2023    HCT 34.6 (L) 07/19/2023    MCV 87.8 07/19/2023     07/19/2023     Lab Results   Component Value Date    GLUCOSE 100 (H) 07/19/2023    BUN 15 07/19/2023    CREATININE 0.56 (L) 07/19/2023    EGFRIFNONA 82 03/15/2018    BCR 26.8 (H) 07/19/2023    K 3.6 07/19/2023    CO2 18.0 (L) 07/19/2023    CALCIUM 8.9 07/19/2023    ALBUMIN 2.9 (L) 07/19/2023    AST 24 07/19/2023    ALT 12 07/19/2023     MRI Lumbar Spine With & Without Contrast  Narrative: MRI LUMBAR SPINE W WO CONTRAST    Date of Exam: 7/18/2023 2:27 PM EDT    Indication: Low back pain.  Bilateral lower extremity weakness and decreased sensation.  Reported lesion in the lumbar spine from outside hospital..     Comparison: CT abdomen pelvis from June 7,  2023    Technique:  Routine multiplanar/multisequence sequence images of the lumbar spine were obtained before and after the uneventful administration of 14 mL Multihance.      Findings:  The alignment is anatomic. The vertebral body heights appear normal. There are multiple T1 hypointense enhancing lesions most prominent within the L3 vertebral body most compatible with metastasis. There is an associated mild compression deformity at L3,   likely a pathological fracture. No epidural spread of tumor is identified. There are degenerative endplate changes at L3-4 and L4-5. There is disc desiccation at all levels, with moderate degenerative loss of disc height at L4-5. The conus terminates at   the T12-L1 level. The posterior paravertebral soft tissues are unremarkable.    L1-2: Mild disc bulge. Mild bilateral facet arthropathy. No spinal canal stenosis. No neural foraminal stenosis.    L2-3: Mild disc bulge eccentric to the left. Mild right and moderate facet arthropathy with ligamentum flavum infolding. Mild spinal canal stenosis. Mild right and mild to moderate left neural foraminal stenosis.    L3-4: Mild disc bulge. Moderate bilateral facet arthropathy with ligamentum flavum infolding. Mild to moderate spinal canal stenosis. Mild to moderate right and moderate left neural foramen stenosis.    L4-5: Moderate disc bulge. Moderate bilateral facet arthropathy with ligamentum flavum infolding. Mild spinal canal stenosis. Moderate right and mild to moderate left neural foraminal stenosis.    L5-S1: Mild disc bulge eccentric to the right narrowing the right lateral recess. Moderate right and mild left facet arthropathy. No spinal canal stenosis. Moderate right and mild left neural foraminal stenosis.  Impression: Impression:  1.Multiple enhancing lesions within lumbar spine most prominent at L3, most compatible with osseous metastasis. There is an associated mild compression deformity at L3, likely a pathological  "fracture.  2.Mild to moderate multilevel degenerative changes of lumbar spine as described above.    Electronically Signed: Ruddy Rodriguez    7/18/2023 3:32 PM EDT    Workstation ID: [id number]  CT Abdomen Pelvis With Contrast  Narrative: CT ABDOMEN PELVIS W CONTRAST    Date of Exam: 7/18/2023 1:08 PM EDT    Indication: Low back pain with reported lesion to the bar spine at outside 20.  Weight loss and poor appetite.  Admitted 1 month ago side hospital for \"abdominal infection \".    Comparison: June 7, 2023    Technique: Axial CT images were obtained of the abdomen and pelvis following the uneventful intravenous administration of 85 mL Isovue-370. Reconstructed coronal and sagittal images were also obtained. Automated exposure control and iterative   construction methods were used.    Findings:  Hypodense lesions within the liver appear increased in size and number. An index lesion within the left hepatic lobe on image 33 measures 3.6 x 3.1 cm, previously 1.1 x 1.0 cm. The gallbladder, adrenal glands, right kidney, spleen, and pancreas are   unremarkable. There are small left renal cyst.    The stomach appears normal. The small bowel appears normal in caliber and configuration. The colon appears normal. The appendix appears normal. There is no ascites or loculated collection. No abnormally enlarged lymph nodes are identified.    The rectum and urinary bladder are unremarkable. The prostate is enlarged measuring 4.8 cm in transverse dimension.    Several osseous metastases have increased in size, for example an index lesion involving the right L3 vertebral body measuring 4.9 cm, previously 3.2 cm. Another metastasis is seen within the left ischium.  Impression: Impression:  1.Progressive hypodense lesions within liver and progressive osseous metastasis, compatible with disease progression.  2.No acute process identified within abdomen/pelvis.  3.Enlarged prostate.    Electronically Signed: Ruddy Rodriguez    7/18/2023 " 2:17 PM EDT    Workstation ID: [id number]  CT Angiogram Chest  Narrative: CT ANGIOGRAM CHEST    Date of Exam: 7/18/2023 1:08 PM EDT    Indication: Per family, oxygen level 80% this morning.  Weight loss over the past 1 month.  Possible malignancy..    Comparison: None available.    Technique: Axial pulmonary arterial phase IV contrast enhanced CT angiogram of the chest per PE protocol. Two-dimensional reconstructions were postprocessed. Axial IV contrast-enhanced CT of the abdomen and pelvis with multiplanar reconstruction.    Findings:  CTA chest: There is no pathologic axillary adenopathy or other worrisome body wall soft tissue finding in the chest. Trace pleural effusion is present on the right. There is no pericardial effusion. Atherosclerotic, nonaneurysmal thoracic aorta. The   pulmonary arteries are patent and well opacified, without evidence of filling defect concerning for acute pulmonary embolus. There is no distinct pathologic mediastinal adenopathy. Evaluation of the osseous structures demonstrates no evidence of acute   fracture or aggressive osseous lesion. Evaluation of the lung fields demonstrates fairly extensive confluent consolidation in the right lower lobe, with some evidence of endobronchial obstruction, with underlying mass not entirely excluded. The remaining   lung fields demonstrate moderate emphysema and mild diffuse peribronchial groundglass opacity, suggesting component of bronchiolitis.  Impression: Impression:  Prominent consolidation is noted in the right lower lobe, somewhat masslike with evidence of endobronchial obstruction, concerning for underlying possible primary or metastatic neoplasm. There is otherwise no suspicious thoracic adenopathy.    Diffuse areas of centrilobular groundglass opacity are present, suggesting component of bronchiolitis. No additional acute findings are present. There is no acute pulmonary embolus.    Electronically Signed: Eder Mireles    7/18/2023 2:06  PM EDT    Workstation ID: [id number]          Assessment & Plan     Phuc Bowden is a 82 y.o. male with presumed metastatic lung cancer.    Agree with the previously noted plan of no biopsy and palliative radiotherapy.  Discussed potential expectations and prognosis with his children at the bedside.  Hospice care appropriate.    We will sign off, please call with further questions.          Kelley Armenta MD    2023           Electronically signed by Kelley Armenta MD at 23 [phone]       Maria E Silva DO at 23 1026              [facility]  PROGRESS NOTE    Patient Name: Phuc Bowden  : 1940  MRN: [id number]    Date of Admission: 2023  Primary Care Physician: Blayne Adan MD    Subjective   Subjective     CC:  Lower extremity pain    HPI:  Patient with continued pain overnight.  Difficulty rolling due to pain.  States that his right shoulder hurts due to rotator cuff tear.  Son at bedside and states they have declined biopsy.    ROS:  Gen- No fevers, chills  CV- No chest pain, palpitations  Resp- No cough, dyspnea  GI- No N/V/D, abd pain    Objective   Objective     Vital Signs:   Temp:  [97.5 °F (36.4 °C)-99.2 °F (37.3 °C)] 98.1 °F (36.7 °C)  Heart Rate:  [] 114  Resp:  [16-20] 18  BP: (103-147)/(57-98) 111/75     Physical Exam:  Constitutional: No acute distress, awake, alert  HENT: NCAT, mucous membranes moist; hard of hearing   Respiratory: diminished; poor effort  Cardiovascular: tachy, irregular, no murmurs, rubs, or gallops  Gastrointestinal: Positive bowel sounds, soft, nontender, nondistended  Musculoskeletal: No bilateral ankle edema  Psychiatric: Appropriate affect, cooperative  Neurologic: Oriented x 3, strength symmetric in all extremities, Cranial Nerves grossly intact to confrontation, speech clear  Skin: No rashes      Results Reviewed:  LAB RESULTS:      Lab 23  0656 23  1534 23  1331 23  1251    WBC 12.67*  --   --  16.55*   HEMOGLOBIN 11.7*  --   --  13.7   HEMATOCRIT 34.6*  --   --  42.1   PLATELETS 206  --   --  284   NEUTROS ABS 10.63*  --   --  13.99*   IMMATURE GRANS (ABS) 0.16*  --   --  0.42*   LYMPHS ABS 0.78  --   --  0.87   MONOS ABS 1.07*  --   --  1.21*   EOS ABS 0.00  --   --  0.01   MCV 87.8  --   --  90.0   PROCALCITONIN  --   --  0.27*  --    LACTATE  --  1.4  --  2.6*   PROTIME 35.3*  --   --  53.3*         Lab 07/19/23  0656 07/18/23  1331 07/18/23  1253   SODIUM 137 136  --    POTASSIUM 3.6 4.1  --    CHLORIDE 102 97*  --    CO2 18.0* 22.0  --    ANION GAP 17.0* 17.0*  --    BUN 15 17  --    CREATININE 0.56* 0.98 0.90   EGFR 99.0 77.5  --    GLUCOSE 100* 115*  --    CALCIUM 8.9 10.3  --    MAGNESIUM 1.8  --   --          Lab 07/19/23  0656 07/18/23  1331   TOTAL PROTEIN 5.7* 7.7   ALBUMIN 2.9* 3.6   GLOBULIN 2.8 4.1   ALT (SGPT) 12 16   AST (SGOT) 24 28   BILIRUBIN 1.6* 2.0*   ALK PHOS 230* 270*         Lab 07/19/23  0656 07/18/23  1331 07/18/23  1251   PROBNP  --  11,337.0*  --    HSTROP T  --  21*  --    PROTIME 35.3*  --  53.3*   INR 3.49*  --  5.91*                 Brief Urine Lab Results  (Last result in the past 365 days)        Color   Clarity   Blood   Leuk Est   Nitrite   Protein   CREAT   Urine HCG        07/18/23 1548 Yellow   Clear   Trace   Negative   Negative   Trace                   Microbiology Results Abnormal       Procedure Component Value - Date/Time    MRSA Screen, PCR (Inpatient) - Swab, Nares [[id number]]  (Normal) Collected: 07/19/23 0542    Lab Status: Final result Specimen: Swab from Nares Updated: 07/19/23 0840     MRSA PCR Negative    Narrative:      The negative predictive value of this diagnostic test is high and should only be used to consider de-escalating anti-MRSA therapy. A positive result may indicate colonization with MRSA and must be correlated clinically.  MRSA Negative    S. Pneumo Ag Urine or CSF - Urine, Urine, Clean Catch [[id number]]  (Normal)  Collected: 07/18/23 1548    Lab Status: Final result Specimen: Urine, Clean Catch Updated: 07/19/23 0117     Strep Pneumo Ag Negative    Legionella Antigen, Urine - Urine, Urine, Clean Catch [[id number]]  (Normal) Collected: 07/18/23 1548    Lab Status: Final result Specimen: Urine, Clean Catch Updated: 07/19/23 0117     LEGIONELLA ANTIGEN, URINE Negative    Respiratory Panel PCR w/COVID-19(SARS-CoV-2) CASSIDY/TAMELA/ARTURO/PAD/COR/MAD/GRETCHEN In-House, NP Swab in UTM/VTM, 3-4 HR TAT - Swab, Nasopharynx [[id number]]  (Normal) Collected: 07/18/23 1619    Lab Status: Final result Specimen: Swab from Nasopharynx Updated: 07/18/23 1716     ADENOVIRUS, PCR Not Detected     Coronavirus 229E Not Detected     Coronavirus HKU1 Not Detected     Coronavirus NL63 Not Detected     Coronavirus OC43 Not Detected     COVID19 Not Detected     Human Metapneumovirus Not Detected     Human Rhinovirus/Enterovirus Not Detected     Influenza A PCR Not Detected     Influenza B PCR Not Detected     Parainfluenza Virus 1 Not Detected     Parainfluenza Virus 2 Not Detected     Parainfluenza Virus 3 Not Detected     Parainfluenza Virus 4 Not Detected     RSV, PCR Not Detected     Bordetella pertussis pcr Not Detected     Bordetella parapertussis PCR Not Detected     Chlamydophila pneumoniae PCR Not Detected     Mycoplasma pneumo by PCR Not Detected    Narrative:      In the setting of a positive respiratory panel with a viral infection PLUS a negative procalcitonin without other underlying concern for bacterial infection, consider observing off antibiotics or discontinuation of antibiotics and continue supportive care. If the respiratory panel is positive for atypical bacterial infection (Bordetella pertussis, Chlamydophila pneumoniae, or Mycoplasma pneumoniae), consider antibiotic de-escalation to target atypical bacterial infection.            CT Abdomen Pelvis With Contrast    Result Date: 7/18/2023  CT ABDOMEN PELVIS W CONTRAST Date of Exam: 7/18/2023  "1:08 PM EDT Indication: Low back pain with reported lesion to the bar spine at outside 20.  Weight loss and poor appetite.  Ad[facility] for \"abdominal infection \". Comparison: June 7, 2023 Technique: Axial CT images were obtained of the abdomen and pelvis following the uneventful intravenous administration of 85 mL Isovue-370. Reconstructed coronal and sagittal images were also obtained. Automated exposure control and iterative construction methods were used. Findings: Hypodense lesions within the liver appear increased in size and number. An index lesion within the left hepatic lobe on image 33 measures 3.6 x 3.1 cm, previously 1.1 x 1.0 cm. The gallbladder, adrenal glands, right kidney, spleen, and pancreas are unremarkable. There are small left renal cyst. The stomach appears normal. The small bowel appears normal in caliber and configuration. The colon appears normal. The appendix appears normal. There is no ascites or loculated collection. No abnormally enlarged lymph nodes are identified. The rectum and urinary bladder are unremarkable. The prostate is enlarged measuring 4.8 cm in transverse dimension. Several osseous metastases have increased in size, for example an index lesion involving the right L3 vertebral body measuring 4.9 cm, previously 3.2 cm. Another metastasis is seen within the left ischium.     Impression: Impression: 1.Progressive hypodense lesions within liver and progressive osseous metastasis, compatible with disease progression. 2.No acute process identified within abdomen/pelvis. 3.Enlarged prostate. Electronically Signed: Ruddy Rodriguez  7/18/2023 2:17 PM EDT  Workstation ID: [id number]    CT Angiogram Chest    Result Date: 7/18/2023  CT ANGIOGRAM CHEST Date of Exam: 7/18/2023 1:08 PM EDT Indication: Per family, oxygen level 80% this morning.  Weight loss over the past 1 month.  Possible malignancy.. Comparison: None available. Technique: Axial pulmonary arterial " phase IV contrast enhanced CT angiogram of the chest per PE protocol. Two-dimensional reconstructions were postprocessed. Axial IV contrast-enhanced CT of the abdomen and pelvis with multiplanar reconstruction. Findings: CTA chest: There is no pathologic axillary adenopathy or other worrisome body wall soft tissue finding in the chest. Trace pleural effusion is present on the right. There is no pericardial effusion. Atherosclerotic, nonaneurysmal thoracic aorta. The pulmonary arteries are patent and well opacified, without evidence of filling defect concerning for acute pulmonary embolus. There is no distinct pathologic mediastinal adenopathy. Evaluation of the osseous structures demonstrates no evidence of acute fracture or aggressive osseous lesion. Evaluation of the lung fields demonstrates fairly extensive confluent consolidation in the right lower lobe, with some evidence of endobronchial obstruction, with underlying mass not entirely excluded. The remaining  lung fields demonstrate moderate emphysema and mild diffuse peribronchial groundglass opacity, suggesting component of bronchiolitis.     Impression: Impression: Prominent consolidation is noted in the right lower lobe, somewhat masslike with evidence of endobronchial obstruction, concerning for underlying possible primary or metastatic neoplasm. There is otherwise no suspicious thoracic adenopathy. Diffuse areas of centrilobular groundglass opacity are present, suggesting component of bronchiolitis. No additional acute findings are present. There is no acute pulmonary embolus. Electronically Signed: Eder Mireles  7/18/2023 2:06 PM EDT  Workstation ID: [id number]    MRI Lumbar Spine With & Without Contrast    Result Date: 7/18/2023  MRI LUMBAR SPINE W WO CONTRAST Date of Exam: 7/18/2023 2:27 PM EDT Indication: Low back pain.  Bilateral lower extremity weakness and decreased sensation.  Reported lesion in the lumbar spine from outside hospital..  Comparison:  CT abdomen pelvis from June 7, 2023 Technique:  Routine multiplanar/multisequence sequence images of the lumbar spine were obtained before and after the uneventful administration of 14 mL Multihance.  Findings: The alignment is anatomic. The vertebral body heights appear normal. There are multiple T1 hypointense enhancing lesions most prominent within the L3 vertebral body most compatible with metastasis. There is an associated mild compression deformity at L3,  likely a pathological fracture. No epidural spread of tumor is identified. There are degenerative endplate changes at L3-4 and L4-5. There is disc desiccation at all levels, with moderate degenerative loss of disc height at L4-5. The conus terminates at  the T12-L1 level. The posterior paravertebral soft tissues are unremarkable. L1-2: Mild disc bulge. Mild bilateral facet arthropathy. No spinal canal stenosis. No neural foraminal stenosis. L2-3: Mild disc bulge eccentric to the left. Mild right and moderate facet arthropathy with ligamentum flavum infolding. Mild spinal canal stenosis. Mild right and mild to moderate left neural foraminal stenosis. L3-4: Mild disc bulge. Moderate bilateral facet arthropathy with ligamentum flavum infolding. Mild to moderate spinal canal stenosis. Mild to moderate right and moderate left neural foramen stenosis. L4-5: Moderate disc bulge. Moderate bilateral facet arthropathy with ligamentum flavum infolding. Mild spinal canal stenosis. Moderate right and mild to moderate left neural foraminal stenosis. L5-S1: Mild disc bulge eccentric to the right narrowing the right lateral recess. Moderate right and mild left facet arthropathy. No spinal canal stenosis. Moderate right and mild left neural foraminal stenosis.     Impression: Impression: 1.Multiple enhancing lesions within lumbar spine most prominent at L3, most compatible with osseous metastasis. There is an associated mild compression deformity at L3, likely a pathological  fracture. 2.Mild to moderate multilevel degenerative changes of lumbar spine as described above. Electronically Signed: Ruddy Michael  7/18/2023 3:32 PM EDT  Workstation ID: [id number]         Current medications:  Scheduled Meds:cefTRIAXone, 1,000 mg, Intravenous, Q24H  dilTIAZem CD, 120 mg, Oral, Daily  doxycycline, 100 mg, Oral, Q12H  famotidine, 20 mg, Oral, BID AC  folic acid, 1 mg, Oral, Daily  lactobacillus acidophilus, 1 capsule, Oral, Daily  losartan, 25 mg, Oral, Daily  metoprolol tartrate, 25 mg, Oral, Q12H  potassium chloride, 10 mEq, Intravenous, Q1H  pravastatin, 40 mg, Oral, Nightly  senna-docusate sodium, 2 tablet, Oral, BID  sodium chloride, 10 mL, Intravenous, Q12H  warfarin (COUMADIN) (dosing per levels), , Does not apply, Daily      Continuous Infusions:Pharmacy to dose warfarin,       PRN Meds:.  acetaminophen    aluminum-magnesium hydroxide-simethicone    senna-docusate sodium **AND** polyethylene glycol **AND** bisacodyl **AND** bisacodyl    Calcium Replacement - Follow Nurse / BPA Driven Protocol    Magnesium Standard Dose Replacement - Follow Nurse / BPA Driven Protocol    melatonin    Morphine **AND** naloxone    ondansetron    oxyCODONE-acetaminophen    Pharmacy to dose warfarin    Phosphorus Replacement - Follow Nurse / BPA Driven Protocol    Potassium Replacement - Follow Nurse / BPA Driven Protocol    sodium chloride    sodium chloride    sodium chloride    Assessment & Plan   Assessment & Plan     Active Hospital Problems    Diagnosis  POA    **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]  Yes    Right lower lobe lung mass [R91.8]  Unknown    Metastasis [C79.9]  Unknown    Coronary artery disease with history of myocardial infarction without history of CABG [I25.10, I25.2]  Not Applicable    COPD (chronic obstructive pulmonary disease) [J44.9]  Unknown    Essential hypertension [I10]  Unknown    Hyperlipidemia [E78.5]  Unknown    Functional assessment declined [Z53.20]  Not Applicable    PAF  (paroxysmal atrial fibrillation) [I48.0]  Unknown    Pneumonia of right lung due to infectious organism [J18.9]  Unknown    Supratherapeutic INR [R79.1]  Unknown    Lactic acidosis [E87.20]  Unknown    Leukocytosis [D72.829]  Unknown    CHF (congestive heart failure) [I50.9]  Unknown      Resolved Hospital Problems   No resolved problems to display.        Brief Hospital Course to date:  Phuc Bowden is a 82 y.o. male with pmh significant for CAD s/p MI and CABG, PAF on warfarin, skin cancer, HTN, HLD, hearing loss, previous tobacco use long term with COPD, GERD, presents with worsening low back pain x 2 months.  Patient had a recent fall and evaluation at that time showed concerns for bony mets.  Imaging also with liver mets as well as a right lung mass.  Patient was to have a biopsy at  however this was not done due to warfarin.  He has had progressive pain and weakness prompting evaluation.    Intractable Back Pain with functional decline/ FTT  Right lung mass with liver/ spinal mets  -- recent discovery of lung mass in June upon OSH ED with pending work up through [facility], however pain and debility increasing  -Oncology consulted.  Reviewed treatment be palliative in nature.  Family has opted to not undergo biopsy of right lung mass.  -Radiation oncology consult  -Palliative care consult  -- start oxycodone and morphine prn pain (failed tramadol and norco 10/325 outpatient).  -- PT/OT/ nutrition assessment     Right sided pneumonia, post obstructive  Leukocytosis, lactic acidosis  -- zosyn and vanc started in ED.  -Blood cultures pending.  Strep and Legionella negative.  MRSA negative.  Respiratory panel negative.  -Procalcitonin elevated with leukocytosis.  -Changed from Zosyn to Rocephin Doxy.     PAF  Supratherapeutic INR  -- hold warfarin, pharmacy consulted  -- vitamin K for INR 5.9; no evidence of bleeding  -- continue metoprolol and cardizem     HTN  HLD  CAD s/p CABG  CHF  -- continue losartan,  metoprolol, pravastatin    Expected Discharge Location and Transportation: Likely home  Expected Discharge   Expected Discharge Date: 7/25/2023; Expected Discharge Time:      DVT prophylaxis:  Medical DVT prophylaxis orders are present.          CODE STATUS:   Code Status and Medical Interventions:   Ordered at: 07/18/23 1604     Medical Intervention Limits:    NO intubation (DNI)    NO cardioversion     Code Status (Patient has no pulse and is not breathing):    No CPR (Do Not Attempt to Resuscitate)     Medical Interventions (Patient has pulse or is breathing):    Limited Support       Maria E Silva DO  07/19/23        Electronically signed by Maria E Silva DO at 07/19/23 [id number]

## 2023-07-23 LAB
BACTERIA SPEC AEROBE CULT: NORMAL
BACTERIA SPEC AEROBE CULT: NORMAL

## 2023-07-24 LAB
QT INTERVAL: 306 MS
QTC INTERVAL: 468 MS

## 2023-07-26 PROCEDURE — 77336 RADIATION PHYSICS CONSULT: CPT | Performed by: RADIOLOGY
